# Patient Record
Sex: FEMALE | Race: ASIAN | Employment: OTHER | URBAN - METROPOLITAN AREA
[De-identification: names, ages, dates, MRNs, and addresses within clinical notes are randomized per-mention and may not be internally consistent; named-entity substitution may affect disease eponyms.]

---

## 2018-06-17 ENCOUNTER — APPOINTMENT (OUTPATIENT)
Dept: GENERAL RADIOLOGY | Age: 83
End: 2018-06-17
Attending: EMERGENCY MEDICINE
Payer: MEDICARE

## 2018-06-17 ENCOUNTER — HOSPITAL ENCOUNTER (EMERGENCY)
Age: 83
Discharge: HOME | End: 2018-06-17
Attending: EMERGENCY MEDICINE
Payer: MEDICARE

## 2018-06-17 VITALS
SYSTOLIC BLOOD PRESSURE: 182 MMHG | RESPIRATION RATE: 20 BRPM | HEART RATE: 74 BPM | TEMPERATURE: 97.4 F | DIASTOLIC BLOOD PRESSURE: 65 MMHG | BODY MASS INDEX: 23.99 KG/M2 | OXYGEN SATURATION: 98 % | HEIGHT: 59 IN | WEIGHT: 119 LBS

## 2018-06-17 DIAGNOSIS — S40.021A CONTUSION OF RIGHT ARM, INITIAL ENCOUNTER: Primary | ICD-10-CM

## 2018-06-17 PROCEDURE — A4565 SLINGS: HCPCS

## 2018-06-17 PROCEDURE — 71045 X-RAY EXAM CHEST 1 VIEW: CPT

## 2018-06-17 PROCEDURE — 73060 X-RAY EXAM OF HUMERUS: CPT

## 2018-06-17 PROCEDURE — 74011250637 HC RX REV CODE- 250/637: Performed by: EMERGENCY MEDICINE

## 2018-06-17 PROCEDURE — 99284 EMERGENCY DEPT VISIT MOD MDM: CPT

## 2018-06-17 RX ORDER — METHOCARBAMOL 500 MG/1
500 TABLET, FILM COATED ORAL 3 TIMES DAILY
Qty: 21 TAB | Refills: 0 | Status: SHIPPED | OUTPATIENT
Start: 2018-06-17 | End: 2018-11-20

## 2018-06-17 RX ORDER — OXYCODONE AND ACETAMINOPHEN 5; 325 MG/1; MG/1
1 TABLET ORAL
Status: COMPLETED | OUTPATIENT
Start: 2018-06-17 | End: 2018-06-17

## 2018-06-17 RX ORDER — DICLOFENAC SODIUM 50 MG/1
50 TABLET, DELAYED RELEASE ORAL
Qty: 30 TAB | Refills: 0 | Status: SHIPPED | OUTPATIENT
Start: 2018-06-17 | End: 2018-11-20

## 2018-06-17 RX ORDER — LANOLIN ALCOHOL/MO/W.PET/CERES
CREAM (GRAM) TOPICAL
COMMUNITY

## 2018-06-17 RX ORDER — ACETAMINOPHEN 325 MG/1
TABLET ORAL
COMMUNITY
End: 2021-09-09

## 2018-06-17 RX ORDER — ONDANSETRON 4 MG/1
4 TABLET, ORALLY DISINTEGRATING ORAL
Status: COMPLETED | OUTPATIENT
Start: 2018-06-17 | End: 2018-06-17

## 2018-06-17 RX ADMIN — ONDANSETRON 4 MG: 4 TABLET, ORALLY DISINTEGRATING ORAL at 02:07

## 2018-06-17 RX ADMIN — OXYCODONE HYDROCHLORIDE AND ACETAMINOPHEN 1 TABLET: 5; 325 TABLET ORAL at 00:49

## 2018-06-17 NOTE — DISCHARGE INSTRUCTIONS
Contusion: Care Instructions  Your Care Instructions    Contusion is the medical term for a bruise. It is the result of a direct blow or an impact, such as a fall. Contusions are common sports injuries. Most people think of a bruise as a black-and-blue spot. This happens when small blood vessels get torn and leak blood under the skin. But bones, muscles, and organs can also get bruised. This may damage deep tissues but not cause a bruise you can see. The doctor will do a physical exam to find the location of your contusion. You may also have tests to make sure you do not have a more serious injury, such as a broken bone or nerve damage. These may include X-rays or other imaging tests like a CT scan or MRI. Deep-tissue contusions may cause pain and swelling. But if there is no serious damage, they will often get better in a few weeks with home treatment. The doctor has checked you carefully, but problems can develop later. If you notice any problems or new symptoms, get medical treatment right away. Follow-up care is a key part of your treatment and safety. Be sure to make and go to all appointments, and call your doctor if you are having problems. It's also a good idea to know your test results and keep a list of the medicines you take. How can you care for yourself at home? · Put ice or a cold pack on the sore area for 10 to 20 minutes at a time to stop swelling. Put a thin cloth between the ice pack and your skin. · Be safe with medicines. Read and follow all instructions on the label. ¨ If the doctor gave you a prescription medicine for pain, take it as prescribed. ¨ If you are not taking a prescription pain medicine, ask your doctor if you can take an over-the-counter medicine. · If you can, prop up the sore area on pillows as much as possible for the next few days. Try to keep the sore area above the level of your heart. When should you call for help?   Call your doctor now or seek immediate medical care if:  ? · Your pain gets worse. ? · You have new or worse swelling. ? · You have tingling, weakness, or numbness in the area near the contusion. ? · The area near the contusion is cold or pale. ? Watch closely for changes in your health, and be sure to contact your doctor if:  ? · You do not get better as expected. Where can you learn more? Go to http://kyle-jarett.info/. Enter L837 in the search box to learn more about \"Contusion: Care Instructions. \"  Current as of: March 20, 2017  Content Version: 11.4  © 9759-3801 [employer]. Care instructions adapted under license by [employer] (which disclaims liability or warranty for this information). If you have questions about a medical condition or this instruction, always ask your healthcare professional. [address] any warranty or liability for your use of this information.

## 2018-06-17 NOTE — ED PROVIDER NOTES
EMERGENCY DEPARTMENT HISTORY AND PHYSICAL EXAM    Date: 6/17/2018  Patient Name: Antionette Heck    History of Presenting Illness     Chief Complaint   Patient presents with   Kaleigh Bob Fall    Arm Pain         History Provided By: Patient and Patient's     Chief Complaint: Injuries s/p mechanical fall  Duration:  PTA  Timing:  Acute  Modifying Factors: No relieving or worsening factors  Associated Symptoms: Right upper arm pain    Additional History (Context):   12:26 AM  Luis Lawrence is a 80 y.o. female  who presents to the emergency department C/O injuries s/p mechanical fall PTA. Associated sxs include right upper arm pain. Pt is allergic to aspirin and penicillin. Pt denies elbow pain, tobacco use, etoh use, and any other sxs or complaints. PCP: Chaim Reynolds MD    Current Outpatient Prescriptions   Medication Sig Dispense Refill    ferrous sulfate (IRON) 325 mg (65 mg iron) tablet Take  by mouth Daily (before breakfast).  acetaminophen (TYLENOL) 325 mg tablet Take  by mouth every four (4) hours as needed for Pain.  albuterol (PROVENTIL HFA, VENTOLIN HFA, PROAIR HFA) 90 mcg/actuation inhaler Take 2 Puffs by inhalation every four (4) hours as needed for Wheezing. 1 Inhaler 1    lidocaine (XYLOCAINE) 5 % ointment Apply  to affected area as needed for Pain.  diclofenac (FLECTOR) 1.3 % pt12 transdermal patch 1 Patch by TransDERmal route two (2) times a day.  camphor-menthol (SARNA ANTI-ITCH) 0.5-0.5 % lotion Apply  to affected area as needed for Itching.  hydrocortisone (ANUSOL-HC) 2.5 % rectal cream Insert  into rectum four (4) times daily.  Hydrochlorothiazide (HYDRODIURIL) 12.5 mg tablet Take 12.5 mg by mouth daily.  sitaGLIPtin (JANUVIA) 25 mg tablet Take 25 mg by mouth daily.  levothyroxine (SYNTHROID) 25 mcg tablet Take  by mouth Daily (before breakfast).  telmisartan (MICARDIS) 40 mg tablet Take 1 Tab by mouth daily.  90 Tab 3    Calcium Carbonate-Vit D3-Min (CALCIUM-VITAMIN D) 600-400 mg-unit Tab Take 1 Tab by mouth two (2) times a day.  aspirin delayed-release 81 mg tablet Take 81 mg by mouth daily.  pyridoxine (VITAMIN B-6) 50 mg tablet Take 50 mg by mouth daily.  pravastatin (PRAVACHOL) 40 mg tablet Take 40 mg by mouth nightly.  esomeprazole (NEXIUM) 40 mg capsule Take 40 mg by mouth two (2) times a day. Past History     Past Medical History:  Past Medical History:   Diagnosis Date    Anemia     Arthritis     osteoarthritis    CAD (coronary artery disease)     Depression     Diabetes ([address]     adult onset    GERD (gastroesophageal reflux disease)     [address]     Hypercholesterolemia     Hyperlipidemia     Hypertension     Menopause     Mitral regurgitation     Myocardial infarct (HCC)     Sick sinus syndrome (HCC)        Past Surgical History:  Past Surgical History:   Procedure Laterality Date    HX HEART CATHETERIZATION      HX OTHER SURGICAL      shoulder surgery    HX OTHER SURGICAL      eye surgery    HX OTHER SURGICAL      sinus surgery    HX OTHER SURGICAL      knee surgery       Family History:  Family History   Problem Relation Age of Onset    Coronary Artery Disease Mother     Anemia Neg Hx     Arrhythmia Neg Hx     Asthma Neg Hx     Clotting Disorder Neg Hx     Diabetes Neg Hx     Fainting Neg Hx     Heart Attack Neg Hx     Heart Surgery Neg Hx     High Cholesterol Neg Hx     Hypertension Neg Hx     Pacemaker Neg Hx     Sudden Death Neg Hx        Social History:  Social History   Substance Use Topics    Smoking status: Never Smoker    Smokeless tobacco: None    Alcohol use No       Allergies:   Allergies   Allergen Reactions    Latex, Natural Rubber Not Reported This Time    Aspirin Nausea Only    Iodinated Contrast- Oral And Iv Dye Other (comments)     Reactions: splotches on skin    Other Medication Itching     All \"mycins\"    Pcn [Penicillins] Other (comments)     Reactions: splotches on skin    Sting, Bee Swelling         Review of Systems   Review of Systems   Constitutional: Negative for fever. Musculoskeletal: Positive for myalgias (right upper arm pain). Negative for arthralgias (elbow pain). All other systems reviewed and are negative. Physical Exam     Vitals:    06/17/18 0018   BP: 182/65   Pulse: 74   Resp: 20   Temp: 97.4 °F (36.3 °C)   SpO2: 98%   Weight: 54 kg (119 lb)   Height: 4' 11\" (1.499 m)     Physical Exam   Constitutional: She is oriented to person, place, and time. She appears well-developed and well-nourished. No distress. HENT:   Head: Normocephalic and atraumatic. Right Ear: External ear normal.   Left Ear: External ear normal.   Mouth/Throat: Oropharynx is clear and moist. No oropharyngeal exudate. Eyes: Conjunctivae and EOM are normal. Pupils are equal, round, and reactive to light. No scleral icterus. No pallor   Neck: Normal range of motion. Neck supple. No JVD present. No tracheal deviation present. No thyromegaly present. Cardiovascular: Normal rate, regular rhythm and normal heart sounds. Pulmonary/Chest: Effort normal and breath sounds normal. No stridor. No respiratory distress. Abdominal: Soft. Bowel sounds are normal. She exhibits no distension. There is no tenderness. There is no rebound and no guarding. Musculoskeletal: Normal range of motion. She exhibits no edema or tenderness. No soft tissue injuries. Proximal humerus tenderness.  strength is normal.    No crepitus. Radial, median, and ulnar nerves are all normal.   Lymphadenopathy:     She has no cervical adenopathy. Neurological: She is alert and oriented to person, place, and time. She has normal reflexes. No cranial nerve deficit. Coordination normal.   Skin: Skin is warm and dry. No rash noted. She is not diaphoretic. No erythema. Psychiatric: She has a normal mood and affect.  Her behavior is normal. Judgment and thought content normal.   Nursing note and vitals reviewed. Diagnostic Study Results     Labs -   No results found for this or any previous visit (from the past 12 hour(s)). Radiologic Studies -     RADIOLOGY FINDINGS  Right humerus X-ray shows no acute process  Pending review by Radiologist  Recorded by REMBERTO Yang, as dictated by Jesus. Jorge Mark MD      RADIOLOGY FINDINGS  Chest X-ray shows no fracture or pulmonary findings. No PTX or pleural effusion. Pending review by Radiologist  Recorded by REMBERTO Yang, as dictated by Jesus. Jorge Mark MD    XR HUMERUS RT    (Results Pending)   XR CHEST SNGL V    (Results Pending)     CT Results  (Last 48 hours)    None        CXR Results  (Last 48 hours)    None          Medications given in the ED-  Medications   oxyCODONE-acetaminophen (PERCOCET) 5-325 mg per tablet 1 Tab (1 Tab Oral Given 6/17/18 0049)   ondansetron (ZOFRAN ODT) tablet 4 mg (4 mg Oral Given 6/17/18 0207)         Medical Decision Making   I am the first provider for this patient. I reviewed the vital signs, available nursing notes, past medical history, past surgical history, family history and social history. Vital Signs-Reviewed the patient's vital signs. Pulse Oximetry Analysis - 98% on room air       Records Reviewed: Nursing Notes and Old Medical Records    Provider Notes (Medical Decision Making):   Fracture of right humerus is probable. Dislocation also possible but unlikely she shows no signs of radial nerve palsy other neurologic or vascular injury. Procedures:  Procedures    ED Course:   12:26 AM Initial assessment performed. The patients presenting problems have been discussed, and they are in agreement with the care plan formulated and outlined with them. I have encouraged them to ask questions as they arise throughout their visit. Diagnosis and Disposition       DISCHARGE NOTE:  2:18 AM  Luis LAKHWINDER Irma's  results have been reviewed with her.   She has been counseled regarding her diagnosis, treatment, and plan. She verbally conveys understanding and agreement of the signs, symptoms, diagnosis, treatment and prognosis and additionally agrees to follow up as discussed. She also agrees with the care-plan and conveys that all of her questions have been answered. I have also provided discharge instructions for her that include: educational information regarding their diagnosis and treatment, and list of reasons why they would want to return to the ED prior to their follow-up appointment, should her condition change. She has been provided with education for proper emergency department utilization. CLINICAL IMPRESSION:    1. Contusion of right arm, initial encounter        PLAN:  1. D/C Home  2. Current Discharge Medication List        3. Follow-up Information     Follow up With Details Comments [id number] [facility] MD Mattie Schedule an appointment as soon as possible for a visit in 2 days For orthopedic follow up [address] [place], [place] [address] [facility]      Favio Cage MD Schedule an appointment as soon as possible for a visit in 2 days For primary care follow up [place] [phone] [facility]      THE [facility] EMERGENCY DEPT Go to As needed, if symptoms worsen [address] Code [id number]  [phone]        _______________________________    Attestations: This note is prepared by Wendy Hurtado, acting as Scribe for Jesus. Annette Merino MD.    Jesus. Annette Merino MD:  The scribe's documentation has been prepared under my direction and personally reviewed by me in its entirety.   I confirm that the note above accurately reflects all work, treatment, procedures, and medical decision making performed by me.  _______________________________

## 2018-06-17 NOTE — ED NOTES
Encouraged pt to lay in bed.  at bedside. Call bell within reach, side rails x 1, stretcher in lowest position.

## 2018-06-17 NOTE — ED NOTES
Discharge instructions reviewed with opportunity for questions provided. Pt vocalized understanding. Armband removed and shredded. Pt stable condition at time of discharge. Ride at bedside.

## 2018-06-17 NOTE — ED NOTES
Pt hourly rounding competed. Safety   Pt () resting on stretcher with side rails up and call bell in reach. (x) in chair    () in parents arms. Toileting   Pt offered ()Bedpan     ()Assistance to Restroom     ()Urinal  Ongoing Updates  Updated on plan of care and status of test results. Pain Management  Inquired as to comfort and offered comfort measures:    (x) warm blankets   () dimmed lights    Pt reporting nausea. Will update provider.

## 2018-08-27 ENCOUNTER — APPOINTMENT (OUTPATIENT)
Dept: PHYSICAL THERAPY | Age: 83
End: 2018-08-27

## 2018-09-13 ENCOUNTER — HOSPITAL ENCOUNTER (OUTPATIENT)
Dept: MAMMOGRAPHY | Age: 83
Discharge: HOME | End: 2018-09-13
Attending: INTERNAL MEDICINE
Payer: MEDICARE

## 2018-09-13 DIAGNOSIS — M85.80 OSTEOPENIA: ICD-10-CM

## 2018-09-13 DIAGNOSIS — Z78.0 POST-MENOPAUSAL: ICD-10-CM

## 2018-09-13 PROCEDURE — 77080 DXA BONE DENSITY AXIAL: CPT

## 2018-11-20 ENCOUNTER — HOSPITAL ENCOUNTER (OUTPATIENT)
Dept: PREADMISSION TESTING | Age: 83
Discharge: HOME | End: 2018-11-20
Payer: MEDICARE

## 2018-11-20 LAB
ALBUMIN SERPL-MCNC: 3.4 G/DL (ref 3.4–5)
ALBUMIN/GLOB SERPL: 1 {RATIO} (ref 0.8–1.7)
ALP SERPL-CCNC: 85 U/L (ref 45–117)
ALT SERPL-CCNC: 22 U/L (ref 13–56)
ANION GAP SERPL CALC-SCNC: 15 MMOL/L (ref 3–18)
APTT PPP: 33.1 SEC (ref 23–36.4)
AST SERPL-CCNC: 18 U/L (ref 15–37)
BACTERIA SPEC CULT: NORMAL
BILIRUB SERPL-MCNC: 0.5 MG/DL (ref 0.2–1)
BUN SERPL-MCNC: 44 MG/DL (ref 7–18)
BUN/CREAT SERPL: 32
CALCIUM SERPL-MCNC: 9.3 MG/DL (ref 8.5–10.1)
CHLORIDE SERPL-SCNC: 104 MMOL/L (ref 100–108)
CO2 SERPL-SCNC: 21 MMOL/L (ref 21–32)
CREAT SERPL-MCNC: 1.36 MG/DL (ref 0.6–1.3)
ERYTHROCYTE [DISTWIDTH] IN BLOOD BY AUTOMATED COUNT: 13.4 % (ref 11.6–14.5)
GLOBULIN SER CALC-MCNC: 3.5 G/DL (ref 2–4)
GLUCOSE SERPL-MCNC: 109 MG/DL (ref 74–99)
HCT VFR BLD AUTO: 38.5 % (ref 35–45)
HGB BLD-MCNC: 12.9 G/DL (ref 12–16)
INR PPP: 1.1 (ref 0.8–1.2)
MCH RBC QN AUTO: 29.8 PG (ref 24–34)
MCHC RBC AUTO-ENTMCNC: 33.5 G/DL (ref 31–37)
MCV RBC AUTO: 88.9 FL (ref 74–97)
PLATELET # BLD AUTO: 267 K/UL (ref 135–420)
PMV BLD AUTO: 9.5 FL (ref 9.2–11.8)
POTASSIUM SERPL-SCNC: 4.6 MMOL/L (ref 3.5–5.5)
PROT SERPL-MCNC: 6.9 G/DL (ref 6.4–8.2)
PROTHROMBIN TIME: 13.4 SEC (ref 11.5–15.2)
RBC # BLD AUTO: 4.33 M/UL (ref 4.2–5.3)
SERVICE CMNT-IMP: NORMAL
SODIUM SERPL-SCNC: 140 MMOL/L (ref 136–145)
WBC # BLD AUTO: 8.4 K/UL (ref 4.6–13.2)

## 2018-11-20 PROCEDURE — 93005 ELECTROCARDIOGRAM TRACING: CPT

## 2018-11-20 PROCEDURE — 85730 THROMBOPLASTIN TIME PARTIAL: CPT

## 2018-11-20 PROCEDURE — 87641 MR-STAPH DNA AMP PROBE: CPT

## 2018-11-20 PROCEDURE — 85027 COMPLETE CBC AUTOMATED: CPT

## 2018-11-20 PROCEDURE — 85610 PROTHROMBIN TIME: CPT

## 2018-11-20 PROCEDURE — 80053 COMPREHEN METABOLIC PANEL: CPT

## 2018-11-20 RX ORDER — LANOLIN ALCOHOL/MO/W.PET/CERES
50 CREAM (GRAM) TOPICAL DAILY
Status: ON HOLD | COMMUNITY
End: 2019-01-11

## 2018-11-20 RX ORDER — TELMISARTAN AND HYDROCHLORTHIAZIDE 40; 12.5 MG/1; MG/1
1 TABLET ORAL DAILY
COMMUNITY

## 2018-11-20 RX ORDER — NITROFURANTOIN MACROCRYSTALS 50 MG/1
50 CAPSULE ORAL EVERY 6 HOURS
COMMUNITY
End: 2021-09-09

## 2018-11-20 RX ORDER — SODIUM CHLORIDE, SODIUM LACTATE, POTASSIUM CHLORIDE, CALCIUM CHLORIDE 600; 310; 30; 20 MG/100ML; MG/100ML; MG/100ML; MG/100ML
125 INJECTION, SOLUTION INTRAVENOUS CONTINUOUS
Status: CANCELLED | OUTPATIENT
Start: 2018-11-20

## 2018-11-20 NOTE — PERIOP NOTES
Patient has red, itchy raised bumps on lower abdomen, have a small yellowish head on them. She states they came after her steroid injection. Checked back and all over trunk, bumps only in lower abdomen but patient states she feels itchy in the sides of her abdomen too. Dr. Zeina Sagastume office notified.

## 2018-11-20 NOTE — PERIOP NOTES
No sleep apnea or previous sleep studies. No history of MH. PCP is aware of surgery. Care fusion instructions reviewed and wipes given. Does  meet criteria for special population at this time. Not participating in clinical trials or research study. Patient and  state Dr Oumou Blanco office has contacted Cardio and PCP to for pre-op clearance, to be faxed to PAT.

## 2018-11-22 LAB
ATRIAL RATE: 82 BPM
CALCULATED R AXIS, ECG10: -145 DEGREES
CALCULATED T AXIS, ECG11: 118 DEGREES
DIAGNOSIS, 93000: NORMAL
P-R INTERVAL, ECG05: 158 MS
Q-T INTERVAL, ECG07: 396 MS
QRS DURATION, ECG06: 80 MS
QTC CALCULATION (BEZET), ECG08: 462 MS
VENTRICULAR RATE, ECG03: 82 BPM

## 2018-12-20 ENCOUNTER — HOSPITAL ENCOUNTER (OUTPATIENT)
Dept: NUCLEAR MEDICINE | Age: 83
Discharge: HOME | End: 2018-12-20
Attending: INTERNAL MEDICINE
Payer: MEDICARE

## 2018-12-20 ENCOUNTER — HOSPITAL ENCOUNTER (OUTPATIENT)
Dept: NON INVASIVE DIAGNOSTICS | Age: 83
Discharge: HOME | End: 2018-12-20
Attending: INTERNAL MEDICINE
Payer: MEDICARE

## 2018-12-20 VITALS
DIASTOLIC BLOOD PRESSURE: 85 MMHG | HEIGHT: 59 IN | SYSTOLIC BLOOD PRESSURE: 160 MMHG | WEIGHT: 119 LBS | BODY MASS INDEX: 23.99 KG/M2

## 2018-12-20 DIAGNOSIS — R06.02 SHORTNESS OF BREATH: ICD-10-CM

## 2018-12-20 PROCEDURE — 93017 CV STRESS TEST TRACING ONLY: CPT

## 2018-12-20 PROCEDURE — A9500 TC99M SESTAMIBI: HCPCS

## 2018-12-20 PROCEDURE — 74011250636 HC RX REV CODE- 250/636

## 2018-12-20 RX ADMIN — REGADENOSON 0.4 MG: 0.08 INJECTION, SOLUTION INTRAVENOUS at 14:57

## 2018-12-21 LAB
STRESS BASELINE DIAS BP: 85 MMHG
STRESS BASELINE HR: 75 BPM
STRESS BASELINE SYS BP: 160 MMHG
STRESS ESTIMATED WORKLOAD: 1 METS
STRESS EXERCISE DUR MIN: NORMAL
STRESS PEAK DIAS BP: 94 MMHG
STRESS PEAK SYS BP: 173 MMHG
STRESS PERCENT HR ACHIEVED: 80 %
STRESS POST PEAK HR: 91 BPM
STRESS RATE PRESSURE PRODUCT: NORMAL BPM*MMHG
STRESS ST DEPRESSION: 0 MM
STRESS ST ELEVATION: 0 MM
STRESS STAGE 1 DURATION: NORMAL MIN:SEC
STRESS STAGE 1 HR: 89 BPM
STRESS STAGE RECOVERY 1 BP: NORMAL MMHG
STRESS STAGE RECOVERY 1 DURATION: NORMAL MIN:SEC
STRESS STAGE RECOVERY 1 HR: 84 BPM
STRESS TARGET HR: 114 BPM

## 2019-01-02 ENCOUNTER — HOSPITAL ENCOUNTER (OUTPATIENT)
Dept: NON INVASIVE DIAGNOSTICS | Age: 84
Discharge: HOME | End: 2019-01-02
Payer: MEDICARE

## 2019-01-02 ENCOUNTER — HOSPITAL ENCOUNTER (OUTPATIENT)
Dept: PREADMISSION TESTING | Age: 84
Discharge: HOME | End: 2019-01-02
Payer: MEDICARE

## 2019-01-02 DIAGNOSIS — M86.061: ICD-10-CM

## 2019-01-02 DIAGNOSIS — M48.07 LUMBOSACRAL STENOSIS: ICD-10-CM

## 2019-01-02 DIAGNOSIS — M47.26 OTHER SPONDYLOSIS WITH RADICULOPATHY, LUMBAR REGION: ICD-10-CM

## 2019-01-02 LAB
ALBUMIN SERPL-MCNC: 3.8 G/DL (ref 3.4–5)
ALBUMIN/GLOB SERPL: 1.1 {RATIO} (ref 0.8–1.7)
ALP SERPL-CCNC: 74 U/L (ref 45–117)
ALT SERPL-CCNC: 20 U/L (ref 13–56)
ANION GAP SERPL CALC-SCNC: 6 MMOL/L (ref 3–18)
APTT PPP: 33.2 SEC (ref 23–36.4)
AST SERPL-CCNC: 20 U/L (ref 15–37)
ATRIAL RATE: 78 BPM
BACTERIA SPEC CULT: NORMAL
BILIRUB SERPL-MCNC: 0.4 MG/DL (ref 0.2–1)
BUN SERPL-MCNC: 40 MG/DL (ref 7–18)
BUN/CREAT SERPL: 28
CALCIUM SERPL-MCNC: 9.2 MG/DL (ref 8.5–10.1)
CALCULATED P AXIS, ECG09: 61 DEGREES
CALCULATED R AXIS, ECG10: -47 DEGREES
CALCULATED T AXIS, ECG11: 61 DEGREES
CHLORIDE SERPL-SCNC: 105 MMOL/L (ref 100–108)
CO2 SERPL-SCNC: 28 MMOL/L (ref 21–32)
CREAT SERPL-MCNC: 1.44 MG/DL (ref 0.6–1.3)
DIAGNOSIS, 93000: NORMAL
ERYTHROCYTE [DISTWIDTH] IN BLOOD BY AUTOMATED COUNT: 14.1 % (ref 11.6–14.5)
GLOBULIN SER CALC-MCNC: 3.5 G/DL (ref 2–4)
GLUCOSE SERPL-MCNC: 89 MG/DL (ref 74–99)
HCT VFR BLD AUTO: 42.2 % (ref 35–45)
HGB BLD-MCNC: 14 G/DL (ref 12–16)
INR PPP: 0.9 (ref 0.8–1.2)
MCH RBC QN AUTO: 29.5 PG (ref 24–34)
MCHC RBC AUTO-ENTMCNC: 33.2 G/DL (ref 31–37)
MCV RBC AUTO: 88.8 FL (ref 74–97)
P-R INTERVAL, ECG05: 154 MS
PLATELET # BLD AUTO: 240 K/UL (ref 135–420)
PMV BLD AUTO: 10.2 FL (ref 9.2–11.8)
POTASSIUM SERPL-SCNC: 4.5 MMOL/L (ref 3.5–5.5)
PROT SERPL-MCNC: 7.3 G/DL (ref 6.4–8.2)
PROTHROMBIN TIME: 12.3 SEC (ref 11.5–15.2)
Q-T INTERVAL, ECG07: 406 MS
QRS DURATION, ECG06: 84 MS
QTC CALCULATION (BEZET), ECG08: 462 MS
RBC # BLD AUTO: 4.75 M/UL (ref 4.2–5.3)
SERVICE CMNT-IMP: NORMAL
SODIUM SERPL-SCNC: 139 MMOL/L (ref 136–145)
VENTRICULAR RATE, ECG03: 78 BPM
WBC # BLD AUTO: 8 K/UL (ref 4.6–13.2)

## 2019-01-02 PROCEDURE — 85730 THROMBOPLASTIN TIME PARTIAL: CPT

## 2019-01-02 PROCEDURE — 85610 PROTHROMBIN TIME: CPT

## 2019-01-02 PROCEDURE — 93005 ELECTROCARDIOGRAM TRACING: CPT

## 2019-01-02 PROCEDURE — 36415 COLL VENOUS BLD VENIPUNCTURE: CPT

## 2019-01-02 PROCEDURE — 87641 MR-STAPH DNA AMP PROBE: CPT

## 2019-01-02 PROCEDURE — 85027 COMPLETE CBC AUTOMATED: CPT

## 2019-01-02 PROCEDURE — 80053 COMPREHEN METABOLIC PANEL: CPT

## 2019-01-10 RX ORDER — ACETAMINOPHEN AND CODEINE PHOSPHATE 300; 30 MG/1; MG/1
1 TABLET ORAL
Status: ON HOLD | COMMUNITY
End: 2019-01-22

## 2019-01-10 RX ORDER — LANOLIN ALCOHOL/MO/W.PET/CERES
1000 CREAM (GRAM) TOPICAL DAILY
COMMUNITY

## 2019-01-10 RX ORDER — MONTELUKAST SODIUM 10 MG/1
10 TABLET ORAL EVERY EVENING
COMMUNITY

## 2019-01-10 RX ORDER — ALBUTEROL SULFATE 90 UG/1
2 AEROSOL, METERED RESPIRATORY (INHALATION)
COMMUNITY

## 2019-01-10 RX ORDER — ONDANSETRON 4 MG/1
4 TABLET, FILM COATED ORAL
COMMUNITY
End: 2021-09-09

## 2019-01-10 RX ORDER — BENZONATATE 100 MG/1
100 CAPSULE ORAL
COMMUNITY

## 2019-01-10 RX ORDER — HYDROCORTISONE 25 MG/G
CREAM TOPICAL 2 TIMES DAILY
COMMUNITY

## 2019-01-10 RX ORDER — CODEINE PHOSPHATE AND GUAIFENESIN 10; 100 MG/5ML; MG/5ML
5 SOLUTION ORAL EVERY 4 HOURS
COMMUNITY
End: 2019-01-24 | Stop reason: ALTCHOICE

## 2019-01-11 ENCOUNTER — HOSPITAL ENCOUNTER (OUTPATIENT)
Age: 84
Setting detail: OUTPATIENT SURGERY
Discharge: HOME | End: 2019-01-11
Attending: INTERNAL MEDICINE | Admitting: INTERNAL MEDICINE
Payer: MEDICARE

## 2019-01-11 VITALS
BODY MASS INDEX: 25.64 KG/M2 | DIASTOLIC BLOOD PRESSURE: 54 MMHG | OXYGEN SATURATION: 96 % | TEMPERATURE: 98 F | SYSTOLIC BLOOD PRESSURE: 152 MMHG | WEIGHT: 114 LBS | HEART RATE: 56 BPM | HEIGHT: 56 IN | RESPIRATION RATE: 20 BRPM

## 2019-01-11 DIAGNOSIS — R06.02 SOB (SHORTNESS OF BREATH): ICD-10-CM

## 2019-01-11 LAB
GLUCOSE BLD STRIP.AUTO-MCNC: 142 MG/DL (ref 70–110)
GLUCOSE BLD STRIP.AUTO-MCNC: <30 MG/DL (ref 70–110)

## 2019-01-11 PROCEDURE — C1769 GUIDE WIRE: HCPCS | Performed by: INTERNAL MEDICINE

## 2019-01-11 PROCEDURE — 77030008543 HC TBNG MON PRSS MRTM -A: Performed by: INTERNAL MEDICINE

## 2019-01-11 PROCEDURE — 74011636320 HC RX REV CODE- 636/320: Performed by: INTERNAL MEDICINE

## 2019-01-11 PROCEDURE — 99152 MOD SED SAME PHYS/QHP 5/>YRS: CPT | Performed by: INTERNAL MEDICINE

## 2019-01-11 PROCEDURE — 74011250636 HC RX REV CODE- 250/636: Performed by: INTERNAL MEDICINE

## 2019-01-11 PROCEDURE — 77030004521 HC CATH ANGI DX COOK -B: Performed by: INTERNAL MEDICINE

## 2019-01-11 PROCEDURE — 93458 L HRT ARTERY/VENTRICLE ANGIO: CPT | Performed by: INTERNAL MEDICINE

## 2019-01-11 PROCEDURE — 77030004522 HC CATH ANGI DX EXPO BSC -A: Performed by: INTERNAL MEDICINE

## 2019-01-11 PROCEDURE — 74011250636 HC RX REV CODE- 250/636

## 2019-01-11 PROCEDURE — 77030013797 HC KT TRNSDUC PRSSR EDWD -A: Performed by: INTERNAL MEDICINE

## 2019-01-11 PROCEDURE — C1894 INTRO/SHEATH, NON-LASER: HCPCS | Performed by: INTERNAL MEDICINE

## 2019-01-11 PROCEDURE — 77030029997 HC DEV COM RDL R BND TELE -B: Performed by: INTERNAL MEDICINE

## 2019-01-11 PROCEDURE — 82962 GLUCOSE BLOOD TEST: CPT

## 2019-01-11 PROCEDURE — 77030016699 HC CATH ANGI DX INFN1 CARD -A: Performed by: INTERNAL MEDICINE

## 2019-01-11 PROCEDURE — 99153 MOD SED SAME PHYS/QHP EA: CPT | Performed by: INTERNAL MEDICINE

## 2019-01-11 PROCEDURE — 74011000250 HC RX REV CODE- 250: Performed by: INTERNAL MEDICINE

## 2019-01-11 RX ORDER — HYDROCORTISONE SODIUM SUCCINATE 100 MG/2ML
INJECTION, POWDER, FOR SOLUTION INTRAMUSCULAR; INTRAVENOUS AS NEEDED
Status: DISCONTINUED | OUTPATIENT
Start: 2019-01-11 | End: 2019-01-11 | Stop reason: HOSPADM

## 2019-01-11 RX ORDER — FENTANYL CITRATE 50 UG/ML
INJECTION, SOLUTION INTRAMUSCULAR; INTRAVENOUS AS NEEDED
Status: DISCONTINUED | OUTPATIENT
Start: 2019-01-11 | End: 2019-01-11 | Stop reason: HOSPADM

## 2019-01-11 RX ORDER — HEPARIN SODIUM 200 [USP'U]/100ML
INJECTION, SOLUTION INTRAVENOUS
Status: COMPLETED | OUTPATIENT
Start: 2019-01-11 | End: 2019-01-11

## 2019-01-11 RX ORDER — HEPARIN SODIUM 1000 [USP'U]/ML
INJECTION, SOLUTION INTRAVENOUS; SUBCUTANEOUS AS NEEDED
Status: DISCONTINUED | OUTPATIENT
Start: 2019-01-11 | End: 2019-01-11 | Stop reason: HOSPADM

## 2019-01-11 RX ORDER — SODIUM CHLORIDE 9 MG/ML
50 INJECTION, SOLUTION INTRAVENOUS CONTINUOUS
Status: DISCONTINUED | OUTPATIENT
Start: 2019-01-11 | End: 2019-01-11 | Stop reason: HOSPADM

## 2019-01-11 RX ORDER — VERAPAMIL HYDROCHLORIDE 2.5 MG/ML
INJECTION, SOLUTION INTRAVENOUS AS NEEDED
Status: DISCONTINUED | OUTPATIENT
Start: 2019-01-11 | End: 2019-01-11 | Stop reason: HOSPADM

## 2019-01-11 RX ORDER — SODIUM CHLORIDE 0.9 % (FLUSH) 0.9 %
5-40 SYRINGE (ML) INJECTION EVERY 8 HOURS
Status: DISCONTINUED | OUTPATIENT
Start: 2019-01-11 | End: 2019-01-11 | Stop reason: HOSPADM

## 2019-01-11 RX ORDER — SODIUM CHLORIDE 0.9 % (FLUSH) 0.9 %
5-40 SYRINGE (ML) INJECTION AS NEEDED
Status: DISCONTINUED | OUTPATIENT
Start: 2019-01-11 | End: 2019-01-11 | Stop reason: HOSPADM

## 2019-01-11 RX ORDER — ONDANSETRON 2 MG/ML
4 INJECTION INTRAMUSCULAR; INTRAVENOUS
Status: DISCONTINUED | OUTPATIENT
Start: 2019-01-11 | End: 2019-01-11 | Stop reason: HOSPADM

## 2019-01-11 RX ORDER — MIDAZOLAM HYDROCHLORIDE 1 MG/ML
INJECTION, SOLUTION INTRAMUSCULAR; INTRAVENOUS AS NEEDED
Status: DISCONTINUED | OUTPATIENT
Start: 2019-01-11 | End: 2019-01-11 | Stop reason: HOSPADM

## 2019-01-11 RX ORDER — ONDANSETRON 2 MG/ML
INJECTION INTRAMUSCULAR; INTRAVENOUS
Status: DISCONTINUED
Start: 2019-01-11 | End: 2019-01-11 | Stop reason: HOSPADM

## 2019-01-11 RX ADMIN — ONDANSETRON 4 MG: 2 INJECTION INTRAMUSCULAR; INTRAVENOUS at 11:14

## 2019-01-11 RX ADMIN — SODIUM CHLORIDE 50 ML/HR: 900 INJECTION, SOLUTION INTRAVENOUS at 09:28

## 2019-01-11 NOTE — Clinical Note
TRANSFER - OUT REPORT:  
 
Verbal report given to: chloe. Report consisted of patient's Situation, Background, Assessment and  
Recommendations(SBAR). Opportunity for questions and clarification was provided. Patient transported with a Registered Nurse and [address] [place] [address] / [facility] [employer].

## 2019-01-11 NOTE — Clinical Note
TRANSFER - IN REPORT:  
 
Verbal report received from: rosendo. Report consisted of patient's Situation, Background, Assessment and  
Recommendations(SBAR). Opportunity for questions and clarification was provided. Assessment completed upon patient's arrival to unit and care assumed. Patient transported with a Registered Nurse and [address] [place] [address] / [facility] [employer].

## 2019-01-11 NOTE — PROGRESS NOTES
Pt is all prepped an ready for procedure. Pt and spouse stated of being allergic to red dye not contrast dye. Asked many times and confirmed of no allergy to contrast dye only red dye.

## 2019-01-11 NOTE — PROCEDURES
LHC and Coronary angiogram done via left radial approach. Coronary anatomy described below with noted coronary artery disease. LV gram was not done. LVEDP 6-10 mm hg. No significant gradient on pull back. Left main has luminal irregularities. Left main bifurcates in LAD and LCx. LAD has luminal irregularities. D1 is large caliber long vessel and has luminal irregularities. LAD/D has corkscrew appearance. LCx is non dominant vessel. LCx has luminal irregularities. OM1 is small caliber vessel with luminal irregularities. LCx/OM has corkscrew appearance. RCA is dominant vessel. RCA has luminal irregularities. RPDA and RPLA are medium to large caliber vessel with luminal irregularities. Patient tolerated procedure well. Scant blood loss. No complications. No specimen removed. Recommendation:    Medication considered:   Aspirin, beta blocker, ACEI, Nitrates and statin     CAD risk factor education  Diet education  Control cholesterol  Blood pressure control  Exercise education: Age and functional status appropriate. Patient is at moderate risk for ernie procedural cardiovascular event for back surgery.

## 2019-01-11 NOTE — PROGRESS NOTES
1115 Pt back to care unit S/P cardiac cath. Pt awake and alert. TR band to lt wrist with immobilizer in place. Site WNL. Lr wrist precautions reinforced. Pt c/o nausea and Zofran adm as per order 
 
1200 Pt reported much relief from nausea. 1230 TR and removed and site WNL. Tegaderm dressing and sterile 2x2 applied. 1245 Pt request to go to bathroom, assisted. A few minutes later pt started bleeding from lt wrist site. Pressure applied and taken back to bed. Bleeding persists, so another TR band reapplied by [employer]. 1312 Started to take air out slowly from  TR band. .. 1330 Second Tr band removed and tolerated well. No bleeding nor hematoma noted. 1400 Discharge instructions reviewed with pt and spouse and they verbalized all understandings. Pt escorted to car and left with spouse in stable condition Lt wrist dry and intact with immobilizer in place.

## 2019-01-11 NOTE — ROUTINE PROCESS
Cardiac Cath Lab:  Pre Procedure Chart Check Patients chart was accessed and reviewed for possible and/or scheduled procedure. Creatinine Clearance: 
Serum creatinine: 1.44 mg/dL (H) 01/02/19 1454 Estimated creatinine clearance: 20.4 mL/min (A) Total Contrast  Load: 
3 x estimated clearance amount=  61.2ml 
 
75% of Contrast Load: 0.75 x Total Contrast Load=    45.9ml No results for input(s): WBC, RBC, HCT, HGB, PLT, INR, APTT, PTP, NA, K, BUN, CREA, GFRAA, GFRNA, CA, MAG, CPK, CKMB, CKNDX, CKND1, TROPT, TROIQ, BNPP, BNP, HCTEXT, HGBEXT, PLTEXT in the last 72 hours. No lab exists for component: DDIMER, GLUCOSE, INREXT 
 
BMI: There is no height or weight on file to calculate BMI. ALLERGIES:  
Allergies Allergen Reactions  Latex, Natural Rubber Not Reported This Time  Aspirin Nausea Only  Iodinated Contrast- Oral And Iv Dye Other (comments) Reactions: splotches on skin  Other Medication Itching All \"mycins\"  Pcn [Penicillins] Other (comments) Reactions: splotches on skin  Sting, Bee Swelling Lines: 
  
  
  
  
 
History: 
 
Past Medical History:  
Diagnosis Date  Adverse effect of anesthesia   
 very, very sleepy with Anesthesia  Adverse effect of anesthesia   
 decreased heartrate with colonoscopy  Anemia  Arthritis   
 osteoarthritis  CAD (coronary artery disease)  Depression  Diabetes ([facility] [address] adult onset  Difficult intubation   
 went bradycardc with symptoms, had diificuly waking up for 24 hours  Foot fracture 2008  
 right  GERD (gastroesophageal reflux disease)  Heart attack ([facility] [address] 2005  Hypercholesterolemia  Hyperlipidemia  Hypertension  Ill-defined condition 10/2018  
 reddened itchy bumps on lower abdomen after steroid injections in October, itchy patient states, very red, slightly opened with yellow looking head on them.  Ill-defined condition history of frequent uti's, especially with stress and pressure on the stomach  Ill-defined condition   
 history of post nasal drip  Ill-defined condition   
 history of frequent small BM's at night  Ill-defined condition   
 recurren bladder infection  Menopause  Mitral regurgitation  Myocardial infarct ([address]  Nausea & vomiting   
 severe nausea  Sick sinus syndrome ([address]  Thyroid disease Past Surgical History:  
Procedure Laterality Date  HX CATARACT REMOVAL Right  HX GI    
 colonosopy  HX GYN    
 bartholin cyst  
 HX HEART CATHETERIZATION  2005  HX HEENT Bilateral   
 macular puckering  HX HEENT    
 sinus surgery  HX KNEE ARTHROSCOPY Right  HX ORTHOPAEDIC    
 cortisone shots to both knees  HX OTHER SURGICAL    
 shoulder surgery  HX OTHER SURGICAL    
 eye surgery  HX OTHER SURGICAL  many years ago  
 sinus surgery  HX OTHER SURGICAL    
 knee surgery  HX OTHER SURGICAL  10-22 & 10-29 Epidural steroid injection  HX SHOULDER ARTHROSCOPY Right Patient Active Problem List  
Diagnosis Code  Hypertension I10  
 Diabetes ([address] E11.9  Fatigue R53.83  
 Hyperlipidemia E78.5  Dizziness R42  
 H/O myocardial infarction, greater than 8 weeks I25.2  Chest pain, unspecified R07.9  Back pain M54.9  Aortic valve disorders I35.9

## 2019-01-11 NOTE — DISCHARGE SUMMARY
Discharge Summary    Patient: Tiana Soares MRN: [id number]  CSN: [id number]    YOB: 1932  Age: 80 y.o. Sex: female    DOA: 1/11/2019 LOS:  LOS: 0 days   Discharge Date:      Primary Care Provider:  Stefano Montaño MD    Admission Diagnoses: SOB (shortness of breath) [R06.02], Pre op, Abnormal stress test    Discharge Diagnoses:  Pre op, Shortness of breath   Problem List as of 1/11/2019 Date Reviewed: 4/15/2014          Codes Class Noted - Resolved    Aortic valve disorders ICD-10-CM: I35.9  ICD-9-CM: 424.1  12/17/2013 - Present        Chest pain, unspecified ICD-10-CM: R07.9  ICD-9-CM: 786.50  10/10/2013 - Present        Back pain ICD-10-CM: M54.9  ICD-9-CM: 724.5  10/10/2013 - Present        Dizziness ICD-10-CM: R42  ICD-9-CM: 780.4  6/13/2013 - Present        H/O myocardial infarction, greater than 8 weeks ICD-10-CM: I25.2  ICD-9-CM: 719  6/13/2013 - Present        Hypertension ICD-10-CM: I10  ICD-9-CM: 401.9  Unknown - Present        Diabetes ([facility] [address] ICD-10-CM: E11.9  ICD-9-CM: 250.00  Unknown - Present    Overview Signed 1/3/2011  7:25 PM by Watson Salcedo     adult onset             Fatigue ICD-10-CM: R53.83  ICD-9-CM: 780.79  Unknown - Present        Hyperlipidemia ICD-10-CM: E78.5  ICD-9-CM: 272.4  Unknown - Present        RESOLVED: Palpitations ICD-10-CM: R00.2  ICD-9-CM: 785.1  Unknown - 6/13/2013              Discharge Medications:     Current Discharge Medication List      CONTINUE these medications which have NOT CHANGED    Details   cyanocobalamin (VITAMIN B-12) 1,000 mcg tablet Take 1,000 mcg by mouth daily. montelukast (SINGULAIR) 10 mg tablet Take 10 mg by mouth every evening. gentamicin-prednisoLONE (PRED-G) 0.3-1 % ophthalmic drops Administer 1 Drop to both eyes four (4) times daily. metoprolol succinate (TOPROL-XL) 25 mg XL tablet Take 25 mg by mouth nightly.       multivitamin, tx-iron-ca-min (THERA-M W/ IRON) 9 mg iron-400 mcg tab tablet Take 1 Tab by mouth daily.      Omega-3 Fatty Acids (FISH OIL) 500 mg cap Take  by mouth daily. telmisartan-hydroCHLOROthiazide (MICARDIS HCT) 40-12.5 mg per tablet Take 1 Tab by mouth daily. nitrofurantoin (MACRODANTIN) 50 mg capsule Take 50 mg by mouth every six (6) hours. ferrous sulfate (IRON) 325 mg (65 mg iron) tablet Take  by mouth Daily (before breakfast). acetaminophen (TYLENOL) 325 mg tablet Take  by mouth every four (4) hours as needed for Pain.      sitaGLIPtin (JANUVIA) 25 mg tablet Take 25 mg by mouth daily. levothyroxine (SYNTHROID) 25 mcg tablet Take  by mouth nightly. Calcium Carbonate-Vit D3-Min (CALCIUM-VITAMIN D) 600-400 mg-unit Tab Take 1 Tab by mouth two (2) times a day. aspirin delayed-release 81 mg tablet Take 81 mg by mouth daily. pravastatin (PRAVACHOL) 40 mg tablet Take 40 mg by mouth nightly. esomeprazole (NEXIUM) 40 mg capsule Take 40 mg by mouth daily. acetaminophen-codeine (TYLENOL-CODEINE #3) 300-30 mg per tablet Take 1 Tab by mouth every four (4) hours as needed for Pain. benzonatate (TESSALON) 100 mg capsule Take 100 mg by mouth three (3) times daily as needed for Cough. guaiFENesin-codeine (GUAIFENESIN AC) 100-10 mg/5 mL solution Take 5 mL by mouth every four (4) hours. hydrocortisone (ANUSOL-HC) 2.5 % rectal cream Insert  into rectum two (2) times a day. ondansetron hcl (ZOFRAN) 4 mg tablet Take 4 mg by mouth every eight (8) hours as needed for Nausea. albuterol (PROVENTIL HFA) 90 mcg/actuation inhaler Take 2 Puffs by inhalation every four (4) hours as needed for Wheezing. psyllium (METAMUCIL) packet Take 1 Packet by mouth nightly.              Discharge Condition: Stable    Procedures : LHC and coronary angiogram     Consults: None       PHYSICAL EXAM   Visit Vitals  BP (!) 160/91 (BP 1 Location: Right arm, BP Patient Position: At rest)   Pulse (!) 53   Temp 98.6 °F (37 °C)   Resp 16   Ht 4' 8\" (1.422 m)   Wt 51.7 kg (114 lb)   SpO2 97%   Breastfeeding? No   BMI 25.56 kg/m²     General: Awake, cooperative, no acute distress    HEENT: NC, Atraumatic. PERRLA, EOMI. Anicteric sclerae. Lungs:  CTA Bilaterally. No Wheezing/Rhonchi/Rales. Heart:  Regular  rhythm,  No murmur, No Rubs, No Gallops  Abdomen: Soft, Non distended, Non tender. +Bowel sounds,   Extremities: No c/c/e  Psych:   Not anxious or agitated. Neurologic:  No acute neurological deficits. Admission HPI : See H/P    Hospital Course : Patient came for out patient LHC and coronary angiogram. LHC and Coronary angiogram done via left radial approach. Coronary anatomy described below with noted coronary artery disease. LV gram was not done. LVEDP 6-10 mm hg. No significant gradient on pull back. Left main has luminal irregularities. Left main bifurcates in LAD and LCx. LAD has luminal irregularities. D1 is large caliber long vessel and has luminal irregularities. LAD/D has corkscrew appearance. LCx is non dominant vessel. LCx has luminal irregularities. OM1 is small caliber vessel with luminal irregularities. LCx/OM has corkscrew appearance. RCA is dominant vessel. RCA has luminal irregularities. RPDA and RPLA are medium to large caliber vessel with luminal irregularities. Patient tolerated procedure well. Scant blood loss. No complications. No specimen removed. Recommendation:    Medication considered:   Aspirin, beta blocker, ACEI, Nitrates and statin     CAD risk factor education  Diet education  Control cholesterol  Blood pressure control  Exercise education: Age and functional status appropriate. Patient is at moderate risk for ernie procedural cardiovascular event for back surgery. Activity: No driving for 24 hours, no weight lifting not more than 10 lbs from left hand for 1 week    Diet: Cardiac     Follow-up:  In cardiology clinic after 2 weeks    Disposition: Home    Minutes spent on discharge: 30 minutes       Labs: Results:       Chemistry No results for input(s): GLU, NA, K, CL, CO2, BUN, CREA, CA, AGAP, BUCR, TBIL, GPT, AP, TP, ALB, GLOB, AGRAT in the last 72 hours. CBC w/Diff No results for input(s): WBC, RBC, HGB, HCT, PLT, GRANS, LYMPH, EOS, HGBEXT, HCTEXT, PLTEXT in the last 72 hours. Cardiac Enzymes No results for input(s): CPK, CKND1, BRAYAN in the last 72 hours. No lab exists for component: CKRMB, TROIP   Coagulation No results for input(s): PTP, INR, APTT in the last 72 hours. No lab exists for component: INREXT    Lipid Panel No results found for: CHOL, CHOLPOCT, CHOLX, CHLST, CHOLV, 024144, HDL, LDL, LDLC, DLDLP, 063148, VLDLC, VLDL, TGLX, TRIGL, TRIGP, TGLPOCT, CHHD, CHHDX   BNP No results for input(s): BNPP in the last 72 hours. Liver Enzymes No results for input(s): TP, ALB, TBIL, AP, SGOT, GPT in the last 72 hours. No lab exists for component: DBIL   Thyroid Studies Lab Results   Component Value Date/Time    TSH 1.75 12/20/2015 04:00 PM            Significant Diagnostic Studies: No results found. No results found for this or any previous visit.         CC: Irene Dominguez MD

## 2019-01-11 NOTE — H&P
Date of Surgery Update: 
Luis Solis was seen and examined. History and physical has been reviewed. The patient has been examined. There have been no significant clinical changes since the completion of the originally dated History and Physical. 
 
Patient assessed and is candidate for moderate sedation.    
 
Patient has red dye allergy, she is not allergic to Iodine or IV contrast. 
 
Signed By: Mary Garay MD   
 January 11, 2019 10:19 AM

## 2019-01-11 NOTE — Clinical Note
Contrast Dose Calculator:  
Patient's age: 80.  
Patient's sex: Female. Patient weight (kg) = 51.7. Creatinine level (mg/dL) = 1.44. Creatinine clearance (mL/min): 22.89. Max Contrast dose per Creatinine Cl calculator = 51.5 mL.

## 2019-01-11 NOTE — Clinical Note
Multiple views of the left main, left coronary artery, LAD and circumflex artery obtained using hand injection.

## 2019-01-15 PROBLEM — M48.061 LUMBAR SPINAL STENOSIS: Status: ACTIVE | Noted: 2019-01-15

## 2019-01-15 PROBLEM — M54.10 RADICULOPATHY OF LEG: Status: ACTIVE | Noted: 2019-01-15

## 2019-01-15 PROBLEM — M47.816 LUMBAR SPONDYLOSIS: Status: ACTIVE | Noted: 2019-01-15

## 2019-01-15 NOTE — H&P
Patient Name:   Jose L Sow  YOB: 1932      Chief Complaint:  Low back and hip pain. History of Chief Complaint:  She presents today for followup on her ongoing back pain. She states she is still having significant discomfort across the lower back. She states she was trying to take tramadol for her discomfort; however, it seemed to cause fairly severe constipation, so she has had to go back on the Tylenol which does not seem to control the pain. She states that at this point she cannot even walk due to the discomfort. She rates it is a 9/10. It is a sharp, aching pain. She would like to see about moving forward with surgical intervention due to her significant amount of pain and limited mobility at this time. She has failed conservative care including physical therapy, epidural steroid injections, anti-inflammatories, and analgesics. Past Medical/Surgical History:    Disease/Disorder Type Date Side Surgery Date Side Comment       Eye surgery  bilateral        Lower leg surgery  right        Nasal surgery          Vein surgery - leg  right        Cataract extraction  right    Asthma          Bartholin's gland cyst          Bladder infection          Blind eye   left       Chronic UTI          Diabetes mellitus          GERD          Hyperlipidemia          Hypertension          Kidney infections          Macular degeneration          Macular puckering   left       Myocardial infarction          Osteoarthritis          Osteoporosis          Panniculitis          Peripheral vascular disease          Thyroid disease              Arthroscopy knee 07/14/2004 left        Arthroscopy shoulder 08/05/2002 right    Bronchitis  02/2016          Allergies:    Ingredient Reaction Medication Name Comment   ERYTHROMYCIN BASE Rash     AZITHROMYCIN Rash     RED DYE Dizziness;  Difficulty speaking     PENICILLINS Rash     VENOM-HONEY BEE      LATEX Rash     ASPIRIN      ANESTHETICS - LISA TYPE- PARABENS Stops heart  Almost stops heart when under anesthesia   ANESTHETICS - AMIDE TYPE Stops heart  Almost stops heart when under anesthesia     Current Medications:    Medication Directions   Fish Oil 360 mg-1,200 mg capsule take 1 capsule by oral route  every evening   Centrum Silver Women 8 mg iron-400 mcg-300 mcg tablet    pantoprazole 40 mg tablet,delayed release    fluconazole 150 mg tablet    Vitamin B-12 1,000 mcg tablet    ipratropium bromide 0.03 % nasal spray    calcium carbonate 600 mg (1,500 mg)-vitamin D3 400 unit tablet    telmisartan 40 mg-hydrochlorothiazide 12.5 mg tablet take 1.5 tablet daily. aspirin 81 mg tablet,delayed release    Vitamin B-6 50 mg tablet    Januvia 25 mg tablet    Anucort-HC 25 mg suppository    nitrofurantoin macrocrystal 50 mg capsule    Synthroid 25 mcg tablet    pravastatin 40 mg tablet    FeroSul 325 mg (65 mg iron) tablet    LiquiTears 1.4 % eye drops    lidocaine 5 % topical patch    Ultram 50 mg tablet take 1-2 tabs every 6 hrs as needed for pain   Neurontin 100 mg capsule take 1 capsule by oral route 3 times every day with food     Social History:    SMOKING  Status Tobacco Type Units Per Day Yrs Used   Former smoker        ALCOHOL  There is no history of alcohol use. Family History:    Disease Detail Family Member Age Cause of Death Comments   Heart disease Mother  N      Review of Systems:   Pertinent positives include tendonitis. Pertinent negatives include weight change and fainting. Vitals:  Date BP Pulse Temp (F) Resp. (per min.) Height (Total in.) Weight (lbs.) BMI   10/29/2018 114/82 96 98.3  56.00  25.56   10/11/2018 135/82 97   56.00  25.56   07/17/2018     56.00  25.33     Physical Examination:    The thoracolumbar spine has normal kyphosis and lordosis, a normal appearance, and no scoliosis. No crepitation, deformity, or instability was noted. There is full range of motion of the cervical, thoracic, and lumbar spine and of the hips, knees, and ankles. Ambulation was without antalgia. Straight leg, cross leg, and femoral stretch testing were negative. There was no weakness in the paraspinal muscles or in all myotomes (L2-S1) of the lower extremities. Deep tendon reflexes are present in the Achilles and patellae and were normal,  2/4, bilaterally. Clonus was negative, and Babinski was downgoing. Pulses were 2+ in the lower extremities bilaterally, and no edema was noted. Leg lengths were equal.  The patient could stand and heel walk and toe walk without evidence of neurological deficits. The patient is oriented with no evident mood abnormality. She is in a wheelchair today. She is unable to ambulate due to the discomfort. She is tender across the lumbar paraspinals. There is pain with all ranges of motion. Assessment:   1. Lumbar degenerative disc disease, multilevel. 2.  Multilevel lumbar facet joint arthropathy. 3.  Multilevel lumbar spinal stenosis. 4.  Right lower extremity radiculopathy. 5.  Failure of conservative care including  physical therapy, anti-inflammatories, analgesics, and epidurals. Recommendation:  Unfortunately, at this point, the patient has severe pain. She is unable to ambulate due to her discomfort. We discussed the only option for her to try to get her pain level under control and try to get her ambulatory again would be to move forward with surgical intervention. We discussed, due to her age and comorbidities, we want to be very minimalistic with the decompression. We discussed we would move forward with a right L4 to S1 hemilaminectomy. She would lie to proceed. The risks versus the benefits as well as the alternatives were fully explained to the patient.   Risks include, but are not limited to, paralysis, death, heart attack, stroke, pulmonary embolism, deep vein thrombosis, infection, failure to relieve pain, increase in back or leg pain, reherniation of disc material, need for revision surgery, scarring, spinal fluid leak, bowel or bladder dysfunction, disease transmission, chronic graft site pain, instrumentation failure, pseudarthrosis, and the need for chronic ambulatory assist devices. The patient states full understanding of the risks and benefits and wishes to proceed. At this point, we will have her follow up with us postoperatively and call with any and all concerns.

## 2019-01-21 ENCOUNTER — ANESTHESIA EVENT (OUTPATIENT)
Dept: SURGERY | Age: 84
End: 2019-01-21
Payer: MEDICARE

## 2019-01-22 ENCOUNTER — ANESTHESIA (OUTPATIENT)
Dept: SURGERY | Age: 84
End: 2019-01-22
Payer: MEDICARE

## 2019-01-22 ENCOUNTER — HOSPITAL ENCOUNTER (OUTPATIENT)
Age: 84
Setting detail: OBSERVATION
Discharge: HOME | End: 2019-01-23
Attending: ORTHOPAEDIC SURGERY | Admitting: ORTHOPAEDIC SURGERY
Payer: MEDICARE

## 2019-01-22 ENCOUNTER — APPOINTMENT (OUTPATIENT)
Dept: GENERAL RADIOLOGY | Age: 84
End: 2019-01-22
Attending: ORTHOPAEDIC SURGERY
Payer: MEDICARE

## 2019-01-22 DIAGNOSIS — M47.816 LUMBAR SPONDYLOSIS: Primary | ICD-10-CM

## 2019-01-22 LAB
APPEARANCE UR: CLEAR
BILIRUB UR QL: NEGATIVE
COLOR UR: YELLOW
GLUCOSE BLD STRIP.AUTO-MCNC: 106 MG/DL (ref 70–110)
GLUCOSE BLD STRIP.AUTO-MCNC: 126 MG/DL (ref 70–110)
GLUCOSE BLD STRIP.AUTO-MCNC: 135 MG/DL (ref 70–110)
GLUCOSE BLD STRIP.AUTO-MCNC: 150 MG/DL (ref 70–110)
GLUCOSE UR STRIP.AUTO-MCNC: NEGATIVE MG/DL
HBA1C MFR BLD: 6.2 % (ref 4.2–5.6)
HGB UR QL STRIP: NEGATIVE
KETONES UR QL STRIP.AUTO: NEGATIVE MG/DL
LEUKOCYTE ESTERASE UR QL STRIP.AUTO: NEGATIVE
NITRITE UR QL STRIP.AUTO: NEGATIVE
PH UR STRIP: 7 [PH] (ref 5–8)
PROT UR STRIP-MCNC: NEGATIVE MG/DL
SP GR UR REFRACTOMETRY: 1.01 (ref 1–1.03)
UROBILINOGEN UR QL STRIP.AUTO: 0.2 EU/DL (ref 0.2–1)

## 2019-01-22 PROCEDURE — 74011000250 HC RX REV CODE- 250

## 2019-01-22 PROCEDURE — 76010000149 HC OR TIME 1 TO 1.5 HR: Performed by: ORTHOPAEDIC SURGERY

## 2019-01-22 PROCEDURE — 74011250636 HC RX REV CODE- 250/636

## 2019-01-22 PROCEDURE — 77030018836 HC SOL IRR NACL ICUM -A: Performed by: ORTHOPAEDIC SURGERY

## 2019-01-22 PROCEDURE — 77030006643: Performed by: ANESTHESIOLOGY

## 2019-01-22 PROCEDURE — 36415 COLL VENOUS BLD VENIPUNCTURE: CPT

## 2019-01-22 PROCEDURE — 74011250636 HC RX REV CODE- 250/636: Performed by: ORTHOPAEDIC SURGERY

## 2019-01-22 PROCEDURE — 77030037875 HC DRSG MEPILEX <16IN BORD MOLN -A: Performed by: ORTHOPAEDIC SURGERY

## 2019-01-22 PROCEDURE — 77030003029 HC SUT VCRL J&J -B: Performed by: ORTHOPAEDIC SURGERY

## 2019-01-22 PROCEDURE — 77030020010 HC FCPS BPLR DISP INLC -E: Performed by: ORTHOPAEDIC SURGERY

## 2019-01-22 PROCEDURE — 74011250636 HC RX REV CODE- 250/636: Performed by: ANESTHESIOLOGY

## 2019-01-22 PROCEDURE — 76060000033 HC ANESTHESIA 1 TO 1.5 HR: Performed by: ORTHOPAEDIC SURGERY

## 2019-01-22 PROCEDURE — 77030032490 HC SLV COMPR SCD KNE COVD -B: Performed by: ORTHOPAEDIC SURGERY

## 2019-01-22 PROCEDURE — 81003 URINALYSIS AUTO W/O SCOPE: CPT

## 2019-01-22 PROCEDURE — 77030004435 HC BUR RND STRY -C: Performed by: ORTHOPAEDIC SURGERY

## 2019-01-22 PROCEDURE — 74011250637 HC RX REV CODE- 250/637: Performed by: PHYSICIAN ASSISTANT

## 2019-01-22 PROCEDURE — 77030039266 HC ADH SKN EXOFIN S2SG -A: Performed by: ORTHOPAEDIC SURGERY

## 2019-01-22 PROCEDURE — 77030008683 HC TU ET CUF COVD -A: Performed by: ANESTHESIOLOGY

## 2019-01-22 PROCEDURE — 77030013708 HC HNDPC SUC IRR PULS STRY –B: Performed by: ORTHOPAEDIC SURGERY

## 2019-01-22 PROCEDURE — 77010033678 HC OXYGEN DAILY

## 2019-01-22 PROCEDURE — 74011000258 HC RX REV CODE- 258: Performed by: ANESTHESIOLOGY

## 2019-01-22 PROCEDURE — 74011250636 HC RX REV CODE- 250/636: Performed by: PHYSICIAN ASSISTANT

## 2019-01-22 PROCEDURE — 77030033138 HC SUT PGA STRATFX J&J -B: Performed by: ORTHOPAEDIC SURGERY

## 2019-01-22 PROCEDURE — 76210000000 HC OR PH I REC 2 TO 2.5 HR: Performed by: ORTHOPAEDIC SURGERY

## 2019-01-22 PROCEDURE — 77030020782 HC GWN BAIR PAWS FLX 3M -B: Performed by: ORTHOPAEDIC SURGERY

## 2019-01-22 PROCEDURE — 77030018390 HC SPNG HEMSTAT2 J&J -B: Performed by: ORTHOPAEDIC SURGERY

## 2019-01-22 PROCEDURE — 97163 PT EVAL HIGH COMPLEX 45 MIN: CPT

## 2019-01-22 PROCEDURE — 77030013079 HC BLNKT BAIR HGGR 3M -A: Performed by: ANESTHESIOLOGY

## 2019-01-22 PROCEDURE — 74011000250 HC RX REV CODE- 250: Performed by: ORTHOPAEDIC SURGERY

## 2019-01-22 PROCEDURE — 83036 HEMOGLOBIN GLYCOSYLATED A1C: CPT

## 2019-01-22 PROCEDURE — 99218 HC RM OBSERVATION: CPT

## 2019-01-22 PROCEDURE — 77030012406 HC DRN WND PENRS BARD -A: Performed by: ORTHOPAEDIC SURGERY

## 2019-01-22 PROCEDURE — 74011250637 HC RX REV CODE- 250/637: Performed by: ANESTHESIOLOGY

## 2019-01-22 PROCEDURE — 77030008477 HC STYL SATN SLP COVD -A: Performed by: ANESTHESIOLOGY

## 2019-01-22 PROCEDURE — 77030003666 HC NDL SPINAL BD -A: Performed by: ORTHOPAEDIC SURGERY

## 2019-01-22 PROCEDURE — 77030020255 HC SOL INJ LR 1000ML BG: Performed by: ORTHOPAEDIC SURGERY

## 2019-01-22 PROCEDURE — 82962 GLUCOSE BLOOD TEST: CPT

## 2019-01-22 RX ORDER — CELECOXIB 100 MG/1
200 CAPSULE ORAL ONCE
Status: COMPLETED | OUTPATIENT
Start: 2019-01-22 | End: 2019-01-22

## 2019-01-22 RX ORDER — LIDOCAINE HYDROCHLORIDE 20 MG/ML
INJECTION, SOLUTION EPIDURAL; INFILTRATION; INTRACAUDAL; PERINEURAL AS NEEDED
Status: DISCONTINUED | OUTPATIENT
Start: 2019-01-22 | End: 2019-01-22 | Stop reason: HOSPADM

## 2019-01-22 RX ORDER — NALOXONE HYDROCHLORIDE 0.4 MG/ML
0.4 INJECTION, SOLUTION INTRAMUSCULAR; INTRAVENOUS; SUBCUTANEOUS AS NEEDED
Status: DISCONTINUED | OUTPATIENT
Start: 2019-01-22 | End: 2019-01-23 | Stop reason: HOSPADM

## 2019-01-22 RX ORDER — FLUMAZENIL 0.1 MG/ML
0.2 INJECTION INTRAVENOUS
Status: DISCONTINUED | OUTPATIENT
Start: 2019-01-22 | End: 2019-01-22 | Stop reason: HOSPADM

## 2019-01-22 RX ORDER — MONTELUKAST SODIUM 10 MG/1
10 TABLET ORAL EVERY EVENING
Status: DISCONTINUED | OUTPATIENT
Start: 2019-01-22 | End: 2019-01-23 | Stop reason: HOSPADM

## 2019-01-22 RX ORDER — SODIUM CHLORIDE 0.9 % (FLUSH) 0.9 %
5-40 SYRINGE (ML) INJECTION EVERY 8 HOURS
Status: DISCONTINUED | OUTPATIENT
Start: 2019-01-22 | End: 2019-01-22 | Stop reason: HOSPADM

## 2019-01-22 RX ORDER — TELMISARTAN 40 MG/1
40 TABLET ORAL DAILY
Status: DISCONTINUED | OUTPATIENT
Start: 2019-01-23 | End: 2019-01-23 | Stop reason: HOSPADM

## 2019-01-22 RX ORDER — DEXTROSE 50 % IN WATER (D50W) INTRAVENOUS SYRINGE
25-50 AS NEEDED
Status: DISCONTINUED | OUTPATIENT
Start: 2019-01-22 | End: 2019-01-22 | Stop reason: HOSPADM

## 2019-01-22 RX ORDER — MAGNESIUM SULFATE 100 %
4 CRYSTALS MISCELLANEOUS AS NEEDED
Status: DISCONTINUED | OUTPATIENT
Start: 2019-01-22 | End: 2019-01-23 | Stop reason: HOSPADM

## 2019-01-22 RX ORDER — HYDRALAZINE HYDROCHLORIDE 20 MG/ML
5 INJECTION INTRAMUSCULAR; INTRAVENOUS ONCE
Status: COMPLETED | OUTPATIENT
Start: 2019-01-22 | End: 2019-01-22

## 2019-01-22 RX ORDER — NITROFURANTOIN MACROCRYSTALS 50 MG/1
50 CAPSULE ORAL EVERY 6 HOURS
Status: DISCONTINUED | OUTPATIENT
Start: 2019-01-22 | End: 2019-01-23 | Stop reason: HOSPADM

## 2019-01-22 RX ORDER — PRAVASTATIN SODIUM 20 MG/1
40 TABLET ORAL
Status: DISCONTINUED | OUTPATIENT
Start: 2019-01-22 | End: 2019-01-23 | Stop reason: HOSPADM

## 2019-01-22 RX ORDER — FENTANYL CITRATE 50 UG/ML
50 INJECTION, SOLUTION INTRAMUSCULAR; INTRAVENOUS AS NEEDED
Status: DISCONTINUED | OUTPATIENT
Start: 2019-01-22 | End: 2019-01-22 | Stop reason: HOSPADM

## 2019-01-22 RX ORDER — ALBUTEROL SULFATE 90 UG/1
2 AEROSOL, METERED RESPIRATORY (INHALATION)
Status: DISCONTINUED | OUTPATIENT
Start: 2019-01-22 | End: 2019-01-23 | Stop reason: HOSPADM

## 2019-01-22 RX ORDER — SODIUM CHLORIDE 0.9 % (FLUSH) 0.9 %
5-40 SYRINGE (ML) INJECTION AS NEEDED
Status: DISCONTINUED | OUTPATIENT
Start: 2019-01-22 | End: 2019-01-23 | Stop reason: HOSPADM

## 2019-01-22 RX ORDER — PROPOFOL 10 MG/ML
INJECTION, EMULSION INTRAVENOUS AS NEEDED
Status: DISCONTINUED | OUTPATIENT
Start: 2019-01-22 | End: 2019-01-22 | Stop reason: HOSPADM

## 2019-01-22 RX ORDER — ONDANSETRON 2 MG/ML
4 INJECTION INTRAMUSCULAR; INTRAVENOUS
Status: DISCONTINUED | OUTPATIENT
Start: 2019-01-22 | End: 2019-01-23 | Stop reason: HOSPADM

## 2019-01-22 RX ORDER — SUCCINYLCHOLINE CHLORIDE 20 MG/ML
INJECTION INTRAMUSCULAR; INTRAVENOUS AS NEEDED
Status: DISCONTINUED | OUTPATIENT
Start: 2019-01-22 | End: 2019-01-22 | Stop reason: HOSPADM

## 2019-01-22 RX ORDER — LANOLIN ALCOHOL/MO/W.PET/CERES
1 CREAM (GRAM) TOPICAL
Status: DISCONTINUED | OUTPATIENT
Start: 2019-01-23 | End: 2019-01-23 | Stop reason: HOSPADM

## 2019-01-22 RX ORDER — CYCLOBENZAPRINE HCL 10 MG
10 TABLET ORAL
Status: DISCONTINUED | OUTPATIENT
Start: 2019-01-22 | End: 2019-01-23 | Stop reason: HOSPADM

## 2019-01-22 RX ORDER — SODIUM CHLORIDE, SODIUM LACTATE, POTASSIUM CHLORIDE, CALCIUM CHLORIDE 600; 310; 30; 20 MG/100ML; MG/100ML; MG/100ML; MG/100ML
70 INJECTION, SOLUTION INTRAVENOUS CONTINUOUS
Status: DISCONTINUED | OUTPATIENT
Start: 2019-01-22 | End: 2019-01-23

## 2019-01-22 RX ORDER — MAGNESIUM SULFATE 100 %
4 CRYSTALS MISCELLANEOUS AS NEEDED
Status: DISCONTINUED | OUTPATIENT
Start: 2019-01-22 | End: 2019-01-22 | Stop reason: HOSPADM

## 2019-01-22 RX ORDER — MIDAZOLAM HYDROCHLORIDE 1 MG/ML
INJECTION, SOLUTION INTRAMUSCULAR; INTRAVENOUS AS NEEDED
Status: DISCONTINUED | OUTPATIENT
Start: 2019-01-22 | End: 2019-01-22 | Stop reason: HOSPADM

## 2019-01-22 RX ORDER — DEXTROSE 50 % IN WATER (D50W) INTRAVENOUS SYRINGE
25-50 AS NEEDED
Status: DISCONTINUED | OUTPATIENT
Start: 2019-01-22 | End: 2019-01-23 | Stop reason: HOSPADM

## 2019-01-22 RX ORDER — METOPROLOL SUCCINATE 25 MG/1
25 TABLET, EXTENDED RELEASE ORAL
Status: DISCONTINUED | OUTPATIENT
Start: 2019-01-22 | End: 2019-01-23 | Stop reason: HOSPADM

## 2019-01-22 RX ORDER — BISACODYL 5 MG
5 TABLET, DELAYED RELEASE (ENTERIC COATED) ORAL DAILY PRN
Status: DISCONTINUED | OUTPATIENT
Start: 2019-01-22 | End: 2019-01-23 | Stop reason: HOSPADM

## 2019-01-22 RX ORDER — EPHEDRINE SULFATE/0.9% NACL/PF 25 MG/5 ML
SYRINGE (ML) INTRAVENOUS AS NEEDED
Status: DISCONTINUED | OUTPATIENT
Start: 2019-01-22 | End: 2019-01-22 | Stop reason: HOSPADM

## 2019-01-22 RX ORDER — FAMOTIDINE 20 MG/1
20 TABLET, FILM COATED ORAL EVERY 12 HOURS
Status: DISCONTINUED | OUTPATIENT
Start: 2019-01-22 | End: 2019-01-23 | Stop reason: HOSPADM

## 2019-01-22 RX ORDER — BENZONATATE 100 MG/1
100 CAPSULE ORAL
Status: DISCONTINUED | OUTPATIENT
Start: 2019-01-22 | End: 2019-01-23 | Stop reason: HOSPADM

## 2019-01-22 RX ORDER — HYDROCHLOROTHIAZIDE 12.5 MG/1
12.5 CAPSULE ORAL DAILY
Status: DISCONTINUED | OUTPATIENT
Start: 2019-01-23 | End: 2019-01-23 | Stop reason: HOSPADM

## 2019-01-22 RX ORDER — SODIUM CHLORIDE 0.9 % (FLUSH) 0.9 %
5-40 SYRINGE (ML) INJECTION AS NEEDED
Status: DISCONTINUED | OUTPATIENT
Start: 2019-01-22 | End: 2019-01-22 | Stop reason: HOSPADM

## 2019-01-22 RX ORDER — GLYCOPYRROLATE 0.2 MG/ML
INJECTION INTRAMUSCULAR; INTRAVENOUS AS NEEDED
Status: DISCONTINUED | OUTPATIENT
Start: 2019-01-22 | End: 2019-01-22 | Stop reason: HOSPADM

## 2019-01-22 RX ORDER — OXYCODONE AND ACETAMINOPHEN 5; 325 MG/1; MG/1
1-2 TABLET ORAL
Status: DISCONTINUED | OUTPATIENT
Start: 2019-01-22 | End: 2019-01-23 | Stop reason: HOSPADM

## 2019-01-22 RX ORDER — DIPHENHYDRAMINE HYDROCHLORIDE 50 MG/ML
12.5 INJECTION, SOLUTION INTRAMUSCULAR; INTRAVENOUS
Status: DISCONTINUED | OUTPATIENT
Start: 2019-01-22 | End: 2019-01-23 | Stop reason: HOSPADM

## 2019-01-22 RX ORDER — ESMOLOL HYDROCHLORIDE 10 MG/ML
INJECTION INTRAVENOUS AS NEEDED
Status: DISCONTINUED | OUTPATIENT
Start: 2019-01-22 | End: 2019-01-22 | Stop reason: HOSPADM

## 2019-01-22 RX ORDER — PANTOPRAZOLE SODIUM 40 MG/1
40 TABLET, DELAYED RELEASE ORAL DAILY
Status: DISCONTINUED | OUTPATIENT
Start: 2019-01-23 | End: 2019-01-23 | Stop reason: HOSPADM

## 2019-01-22 RX ORDER — SODIUM CHLORIDE 0.9 % (FLUSH) 0.9 %
5-40 SYRINGE (ML) INJECTION EVERY 8 HOURS
Status: DISCONTINUED | OUTPATIENT
Start: 2019-01-22 | End: 2019-01-23 | Stop reason: HOSPADM

## 2019-01-22 RX ORDER — ROCURONIUM BROMIDE 10 MG/ML
INJECTION, SOLUTION INTRAVENOUS AS NEEDED
Status: DISCONTINUED | OUTPATIENT
Start: 2019-01-22 | End: 2019-01-22 | Stop reason: HOSPADM

## 2019-01-22 RX ORDER — FENTANYL CITRATE 50 UG/ML
INJECTION, SOLUTION INTRAMUSCULAR; INTRAVENOUS AS NEEDED
Status: DISCONTINUED | OUTPATIENT
Start: 2019-01-22 | End: 2019-01-22 | Stop reason: HOSPADM

## 2019-01-22 RX ORDER — ONDANSETRON 2 MG/ML
INJECTION INTRAMUSCULAR; INTRAVENOUS AS NEEDED
Status: DISCONTINUED | OUTPATIENT
Start: 2019-01-22 | End: 2019-01-22 | Stop reason: HOSPADM

## 2019-01-22 RX ORDER — LEVOTHYROXINE SODIUM 25 UG/1
25 TABLET ORAL
Status: DISCONTINUED | OUTPATIENT
Start: 2019-01-22 | End: 2019-01-23 | Stop reason: HOSPADM

## 2019-01-22 RX ORDER — INSULIN LISPRO 100 [IU]/ML
INJECTION, SOLUTION INTRAVENOUS; SUBCUTANEOUS
Status: DISCONTINUED | OUTPATIENT
Start: 2019-01-22 | End: 2019-01-23 | Stop reason: HOSPADM

## 2019-01-22 RX ORDER — NALOXONE HYDROCHLORIDE 0.4 MG/ML
0.1 INJECTION, SOLUTION INTRAMUSCULAR; INTRAVENOUS; SUBCUTANEOUS AS NEEDED
Status: DISCONTINUED | OUTPATIENT
Start: 2019-01-22 | End: 2019-01-22 | Stop reason: HOSPADM

## 2019-01-22 RX ORDER — HYDROMORPHONE HYDROCHLORIDE 2 MG/ML
0.5 INJECTION, SOLUTION INTRAMUSCULAR; INTRAVENOUS; SUBCUTANEOUS
Status: DISCONTINUED | OUTPATIENT
Start: 2019-01-22 | End: 2019-01-22 | Stop reason: HOSPADM

## 2019-01-22 RX ORDER — ACETAMINOPHEN 500 MG
1000 TABLET ORAL ONCE
Status: COMPLETED | OUTPATIENT
Start: 2019-01-22 | End: 2019-01-22

## 2019-01-22 RX ORDER — NEOSTIGMINE METHYLSULFATE 5 MG/5 ML
SYRINGE (ML) INTRAVENOUS AS NEEDED
Status: DISCONTINUED | OUTPATIENT
Start: 2019-01-22 | End: 2019-01-22 | Stop reason: HOSPADM

## 2019-01-22 RX ORDER — SODIUM CHLORIDE, SODIUM LACTATE, POTASSIUM CHLORIDE, CALCIUM CHLORIDE 600; 310; 30; 20 MG/100ML; MG/100ML; MG/100ML; MG/100ML
125 INJECTION, SOLUTION INTRAVENOUS CONTINUOUS
Status: DISCONTINUED | OUTPATIENT
Start: 2019-01-22 | End: 2019-01-23

## 2019-01-22 RX ADMIN — FAMOTIDINE 20 MG: 20 TABLET ORAL at 21:50

## 2019-01-22 RX ADMIN — CELECOXIB 200 MG: 100 CAPSULE ORAL at 08:07

## 2019-01-22 RX ADMIN — GLYCOPYRROLATE 0.4 MG: 0.2 INJECTION INTRAMUSCULAR; INTRAVENOUS at 10:34

## 2019-01-22 RX ADMIN — METOPROLOL SUCCINATE 25 MG: 25 TABLET, EXTENDED RELEASE ORAL at 21:52

## 2019-01-22 RX ADMIN — ONDANSETRON 4 MG: 2 INJECTION INTRAMUSCULAR; INTRAVENOUS at 18:11

## 2019-01-22 RX ADMIN — SUCCINYLCHOLINE CHLORIDE 120 MG: 20 INJECTION INTRAMUSCULAR; INTRAVENOUS at 09:29

## 2019-01-22 RX ADMIN — FENTANYL CITRATE 25 MCG: 50 INJECTION, SOLUTION INTRAMUSCULAR; INTRAVENOUS at 09:28

## 2019-01-22 RX ADMIN — VANCOMYCIN HYDROCHLORIDE 1000 MG: 10 INJECTION, POWDER, LYOPHILIZED, FOR SOLUTION INTRAVENOUS at 21:48

## 2019-01-22 RX ADMIN — LEVOTHYROXINE SODIUM 25 MCG: 25 TABLET ORAL at 21:50

## 2019-01-22 RX ADMIN — ACETAMINOPHEN 1000 MG: 500 TABLET, FILM COATED ORAL at 08:07

## 2019-01-22 RX ADMIN — ROCURONIUM BROMIDE 10 MG: 10 INJECTION, SOLUTION INTRAVENOUS at 09:28

## 2019-01-22 RX ADMIN — HYDRALAZINE HYDROCHLORIDE 5 MG: 20 INJECTION INTRAMUSCULAR; INTRAVENOUS at 12:24

## 2019-01-22 RX ADMIN — ONDANSETRON 4 MG: 2 INJECTION INTRAMUSCULAR; INTRAVENOUS at 10:30

## 2019-01-22 RX ADMIN — FENTANYL CITRATE 75 MCG: 50 INJECTION, SOLUTION INTRAMUSCULAR; INTRAVENOUS at 10:32

## 2019-01-22 RX ADMIN — PROPOFOL 90 MG: 10 INJECTION, EMULSION INTRAVENOUS at 09:28

## 2019-01-22 RX ADMIN — SODIUM CHLORIDE, SODIUM LACTATE, POTASSIUM CHLORIDE, AND CALCIUM CHLORIDE 125 ML/HR: 600; 310; 30; 20 INJECTION, SOLUTION INTRAVENOUS at 07:26

## 2019-01-22 RX ADMIN — LIDOCAINE HYDROCHLORIDE 60 MG: 20 INJECTION, SOLUTION EPIDURAL; INFILTRATION; INTRACAUDAL; PERINEURAL at 09:28

## 2019-01-22 RX ADMIN — SODIUM CHLORIDE, SODIUM LACTATE, POTASSIUM CHLORIDE, AND CALCIUM CHLORIDE 70 ML/HR: 600; 310; 30; 20 INJECTION, SOLUTION INTRAVENOUS at 18:11

## 2019-01-22 RX ADMIN — SODIUM CHLORIDE, SODIUM LACTATE, POTASSIUM CHLORIDE, AND CALCIUM CHLORIDE: 600; 310; 30; 20 INJECTION, SOLUTION INTRAVENOUS at 10:15

## 2019-01-22 RX ADMIN — SODIUM CHLORIDE, SODIUM LACTATE, POTASSIUM CHLORIDE, AND CALCIUM CHLORIDE 70 ML/HR: 600; 310; 30; 20 INJECTION, SOLUTION INTRAVENOUS at 20:49

## 2019-01-22 RX ADMIN — ROCURONIUM BROMIDE 10 MG: 10 INJECTION, SOLUTION INTRAVENOUS at 09:31

## 2019-01-22 RX ADMIN — PROMETHAZINE HYDROCHLORIDE 6.25 MG: 25 INJECTION, SOLUTION INTRAMUSCULAR; INTRAVENOUS at 12:30

## 2019-01-22 RX ADMIN — VANCOMYCIN HYDROCHLORIDE 750 MG: 750 INJECTION, POWDER, LYOPHILIZED, FOR SOLUTION INTRAVENOUS at 08:40

## 2019-01-22 RX ADMIN — Medication 2 MG: at 10:34

## 2019-01-22 RX ADMIN — Medication 10 MG: at 09:59

## 2019-01-22 RX ADMIN — HYDROMORPHONE HYDROCHLORIDE 0.5 MG: 2 INJECTION INTRAMUSCULAR; INTRAVENOUS; SUBCUTANEOUS at 11:28

## 2019-01-22 RX ADMIN — HYDROMORPHONE HYDROCHLORIDE 0.5 MG: 2 INJECTION INTRAMUSCULAR; INTRAVENOUS; SUBCUTANEOUS at 12:08

## 2019-01-22 RX ADMIN — NITROFURANTOIN MACROCRYSTALS 50 MG: 50 CAPSULE ORAL at 21:49

## 2019-01-22 RX ADMIN — VANCOMYCIN HYDROCHLORIDE 0.78 G: 750 INJECTION, POWDER, LYOPHILIZED, FOR SOLUTION INTRAVENOUS at 09:20

## 2019-01-22 RX ADMIN — ESMOLOL HYDROCHLORIDE 20 MG: 10 INJECTION INTRAVENOUS at 10:45

## 2019-01-22 RX ADMIN — PRAVASTATIN SODIUM 40 MG: 20 TABLET ORAL at 21:52

## 2019-01-22 RX ADMIN — MIDAZOLAM HYDROCHLORIDE 2 MG: 1 INJECTION, SOLUTION INTRAMUSCULAR; INTRAVENOUS at 09:19

## 2019-01-22 NOTE — PERIOP NOTES
TRANSFER - IN REPORT:    Verbal report received from K. Macel Gitelman RN and DANIEL Murphy CRNA(name) on Luis Solis  being received from OR(unit) for routine post - op      Report consisted of patients Situation, Background, Assessment and   Recommendations(SBAR). Information from the following report(s) SBAR, OR Summary, Procedure Summary and Intake/Output was reviewed with the receiving nurse. Opportunity for questions and clarification was provided. Assessment completed upon patients arrival to unit and care assumed.

## 2019-01-22 NOTE — PERIOP NOTES
Spoke with Samantha Seals and notified him patient c/o nausea, green emesis bag provided. Ordered phenergan IVPB 6.25 mg x 1 dose now and repeat if needed. Also notified him patient is intermittently hypertensive, both pre-induction and pre-op BP were elevated as well. He ordered hydralazine IV 5 mg x 1 dose now. Read back and verified. Will continue to monitor.

## 2019-01-22 NOTE — ANESTHESIA POSTPROCEDURE EVALUATION
Post-Anesthesia Evaluation and Assessment Cardiovascular Function/Vital Signs Visit Vitals /44 Pulse (!) 59 Temp 36.6 °C (97.8 °F) Resp 11 Ht 4' 8\" (1.422 m) Wt 50 kg (110 lb 4 oz) SpO2 100% BMI 24.72 kg/m² Patient is status post Procedure(s): RIGHT LUMBAR 4- SACRAL 1 HEMILAMINECTOMY AND DISCTECTOMY WITH C-ARM, \"SPEC POP\". Nausea/Vomiting: Controlled. Postoperative hydration reviewed and adequate. Pain: 
Pain Scale 1: Numeric (0 - 10) (01/22/19 1120) Pain Intensity 1: 8 (01/22/19 1120) Managed. Neurological Status:  
Neuro (WDL): Exceptions to WDL (01/22/19 1205) At baseline. Mental Status and Level of Consciousness: Arousable. Pulmonary Status:  
O2 Device: Nasal cannula (01/22/19 1113) Adequate oxygenation and airway patent. Complications related to anesthesia: None Post-anesthesia assessment completed. No concerns. Patient has met all discharge requirements. Signed By: Roro Campbell MD  
 January 22, 2019 Procedure(s): RIGHT LUMBAR 4- SACRAL 1 HEMILAMINECTOMY AND DISCTECTOMY WITH C-ARM, \"SPEC POP\". <BSHSIANPOST> Visit Vitals /44 Pulse (!) 59 Temp 36.6 °C (97.8 °F) Resp 11 Ht 4' 8\" (1.422 m) Wt 50 kg (110 lb 4 oz) SpO2 100% BMI 24.72 kg/m²

## 2019-01-22 NOTE — PERIOP NOTES
TRANSFER - OUT REPORT:    Verbal report given to Souyma(name) on Gaurangi LAKHWINDER Solis  being transferred to (unit) for routine post - op       Report consisted of patients Situation, Background, Assessment and   Recommendations(SBAR). Information from the following report(s) SBAR, Kardex, OR Summary, Procedure Summary, Intake/Output and MAR was reviewed with the receiving nurse. Lines:   Peripheral IV 01/22/19 Right Wrist (Active)   Site Assessment Clean, dry, & intact 1/22/2019 12:05 PM   Phlebitis Assessment 0 1/22/2019 12:05 PM   Infiltration Assessment 0 1/22/2019 12:05 PM   Dressing Status Clean, dry, & intact 1/22/2019 12:05 PM   Dressing Type Transparent;Tape 1/22/2019 12:05 PM   Hub Color/Line Status Pink; Infusing;Patent 1/22/2019 12:05 PM   Alcohol Cap Used No 1/22/2019  7:38 AM        Opportunity for questions and clarification was provided.       Patient transported with:   Registered Nurse  Tech

## 2019-01-22 NOTE — OP NOTES
OPERATIVE NOTE    Patient: Alanna Cash MRN: [id number]  SSN: [id number]    YOB: 1932  Age: 80 y.o. Sex: female      Indications: This is a 80y.o. year-old female who presents with back and severe right leg pain. She was positive for stenosis/scoliosis/osteoporosis. The patient was admitted for surgery as conservative measures have failed. Date of Procedure: 1/22/2019     Preoperative Diagnosis: LUMBAR SPONDYLOSIS WITH RADICULOPATHY AND LUMBAR STENOSIS AND LUMBOSACRAL STENOSIS    Postoperative Diagnosis: LUMBAR SPONDYLOSIS WITH RADICULOPATHY AND LUMBAR STENOSIS AND LUMBOSACRAL STENOSIS      Procedure: L4,L5,S1 right hemilaminectomy with unilateral noninstrumented grafting and fusion L5-S1 with fluoro. Surgeon: Moe Alva DO,Surgical Assistant: Nidia Lacey PA-C    Assistant: Samaria Sick: Henrique Franco RN  Physician Assistant: PRICILLA Lassiter  Scrub Tech-1: Vee Ladd  Surg Asst-1: Kalee Hand    Anesthesia: Ana Champion    Estimated Blood Loss: 50cc    Specimens: * No specimens in log *     Drains: Hemovac    Implants: * No implants in log *    Complications: None; patient tolerated the procedure well. Procedure: The patent was greeted by anesthesia and taken to the operative suite where the patient underwent general endotracheal anesthesia. The patient was positioned in the prone position on a standard radiolucent Ronal spine table. A weiss catheter was placed. The lumbar spine was sterilely prepped and draped in the standard fashion. Flouroscopy confirmed the appropriate segments and an incision was made over the posterior spinous processes. The paraspinal musculature was elevated with electrocautery. A high speed bur was utilized to outline the laminectomy site and a complete decompressive lumbar laminectomy was subsequently performed secondary to spinal stenosis. Complete or partial laminectomy was performed at  L-4-S1 on the right. .  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 Aggressive medial facetectomies were performed at all levels with partial or complete facetectomies as needed to decompress the exiting spinal nerve roots. A Foraminotomy was performed over each exiting nerve root to assure neurologic decompression. The pars were left intact at L4  to prevent post-laminectomy instability. The transverse processes at L5 and sacral ala on the right were decorticated and grafted with local bone 5cc for non instrumented fusion for this partially unstable L5-S1 segment. The patient has significant osteoporosis and pedicle screws are unlikely to secure into her vertebra without risk for significant complication. At the completion of the decompression there was no evidence of residual neurological encroachment at any level. The tissues were irrigated with 1000 ml of sterile saline with bacitracin utilizing pulsatile lavage. All bleeding was cauterized with bipolar electrocautery or gel-foam hemostatic agents. A deep hemovac drain was placed. The fascia was re-approximated with 1-0 Vicryl, the skin edges were re-approximated with 2-0 Vicryl and the skin was closed with a running 3-0 Monocryl. A layer of Dermabond Prineo skin sealant was placed as well as a Mepalex dressing. The patient recovered from anesthesia and was transferred to the post-anesthesia care unit in stable condition.   Demond Thompson MD  1/22/2019  2:40 PM

## 2019-01-22 NOTE — PERIOP NOTES
Found to be incontinent of urine prior to departure PACU, all bed linens changed and skin care provided. Dual skin check completed with Bao Heck.    Visit Vitals  /50   Pulse 60   Temp 97.7 °F (36.5 °C)   Resp 16   Ht 4' 8\" (1.422 m)   Wt 50 kg (110 lb 4 oz)   SpO2 100%   BMI 24.72 kg/m²    at bedside in PACU and directed to [address] [place] [address] waiting room until patient arrives in 216 and is assessed by receiving nurse

## 2019-01-22 NOTE — PROGRESS NOTES
Problem: Mobility Impaired (Adult and Pediatric)  Goal: *Acute Goals and Plan of Care (Insert Text)  In 1-7 days pt will be able to perform:  STG  1. Bed mobility:  Demonstrate proper log-roll technique for safe initiation of rolling for OOB activities. 2.  Supine to sit to supine S with HR for meals. 3.  Sit to stand to sit S with RW/LSO in prep for ambulation. LT.  Gait:  Ambulate 50ft S with RW/LSO, for home/community mobility. 2.  Stair Negotiation:  Ascend/descend >3 steps CGA with HR for home entry. 3.  Activity tolerance: Tolerate up in chair 30 minutes-1 hour for ADLs. 4.  Patient/Family Education:  Patient/family to be independent with HEP for follow-up care and safe discharge. physical Therapy EVALUATION    Patient: Luis Toscano (80 y.o. female)  Date: 2019  Primary Diagnosis: LUMBAR SPONDYLOSIS WITH RADICULOPATHY AND LUMBAR STENOSIS AND LUMBOSACRAL STENOSIS  Lumbar spinal stenosis  Procedure(s) (LRB):  RIGHT LUMBAR 4- SACRAL 1 HEMILAMINECTOMY AND DISCTECTOMY WITH C-ARM, \"SPEC POP\" (Right) Day of Surgery   Precautions:   Fall, Back, Spinal    ASSESSMENT :  Based on the objective data described below, the patient presents with decreased functional mobility and independence in regard to bed mobility, transfers, gt quality and tolerance, AROM, generalized strength, pain, balance, activity tolerance, stair negotiation and safety due to recent back surgery. Pt rating pain on numerical pain scale 0/10. Pt very drowsy and required multiple vc/tc for adhering to tasks/activities. Pt kept drifting back to sleep requiring frequent instructions. Pt required min A/mod A for log rolling, supine<>sidelying<>sit. Pt required vc for safe techniques. Pt required constant A for maintaining sitting and too drowsy to participate in sit>stand. Pt returned to supine in bed w/ all needs within reach, SCDs applied.   Per , pt will remain drowsy and nauseated for 6 hours after surgery as he states this is \"her norm after surgery. \"   reports pt was able to ambulate short distances in home prior to surgery today using std walker intermittently. Nurse Tay Goff aware. Recommend [facility] d/c. Patient will benefit from skilled intervention to address the above impairments. Patients rehabilitation potential is considered to be Fair  Factors which may influence rehabilitation potential include:   []         None noted  []         Mental ability/status  [x]         Medical condition  []         Home/family situation and support systems  [x]         Safety awareness  [x]         Pain tolerance/management  []         Other:      PLAN :  Recommendations and Planned Interventions:  [x]           Bed Mobility Training             []    Neuromuscular Re-Education  [x]           Transfer Training                   []    Orthotic/Prosthetic Training  [x]           Gait Training                          []    Modalities  []           Therapeutic Exercises          []    Edema Management/Control  [x]           Therapeutic Activities            [x]    Patient and Family Training/Education  []           Other (comment):    Frequency/Duration: Patient will be followed by physical therapy twice daily to address goals.   Discharge Recommendations: Home Health  Further Equipment Recommendations for Discharge: N/A     SUBJECTIVE:   Patient stated Oh I am.    OBJECTIVE DATA SUMMARY:     Past Medical History:   Diagnosis Date    Adverse effect of anesthesia     very, very sleepy with Anesthesia    Adverse effect of anesthesia     decreased heartrate with colonoscopy    Anemia     Arthritis     osteoarthritis    CAD (coronary artery disease)     Depression     Diabetes ([facility] [address]     adult onset    Difficult intubation     went bradycardc with symptoms, had diificuly waking up for 24 hours    Foot fracture 2008    right    GERD (gastroesophageal reflux disease)     Heart attack ([address] 2005    Hypercholesterolemia     Hyperlipidemia     Hypertension     Ill-defined condition 10/2018    reddened itchy bumps on lower abdomen after steroid injections in October, itchy patient states, very red, slightly opened with yellow looking head on them.     Ill-defined condition     history of frequent uti's, especially with stress and pressure on the stomach    Ill-defined condition     history of post nasal drip    Ill-defined condition     history of frequent small BM's at night    Ill-defined condition     recurren bladder infection    Menopause     Mitral regurgitation     Myocardial infarct (HCC)     Nausea & vomiting     severe nausea    Sick sinus syndrome (HCC)     Thyroid disease      Past Surgical History:   Procedure Laterality Date    HX CATARACT REMOVAL Right     HX GI      colonosopy    HX GYN      bartholin cyst    HX HEART CATHETERIZATION  2005    HX HEENT Bilateral     macular puckering    HX HEENT      sinus surgery    HX KNEE ARTHROSCOPY Right     HX ORTHOPAEDIC      cortisone shots to both knees    HX OTHER SURGICAL      shoulder surgery    HX OTHER SURGICAL      eye surgery    HX OTHER SURGICAL  many years ago    sinus surgery    HX OTHER SURGICAL      knee surgery    HX OTHER SURGICAL  10-22 & 10-29    Epidural steroid injection    HX SHOULDER ARTHROSCOPY Right           Barriers to Learning/Limitations: None  Compensate with: visual, verbal, tactile, kinesthetic cues/model  Prior Level of Function/Home Situation:   Home Situation  Home Environment: Private residence  # Steps to Enter: 2  Rails to Enter: Yes  Hand Rails : Bilateral  One/Two Story Residence: One story  Living Alone: No  Support Systems: Spouse/Significant Other/Partner  Patient Expects to be Discharged to[de-identified] Private residence  Current DME Used/Available at Home: None  Critical Behavior:  Neurologic State: Drowsy  Cognition: No command following;Decreased attention/concentration  Psychosocial  Patient Behaviors: Calm; Not interactive  Skin Condition/Temp: Dry;Warm  Skin Integrity: Incision (comment)(back)  Skin Integumentary  Skin Color: Appropriate for ethnicity  Skin Condition/Temp: Dry;Warm  Skin Integrity: Incision (comment)(back)  Strength:    Strength: Generally decreased, functional  Tone & Sensation:   Tone: Normal  Sensation: (unable to assess due to pt being too drowsy)  Range Of Motion:  AROM: Generally decreased, functional  PROM: Generally decreased, functional  Functional Mobility:  Bed Mobility:  Rolling: Minimum assistance; Additional time(vc)  Supine to Sit: Additional time; Moderate assistance(vc)  Sit to Supine: Moderate assistance(vc)  Transfers:  Balance:   Sitting: Impaired; With support  Sitting - Static: Poor (constant support)  Sitting - Dynamic: None  Ambulation/Gait Training:   Therapeutic Exercises:   Pain:  Pain Scale 1: Numeric (0 - 10)  Pain Intensity 1: 0  Pain Location 1: Back  Pain Orientation 1: Mid  Pain Description 1: Aching  Pain Intervention(s) 1: Medication (see MAR); Distraction;Music;Repositioned  Activity Tolerance:   Fair   Please refer to the flowsheet for vital signs taken during this treatment. After treatment:   []         Patient left in no apparent distress sitting up in chair  [x]         Patient left in no apparent distress in bed  [x]         Call bell left within reach  [x]         Nursing notified  []         Caregiver present  []         Bed alarm activated    COMMUNICATION/EDUCATION:   [x]         Fall prevention education was provided and the patient/caregiver indicated understanding. [x]         Patient/family have participated as able in goal setting and plan of care. [x]         Patient/family agree to work toward stated goals and plan of care. []         Patient understands intent and goals of therapy, but is neutral about his/her participation. []         Patient is unable to participate in goal setting and plan of care.     Thank you for this referral.  Parth Engel, PT   Time Calculation: 19 mins      Eval Complexity: History: HIGH Complexity :3+ comorbidities / personal factors will impact the outcome/ POC Exam:MEDIUM Complexity : 3 Standardized tests and measures addressing body structure, function, activity limitation and / or participation in recreation  Presentation: LOW Complexity : Stable, uncomplicated  Clinical Decision Making:Low Complexity amb >30' Overall Complexity:LOW

## 2019-01-22 NOTE — PERIOP NOTES
Reviewed PTA medication list with patient/caregiver and patient/caregiver denies any additional medications. Patient admits to having a responsible adult care for them at home for at least 24 hours after surgery. Pt denies having an active cough and confirms being able to lie still in the supine position for at least 20 minutes. Patient denies karla chewing/swallowing difficulties. Dual skin assessment & fall risk band verification completed with Darrol Crigler, RN.

## 2019-01-22 NOTE — PERIOP NOTES
Attempted to give report, [facility] unable to accept patient at this time- she will call pacu back.

## 2019-01-22 NOTE — PERIOP NOTES
Allergy list states that the patient is allergic to all \"mycins\", but  states that the patient can have Vancomycin. Called into OR 7 and spoke with [facility] who relayed this information to [address].  states that it is ok for the patient to have Vancomycin. [facility] states that she will call holding when the doctor wants the Vancomycin started.

## 2019-01-22 NOTE — ANESTHESIA PREPROCEDURE EVALUATION
Anesthetic History Increased risk of difficult airway and PONV Review of Systems / Medical History Patient summary reviewed, nursing notes reviewed and pertinent labs reviewed Pulmonary Within defined limits Neuro/Psych Psychiatric history Cardiovascular Hypertension Dysrhythmias CAD Exercise tolerance: <4 METS 
  
GI/Hepatic/Renal 
  
GERD Endo/Other Diabetes Hypothyroidism Arthritis Other Findings Physical Exam 
 
Airway Mallampati: III 
TM Distance: 4 - 6 cm Neck ROM: normal range of motion Mouth opening: Normal 
 
 Cardiovascular Rhythm: regular Rate: normal 
 
 
 
 Dental 
 
Dentition: Lower partial plate Pulmonary Breath sounds clear to auscultation Abdominal 
GI exam deferred Other Findings Anesthetic Plan ASA: 3 Anesthesia type: general 
 
 
 
 
Induction: Intravenous Anesthetic plan and risks discussed with: Patient

## 2019-01-22 NOTE — PERIOP NOTES
Called  and informed him that the patient states she has been coughing up milky, thick mucous recently and that her lower lungs sounds had slight crackles in the bases. Dr. Cole Childers states that he will assess the patient at this time.

## 2019-01-22 NOTE — INTERVAL H&P NOTE
H&P Update:  Luis Solis was seen and examined. History and physical has been reviewed. The patient has been examined. There have been no significant clinical changes since the completion of the originally dated History and Physical.  Patient identified by surgeon; surgical site was confirmed by patient and surgeon.     Signed By: Antoinette Christensen MD     January 22, 2019 9:09 AM

## 2019-01-22 NOTE — CONSULTS
Medicine Consult    Patient:  Luis Solis 80 y.o. female  Asked to evaluate patient by Dr. Kyle Ivory  Primary Care Provider:  Bailey Denson MD  Date of Admission:  1/22/2019  Reason for Consult: Med Mgmt        Assessment/Plan     Patient Active Problem List   Diagnosis Code    Hypertension I10    Diabetes ([facility] [address] E11.9    Fatigue R53.83    Hyperlipidemia E78.5    Dizziness R44    H/O myocardial infarction, greater than 8 weeks I25.2    Chest pain, unspecified R07.9    Back pain M54.9    Aortic valve disorders I35.9    Lumbar spinal stenosis M48.061    Lumbar spondylosis M47.816    Radiculopathy of leg M54.10     79 y/o female with multiple chronic medical issues s/p lumbar spine surgery for lumbar radiculopathy. DM2.  GERD.  HTN. Hyperlipidemia. Hypothyroidism. -DM surveillance and mgmt.  -Continue bp meds. -Continue ppi for GERD. -Continue current dose of levothyroxine.  -Post op care  / DVT prevention per Orthopaedics. HPI:   CC:med mgmt  Luis Campbell is a 80 y.o. female with multiple medical issues who is s/p lumbar spine surgery. We have been asked to follow her chronic medical issues. Past Medical History:   Diagnosis Date    Adverse effect of anesthesia     very, very sleepy with Anesthesia    Adverse effect of anesthesia     decreased heartrate with colonoscopy    Anemia     Arthritis     osteoarthritis    CAD (coronary artery disease)     Depression     Diabetes ([facility] [address]     adult onset    Difficult intubation     went bradycardc with symptoms, had diificuly waking up for 24 hours    Foot fracture 2008    right    GERD (gastroesophageal reflux disease)     Heart attack ([facility] [address] 2005    Hypercholesterolemia     Hyperlipidemia     Hypertension     Ill-defined condition 10/2018    reddened itchy bumps on lower abdomen after steroid injections in October, itchy patient states, very red, slightly opened with yellow looking head on them.     Ill-defined condition history of frequent uti's, especially with stress and pressure on the stomach    Ill-defined condition     history of post nasal drip    Ill-defined condition     history of frequent small BM's at night    Ill-defined condition     recurren bladder infection    Menopause     Mitral regurgitation     Myocardial infarct (HCC)     Nausea & vomiting     severe nausea    Sick sinus syndrome (HCC)     Thyroid disease      Past Surgical History:   Procedure Laterality Date    HX CATARACT REMOVAL Right     HX GI      colonosopy    HX GYN      bartholin cyst    HX HEART CATHETERIZATION  2005    HX HEENT Bilateral     macular puckering    HX HEENT      sinus surgery    HX KNEE ARTHROSCOPY Right     HX ORTHOPAEDIC      cortisone shots to both knees    HX OTHER SURGICAL      shoulder surgery    HX OTHER SURGICAL      eye surgery    HX OTHER SURGICAL  many years ago    sinus surgery    HX OTHER SURGICAL      knee surgery    HX OTHER SURGICAL  10-22 & 10-29    Epidural steroid injection    HX SHOULDER ARTHROSCOPY Right            Social History     Tobacco Use    Smoking status: Never Smoker    Smokeless tobacco: Never Used   Substance Use Topics    Alcohol use: No     Frequency: Never    Drug use: No     Family History   Problem Relation Age of Onset    Coronary Artery Disease Mother     Anemia Neg Hx     Arrhythmia Neg Hx     Asthma Neg Hx     Clotting Disorder Neg Hx     Diabetes Neg Hx     Fainting Neg Hx     Heart Attack Neg Hx     Heart Surgery Neg Hx     High Cholesterol Neg Hx     Hypertension Neg Hx     Pacemaker Neg Hx     Sudden Death Neg Hx      No current facility-administered medications on file prior to encounter. Current Outpatient Medications on File Prior to Encounter   Medication Sig Dispense Refill    multivitamin, tx-iron-ca-min (THERA-M W/ IRON) 9 mg iron-400 mcg tab tablet Take 1 Tab by mouth daily.       Omega-3 Fatty Acids (FISH OIL) 500 mg cap Take  by mouth daily.      nitrofurantoin (MACRODANTIN) 50 mg capsule Take 50 mg by mouth every six (6) hours.  ferrous sulfate (IRON) 325 mg (65 mg iron) tablet Take  by mouth Daily (before breakfast).  acetaminophen (TYLENOL) 325 mg tablet Take  by mouth every four (4) hours as needed for Pain.  sitaGLIPtin (JANUVIA) 25 mg tablet Take 25 mg by mouth daily.  levothyroxine (SYNTHROID) 25 mcg tablet Take  by mouth nightly.  Calcium Carbonate-Vit D3-Min (CALCIUM-VITAMIN D) 600-400 mg-unit Tab Take 1 Tab by mouth two (2) times a day.  pravastatin (PRAVACHOL) 40 mg tablet Take 40 mg by mouth nightly.  esomeprazole (NEXIUM) 40 mg capsule Take 40 mg by mouth daily.  telmisartan-hydroCHLOROthiazide (MICARDIS HCT) 40-12.5 mg per tablet Take 1 Tab by mouth daily.  aspirin delayed-release 81 mg tablet Take 81 mg by mouth daily. Allergies   Allergen Reactions    Latex, Natural Rubber Not Reported This Time    Aspirin Nausea Only    Iodinated Contrast- Oral And Iv Dye Other (comments)     Reactions: splotches on skin    As per pt and spouse , pt is only allergic to red dye.  Pt and spouse were questioned many times and denied allergies  Say that pt did okay with contrast dye last cath done by Dr Ramin Ornelas Other Medication Itching     All \"mycins\"    Pcn [Penicillins] Other (comments)     Reactions: splotches on skin    Sting, Bee Swelling           Review of Systems  Constitutional: No fever, chills, diaphoresis, malaise, fatigue or weight gain/loss or falls  Skin: no itching or rashes  HEENT: no ear discomfort, hearing loss, tinnitus, epistaxis or sore throat  EYES: no blurry vision, double vision or photophobia  CARDIOVASCULAR: no claudication, cp, palpitations, orthopnea, pnd or LE edema  PULMONARY: no cough, wheeze, shortness of breath or sputum production  GI: no nausea, vomiting, diarrhea, abdominal pain, melena, hematemesis or brbpr  : no dysuria, hematuria  MUSCULOSKELETAL: no back pain, joint pain or myalgias  ENDOCRINE: no heat/cold intolerance, polyuria or polydipsia  HEME: no easy bruising or bleeding  NEURO: no unilateral weakness, numbness, tingling or seizures      Physical Exam:      Visit Vitals  /50   Pulse 60   Temp 97.7 °F (36.5 °C)   Resp 16   Ht 4' 8\" (1.422 m)   Wt 50 kg (110 lb 4 oz)   SpO2 100%   BMI 24.72 kg/m²     Body mass index is 24.72 kg/m². Physical Exam:  GEN: well nourished, laying in bed in no acute distress  HEENT: atraumatic, nose normal,oropharynx clear, MMM  NECK: supple, trachea midline, no supraclavicular or submandibular adenopathy noted  EYES: conjuctiva normal, lids with out lesions, PERRL  HEART: RRR with out m/r/g, pmi nondisplaced, pulses 2+ distally  LUNGS: equal chest wall expansion, cta bl with out wheezes/rales or rhonchi  AB: soft, +BS, nt/nd no organomegaly  EXT:  No edema.          Laboratory Studies:    Recent Results (from the past 24 hour(s))   GLUCOSE, POC    Collection Time: 01/22/19  7:24 AM   Result Value Ref Range    Glucose (POC) 126 (H) 70 - 110 mg/dL   HEMOGLOBIN A1C W/O EAG    Collection Time: 01/22/19  7:44 AM   Result Value Ref Range    Hemoglobin A1c 6.2 (H) 4.2 - 5.6 %   GLUCOSE, POC    Collection Time: 01/22/19 11:06 AM   Result Value Ref Range    Glucose (POC) 150 (H) 70 - 110 mg/dL

## 2019-01-22 NOTE — PROGRESS NOTES
1401- Patient arrives to unit at this time. Patient does not speak much [place]. Dual skin assessment completed with Denise Navas RN, no abnormalities noted besides surgical incision to back. Admission assessment completed. Patient is A/O x 4. Lungs clear, radial pulses present , pedal pulses present , abdomen soft and non-distended. Bowel sounds active, 20 G IV to right wrist  intact and patent infusing. No signs of phlebitis or infiltration noted. TEDs and SCDs applied bilaterally. Skin warm and dry  with  mepilex dressing to lower back CDI. Hemovac drain in place, patent, and draining serosanguinous fluid. Patient denies numbness/tingling. Pain 0/10, patient sleeping. Patient was oriented to the room to include use of call bell, meal ordering, and use of incentive spirometer. Patient was given explanation of \" up for dinner\" program and has verbalized understanding. Phone and call bell left within reach. Plan of care for the day addressed with patient. Educated on pain medication availability and possible side effects as well as need to call for assistance before getting out of bed, patient verbalized understanding. 1810- Patient complained of nausea, PRN Zofran 4 mg IV given at this time as ordered for nausea, will continue to monitor for effectiveness.

## 2019-01-23 VITALS
DIASTOLIC BLOOD PRESSURE: 62 MMHG | RESPIRATION RATE: 16 BRPM | HEART RATE: 62 BPM | WEIGHT: 110.25 LBS | SYSTOLIC BLOOD PRESSURE: 134 MMHG | OXYGEN SATURATION: 96 % | BODY MASS INDEX: 24.8 KG/M2 | HEIGHT: 56 IN | TEMPERATURE: 98.9 F

## 2019-01-23 LAB
ANION GAP SERPL CALC-SCNC: 9 MMOL/L (ref 3–18)
BUN SERPL-MCNC: 30 MG/DL (ref 7–18)
BUN/CREAT SERPL: 29 (ref 12–20)
CALCIUM SERPL-MCNC: 8.1 MG/DL (ref 8.5–10.1)
CHLORIDE SERPL-SCNC: 105 MMOL/L (ref 100–108)
CO2 SERPL-SCNC: 25 MMOL/L (ref 21–32)
CREAT SERPL-MCNC: 1.05 MG/DL (ref 0.6–1.3)
ERYTHROCYTE [DISTWIDTH] IN BLOOD BY AUTOMATED COUNT: 14.1 % (ref 11.6–14.5)
GLUCOSE BLD STRIP.AUTO-MCNC: 118 MG/DL (ref 70–110)
GLUCOSE BLD STRIP.AUTO-MCNC: 118 MG/DL (ref 70–110)
GLUCOSE BLD STRIP.AUTO-MCNC: 89 MG/DL (ref 70–110)
GLUCOSE SERPL-MCNC: 102 MG/DL (ref 74–99)
HCT VFR BLD AUTO: 33 % (ref 35–45)
HGB BLD-MCNC: 10.9 G/DL (ref 12–16)
MCH RBC QN AUTO: 29.3 PG (ref 24–34)
MCHC RBC AUTO-ENTMCNC: 33 G/DL (ref 31–37)
MCV RBC AUTO: 88.7 FL (ref 74–97)
PLATELET # BLD AUTO: 200 K/UL (ref 135–420)
PMV BLD AUTO: 9.8 FL (ref 9.2–11.8)
POTASSIUM SERPL-SCNC: 4.2 MMOL/L (ref 3.5–5.5)
RBC # BLD AUTO: 3.72 M/UL (ref 4.2–5.3)
SODIUM SERPL-SCNC: 139 MMOL/L (ref 136–145)
WBC # BLD AUTO: 9.4 K/UL (ref 4.6–13.2)

## 2019-01-23 PROCEDURE — 97167 OT EVAL HIGH COMPLEX 60 MIN: CPT

## 2019-01-23 PROCEDURE — 97535 SELF CARE MNGMENT TRAINING: CPT

## 2019-01-23 PROCEDURE — 74011250637 HC RX REV CODE- 250/637: Performed by: PHYSICIAN ASSISTANT

## 2019-01-23 PROCEDURE — 74011250636 HC RX REV CODE- 250/636: Performed by: PHYSICIAN ASSISTANT

## 2019-01-23 PROCEDURE — 97530 THERAPEUTIC ACTIVITIES: CPT

## 2019-01-23 PROCEDURE — 74011250637 HC RX REV CODE- 250/637: Performed by: ORTHOPAEDIC SURGERY

## 2019-01-23 PROCEDURE — 77010033678 HC OXYGEN DAILY

## 2019-01-23 PROCEDURE — 85027 COMPLETE CBC AUTOMATED: CPT

## 2019-01-23 PROCEDURE — 82962 GLUCOSE BLOOD TEST: CPT

## 2019-01-23 PROCEDURE — 36415 COLL VENOUS BLD VENIPUNCTURE: CPT

## 2019-01-23 PROCEDURE — 97116 GAIT TRAINING THERAPY: CPT

## 2019-01-23 PROCEDURE — 77030011256 HC DRSG MEPILEX <16IN NO BORD MOLN -A

## 2019-01-23 PROCEDURE — 99218 HC RM OBSERVATION: CPT

## 2019-01-23 PROCEDURE — 80048 BASIC METABOLIC PNL TOTAL CA: CPT

## 2019-01-23 RX ORDER — OXYCODONE AND ACETAMINOPHEN 5; 325 MG/1; MG/1
1-2 TABLET ORAL
Qty: 40 TAB | Refills: 0 | Status: SHIPPED | OUTPATIENT
Start: 2019-01-23 | End: 2021-09-09

## 2019-01-23 RX ORDER — CYCLOBENZAPRINE HCL 10 MG
5 TABLET ORAL
Qty: 40 TAB | Refills: 1 | Status: SHIPPED | OUTPATIENT
Start: 2019-01-23 | End: 2021-09-09

## 2019-01-23 RX ORDER — FLUCONAZOLE 150 MG/1
150 TABLET ORAL DAILY
Qty: 2 TAB | Refills: 0 | Status: SHIPPED | OUTPATIENT
Start: 2019-01-23 | End: 2019-01-24

## 2019-01-23 RX ADMIN — FERROUS SULFATE TAB 325 MG (65 MG ELEMENTAL FE) 325 MG: 325 (65 FE) TAB at 09:21

## 2019-01-23 RX ADMIN — FAMOTIDINE 20 MG: 20 TABLET ORAL at 09:20

## 2019-01-23 RX ADMIN — NITROFURANTOIN MACROCRYSTALS 50 MG: 50 CAPSULE ORAL at 09:19

## 2019-01-23 RX ADMIN — NITROFURANTOIN MACROCRYSTALS 50 MG: 50 CAPSULE ORAL at 03:58

## 2019-01-23 RX ADMIN — PANTOPRAZOLE SODIUM 40 MG: 40 TABLET, DELAYED RELEASE ORAL at 09:21

## 2019-01-23 RX ADMIN — HYDROCHLOROTHIAZIDE 12.5 MG: 12.5 CAPSULE ORAL at 09:21

## 2019-01-23 RX ADMIN — VANCOMYCIN HYDROCHLORIDE 1000 MG: 10 INJECTION, POWDER, LYOPHILIZED, FOR SOLUTION INTRAVENOUS at 09:19

## 2019-01-23 RX ADMIN — TELMISARTAN 40 MG: 40 TABLET ORAL at 09:20

## 2019-01-23 RX ADMIN — OXYCODONE AND ACETAMINOPHEN 1 TABLET: 5; 325 TABLET ORAL at 11:09

## 2019-01-23 NOTE — PROGRESS NOTES
Problem: Pressure Injury - Risk of  Goal: *Prevention of pressure injury  Document Marc Scale and appropriate interventions in the flowsheet.   Outcome: Progressing Towards Goal  Pressure Injury Interventions:       Moisture Interventions: Check for incontinence Q2 hours and as needed    Activity Interventions: Increase time out of bed, Pressure redistribution bed/mattress(bed type)    Mobility Interventions: Pressure redistribution bed/mattress (bed type)    Nutrition Interventions: Document food/fluid/supplement intake

## 2019-01-23 NOTE — PROGRESS NOTES
Problem: Self Care Deficits Care Plan (Adult)  Goal: *Acute Goals and Plan of Care (Insert Text)  Initial Occupational Therapy Goals (1/23/2019) Within 7 day(s):    1. Patient will perform toilet transfer with Supervision in preparation for bowel and bladder management. 2. Patient will perform bowel and bladder management with setup/Supervision for increased independence with ADLs. 3. Patient will perform UB dressing with setup (including back brace) for increased independence with ADLs. 4. Patient will perform LB dressing with setup/Delores & A/E PRN for increased independence with ADLs. 5. Patient will adhere to back/spinal precautions 100% of the time with 1-2 verbal cues for increased independence with ADLs. 6. Patient will utilize energy conservation techniques with 1 verbal cue(s) for increased independence with ADLs. Outcome: Resolved/Met Date Met: 01/23/19  Occupational Therapy EVALUATION/discharge    Patient: Robbie Donovan (11 y.o. female)  Date: 1/23/2019  Primary Diagnosis: LUMBAR SPONDYLOSIS WITH RADICULOPATHY AND LUMBAR STENOSIS AND LUMBOSACRAL STENOSIS  Lumbar spinal stenosis  Procedure(s) (LRB):  RIGHT LUMBAR 4- SACRAL 1 HEMILAMINECTOMY AND DISCTECTOMY WITH C-ARM, \"SPEC POP\" (Right) 1 Day Post-Op   Precautions:  Fall, Back, Spinal(LSO when OOB)    ASSESSMENT AND RECOMMENDATIONS:  Based on the objective data described below, the patient presents with ability to perform ADL tasks at baseline level. Patient eager to perform things on own, despite being unsafe and limited in cognition. Pt highly distractible as pt having shirt half on and then attempting to self-ambulate in room to pack sheets & blankets in her luggage to leave. Pt limited in LE ADLs and attempted to assist w/ pt wanting to perform on own. Pt instructed to dress LLE first as RLE w/ increased ROM, but poor follow through. Pt requiring constant cues for memory loss and safety. Pt w/o adequate cognition for A/E instruction.  Attempted DME suggestions w/ spouse reporting he would assist and pt not needing anything. Pt will need constant Supervision and assist. Verified that level of care not too much for elderly spouse w/ spouse reporting he is able to assist w/ her care. Pt with no further OT/ADL concerns at this time. Recommend HHOT to assist in decreasing caregiver burden and increase safety. Education: Reviewed Neck/Back Precautions, Collar/Brace management, body mechanics, home safety, adaptive strategies and adaptive dressing techniques including clothing modifications with patient  verbalizing understanding at this time. Pt/spouse decline need for ; spouse translating in [place]    Discharge Recommendations: Home Health  Further Equipment Recommendations for Discharge: To Be Determined (TBD) at next level of care (likely needs bath board)      SUBJECTIVE:   Patient stated I cough and I soaked.  (increased IV fluids worsening pt's incontinence)    OBJECTIVE DATA SUMMARY:     Past Medical History:   Diagnosis Date    Adverse effect of anesthesia     very, very sleepy with Anesthesia    Adverse effect of anesthesia     decreased heartrate with colonoscopy    Anemia     Arthritis     osteoarthritis    CAD (coronary artery disease)     Depression     Diabetes ([facility] [address]     adult onset    Difficult intubation     went bradycardc with symptoms, had diificuly waking up for 24 hours    Foot fracture 2008    right    GERD (gastroesophageal reflux disease)     Heart attack ([facility] [address] 2005    Hypercholesterolemia     Hyperlipidemia     Hypertension     Ill-defined condition 10/2018    reddened itchy bumps on lower abdomen after steroid injections in October, itchy patient states, very red, slightly opened with yellow looking head on them.     Ill-defined condition     history of frequent uti's, especially with stress and pressure on the stomach    Ill-defined condition     history of post nasal drip    Ill-defined condition history of frequent small BM's at night    Ill-defined condition     recurren bladder infection    Menopause     Mitral regurgitation     Myocardial infarct (HCC)     Nausea & vomiting     severe nausea    Sick sinus syndrome (HCC)     Thyroid disease      Past Surgical History:   Procedure Laterality Date    HX CATARACT REMOVAL Right     HX GI      colonosopy    HX GYN      bartholin cyst    HX HEART CATHETERIZATION  2005    HX HEENT Bilateral     macular puckering    HX HEENT      sinus surgery    HX KNEE ARTHROSCOPY Right     HX ORTHOPAEDIC      cortisone shots to both knees    HX OTHER SURGICAL      shoulder surgery    HX OTHER SURGICAL      eye surgery    HX OTHER SURGICAL  many years ago    sinus surgery    HX OTHER SURGICAL      knee surgery    HX OTHER SURGICAL  10-22 & 10-29    Epidural steroid injection    HX SHOULDER ARTHROSCOPY Right           Barriers to Learning/Limitations: yes;  language, cultural , cognitive and physical  Compensate with: visual, verbal, tactile, kinesthetic cues/model    Prior Level of Function/Home Situation: Pt w/ supportive spouse; pt is [place]  Home Situation  Home Environment: Private residence  # Steps to Enter: 2  Rails to Enter: Yes  Hand Rails : Bilateral  One/Two Story Residence: One story  Living Alone: No  Support Systems: Spouse/Significant Other/Partner  Patient Expects to be Discharged to[de-identified] Private residence  Current DME Used/Available at Home: None  Tub or Shower Type: Tub/Shower combination  [x]     Right hand dominant   []     Left hand dominant    Cognitive/Behavioral Status:  Neurologic State: Alert;Confused; Restless  Orientation Level: Oriented X4  Cognition: Decreased attention/concentration;Decreased command following; Impaired decision making;Memory loss;Poor safety awareness  Safety/Judgement: Awareness of environment;Decreased awareness of need for assistance;Decreased awareness of need for safety;Decreased insight into deficits    Skin: R lumbar incision w/ Mepilex   Edema: compression hose in place & applied ice     Vision/Perceptual:     pt asking spouse for underwear, confused w/ folded sheet (appears d/t cognition)     Coordination: BUE  Coordination: Within functional limits  Fine Motor Skills-Upper: Left Intact; Right Intact    Gross Motor Skills-Upper: Left Intact; Right Intact    Balance:  Sitting: Intact  Standing: Intact; With support    Strength: BUE  Strength: Generally decreased, functional  Tone & Sensation: BUE  Tone: Normal  Sensation: Intact  Range of Motion: BUE  AROM: Generally decreased, functional  PROM: Generally decreased, functional    Functional Mobility and Transfers for ADLs:  Bed Mobility:  Rolling: Contact guard assistance  Supine to Sit: Stand-by assistance  Sit to Supine: Contact guard assistance;Stand-by assistance  Scooting: Stand-by assistance  Transfers:  Sit to Stand: Contact guard assistance  Bed to Chair: Contact guard assistance   Toilet Transfer : Contact guard assistance   Bathroom Mobility: Contact guard assistance    ADL Assessment:  Feeding: Setup    Oral Facial Hygiene/Grooming: Contact guard assistance    Bathing: Contact guard assistance;Minimum assistance; Additional time; Adaptive equipment    Upper Body Dressing: Contact guard assistance    Lower Body Dressing: Contact guard assistance;Minimum assistance; Additional time    Toileting: Contact guard assistance    ADL Intervention:  Feeding  Feeding Assistance: Supervision/set-up    Grooming  Washing Hands: Contact guard assistance    Upper Body Dressing Assistance  Bra: Supervision/set-up  Orthotics(Brace): Minimum assistance;Proximal stability; Compensatory technique training  Pullover Shirt: Supervision/set-up    Lower Body Dressing Assistance  Underpants: Contact guard assistance  Protective Undergarmet: Moderate assistance  Pants With Elastic Waist: Minimum assistance  Socks: Minimum assistance; Moderate assistance; Compensatory technique training  Position Performed: Seated in chair    LE Adaptive Equipment:   [x] long handle sponge  [x] long handle shoe horn  was issued in order to maximize patient's independence for independent living/decrease caregiver burden/assist with co-morbidities affecting function and body mechanics. Spouse assists w/ compression hose at baseline. Pt w/o cognition for use of other A/E    Toileting  Bladder Hygiene: Contact guard assistance  Clothing Management: Minimum assistance  Cues: Visual cues provided;Verbal cues provided; Tactile cues provided;Physical assistance for pants down;Physical assistance for pants up    Cognitive Retraining  Orientation Retraining: Reorienting;Situation  Problem Solving: Inductive reason; Identifying the task; Identifying the problem;General alternative solution;Deductive reason; Awareness of environment  Executive Functions: Executing cognitive plans;Managing time;Regulating behavior  Organizing/Sequencing: Breaking task down;Prioritizing  Attention to Task: Distractibility; Single task  Maintains Attention For (Time): 30 seconds(less than)  Following Commands: Awareness of environment  Safety/Judgement: Awareness of environment;Decreased awareness of need for assistance;Decreased awareness of need for safety;Decreased insight into deficits  Cues: Tactile cues provided;Verbal cues provided;Visual cues provided    Pain:  Pre-treatment: 5/10  Post-treatment: 5/10    Activity Tolerance:   Patient able to stand <1 minute(s). Patient able to complete ADLs with frequent rest breaks. Patient limited by pain/cognition/strength/ balance/safety. Patient unsteady     Please refer to the flowsheet for vital signs taken during this treatment.   After treatment:   [x]  Patient left in no apparent distress sitting up in chair  []  Patient left in no apparent distress in bed  [x]  Call bell left within reach  [x]  Nursing notified/Adelia  [x]  Caregiver present/spouse  []  Bed alarm activated    COMMUNICATION/EDUCATION:   Communication/Collaboration:  [x]      Home safety education was provided and the patient/caregiver indicated understanding. [x]      Patient/family have participated as able and agree with findings and recommendations. []      Patient is unable to participate in plan of care at this time. Thank you for this referral.  Nicky Smyth, OTR/L  Time Calculation: 39 mins    G-Codes (GP)  Self Care   Current  CK= 40-59%   Goal  CJ= 20-39%   D/C  CJ= 20-39%  The severity rating is based on the professional judgement & direct observation of Level of Assistance required for Functional Mobility and ADLs. Eval Complexity: History: HIGH Complexity : Extensive review of history including physical, cognitive and psychosocial history ; Examination: HIGH Complexity : 5 or more performance deficits relating to physical, cognitive , or psychosocial skils that result in activity limitations and / or participation restrictions; Decision Making:HIGH Complexity : Patient presents with comorbidities that affect occupational performance.  Signifigant modification of tasks or assistance (eg, physical or verbal) with assessment (s) is necessary to enable patient to complete evaluation

## 2019-01-23 NOTE — ROUTINE PROCESS
Bedside and Verbal shift change report given to Adelaide Linder RN by Devan Davis RN.  Report included the following information SBAR, Kardex, OR Summary, Intake/Output and MAR

## 2019-01-23 NOTE — PROGRESS NOTES
1536 Assumed care of patient from Addie Johnson RN. Shift assessment initiated. Pain voiced at 0/10, denies the need for pain medication at this time. IS at bedside and encouraged. Complaints of stress incontience at this time, brief in place. Bladder scan shows no/minimal residual after voiding All questions and concerns answered. All needs met. Bed in the lowest position. Call bell/phone within reach. No signs of distress noted. Will monitor for changes. Spouse at bedside. 6150 Scheduled medications given. Brief changed due to stress incontinence. Drain removed per orders. Pain voiced at 5/10, refused Percocet at this time. Will call after PT session. 1400 AVS reviewed with Angelika Nava, RN. I have reviewed discharge instructions with the patient/spouse. The patient/spouse verbalized understanding.

## 2019-01-23 NOTE — PROGRESS NOTES
2056 -  Head to toe assessment performed at this time. Pt denies any chest pain or SOB. Pt denies any numbness or tingling to extremities. Pt encouraged to manage pain and to use the incentive spirometer. Pt educated on the side effects of medications taken. Pt left with call light within reach and bed in low position. Will continue to monitor. 2135 - Physician paged at this time in reference to having a urinalysis completed. Pt is complaining of having a burning sensation before and while urinating. Dr. Rudie Sandhoff on call. 1 - Dr. Rudie Sandhoff ordered urinalysis for pt.     2245 - Urine sample collected at this time for urinalysis. 0100 - Pt in bed sleeping with no signs of distress. Will continue to monitor. 2630 - CHG wipes completed at this time. Pt received incontinence care. Pt left in bed with no signs of distress. Shift summary - Pt pain managed with medication per MAR. Pt informed about all medications and side effects and encouraged to ask questions about medication. Pt encouraged throughout the night to use incentive spirometer and the purpose of the incentive spirometer. Pt left with no signs of distress and any concerns of pt addressed.

## 2019-01-23 NOTE — DISCHARGE INSTRUCTIONS
[facility]  Dr. Ward Stevan Post-Operative Instructions Lumbar Fusion    *YOU MUST AVOID SMOKING OR BEING AROUND ANYONE WHO SMOKES. AVOID ALL PRODUCTS THAT CONTAIN NICOTINE. DO NOT TAKE IBUPROFEN OR ANTI--INFLAMMATORIES, AS THESE MAY ALTER THE HEALING OF THE FUSION. ACTIVITIES :  *The first week after surgery   1. You may be up and walking about the house. 2.  Activities around the house, such as washing dishes, fixing light meals, and your own personal care are fine. 3.  Avoid strenuous activities, such as vacuuming, lifting laundry or grocery bags. 4.  Do not lift anything heavier than 1 gallon of milk (or about 5-8 pounds). 5.  Do not bend over to  items from the ground level until 3 months post-op. *Week 2 and beyond  1. You may gradually increase your activities, but still avoid heavy lifting, pushing/pulling. 2.  Walking is the best way to rebuild strength and stamina. Start SLOWLY and gradually increase the distance a little every week. 3.  Walk at a pace that avoids fatigue or severe pain. Do not try to walk several blocks the first day! As you increase the distance, you may feel tired. If so, stop and rest.   4.  You should be able to walk several blocks by your first clinic visit. 5.  Follow-up with Dr. Arlette Dominguez in 10-14 days. BATHING and INCISION CARE:  1. The incision may be tender to touch or feel numb: this is normal.   2.  Keep the incision clean and dry no showering until your follow-up appointment. The incision will be closed with sutures under the skin and the skin will be glued. 3.  Do not apply any lotions, ointments or oils on the incision. 4.  If you notice any excessive swelling, redness, or persistent drainage around the incision, notify the office immediately. DRIVIN. You should not drive until after your follow-up appointment.    2.  You can be in a vehicle for short distances, but if you travel any long distance, please stop about every 30 minutes and walk/stretch. 3.  You should NEVER drive while taking narcotic medication. RETURN TO WORK :  1. The decision to return to work will be determined on an individual basis. 2.  Many people who have a strenuous job (construction, heavy labor, etc) may need to be off work for up to 12 weeks. 3.  If you need a work note, please let us know as soon as possible, and not the same day you are planning to return to work. NUTRITION :  1.  Good nutrition is an essential part of healing. 2.  You should eat a balanced diet each day, including fruits, vegetables, dairy products and protein. 3.  Remember to drink plenty of water. 4.  If you have not had a bowel movement within 3 days of surgery, you will need to use a laxative or suppository that can be obtained over-the-counter at your local pharmacy. BONE STIMULATOR:  1. A spinal bone stimulator may be prescribed for you under certain situations. 2.  A nurse will call you if one has been requested and discuss its use for approximately 4-6 months post-op every day. 3.  This device assists in bone healing and fusion. MEDICATIONS -  1. You may resume the medications you were taking before surgery, with the general exception of (or DO NOT TAKE) non-steroidal anti-inflammatory medications, such as Motrin, Aleve, Advil Naprosyn, Ibuprofen or aspirin. 2.  You will receive a prescription for pain medication at discharge from the hospital. The pain medication works best if taken before the pain becomes severe. 3.  To reduce stomach upset, always take the medication with food. 4.  Begin to wean yourself off the pain medication during the second week after discharge. 5.  If you need a refill, please call the office during working hours at least 2 days before your prescription runs out. Do not wait until your bottle is empty to call for a refill. 6.  DO NOT drive if you are taking narcotic pain medications.     HOME HEALTH CARE:  1.   A home health care service has been set-up for you to help assist you once you leave the hospital.  2.  They will contact you either before you leave the hospital or within 24 hours once you have been discharged home. 3. A nurse will assist you with your dressing changes and a Physical Therapist with help you with your therapy needs. CALL THE OFFICE:   If you have severe pain unrelieved by the medications, new numbness or tingling in your legs;   If you have a fever of 101.0°F or greater;    If you notice excessive swelling, redness, or persistent drainage from the incision or IV site; [facility] office number is [phone] from 8:00am to 5:00pm Monday through Friday. After 5:00pm, on weekends, or holidays, please leave a message with our answering service and the doctor on-call will get back to you shortly.

## 2019-01-23 NOTE — PROGRESS NOTES
Problem: Falls - Risk of  Goal: *Absence of Falls  Document Juan Fall Risk and appropriate interventions in the flowsheet.   Outcome: Progressing Towards Goal  Fall Risk Interventions:  Mobility Interventions: Utilize walker, cane, or other assistive device         Medication Interventions: Teach patient to arise slowly, Patient to call before getting OOB    Elimination Interventions: Call light in reach, Urinal in reach, Patient to call for help with toileting needs

## 2019-01-23 NOTE — PROGRESS NOTES
Occupational Therapy Evaluation/Treatment Attempt    Chart reviewed. Attempted Occupational Therapy Evaluation/Treatment, however, patient unable to be seen due to:  []  Nausea/vomiting  []  Eating  []  Pain  []  Patient too lethargic  []  Off Unit for testing/procedure  []  Dialysis treatment in progress   []  Telemetry Results  [x]  Other: Patient still running ABX and spouse requesting diaper change from nursing. Will f/u later as patient's schedule allows.    Thank you for this referral.  Nicky Smyth, OTR/L

## 2019-01-23 NOTE — PROGRESS NOTES
Transition of Care (LEATHA) Plan:     Pt will return back home with spouse, prearranged with [facility] [phone],RF accepted [address] completed, home walker at bedside,pt cleared by PT, spouse at bedside and will provide transportation home. Care Management Interventions  PCP Verified by CM: Yes  Palliative Care Criteria Met (RRAT>21 & CHF Dx)?: No  Mode of Transport at Discharge: Self(spouse)  Transition of Care Consult (CM Consult): [address]: No  Reason Outside [place]: Patient already serviced by other home care/hosp[facility] agency([facility]  Discharge Durable Medical Equipment: No  Physical Therapy Consult: Yes  Occupational Therapy Consult: Yes  Speech Therapy Consult: No  Current Support Network: Lives with Spouse  Confirm Follow Up Transport: Family(spouse)  Plan discussed with Pt/Family/Caregiver: Yes  Freedom of Choice Offered: Yes  Discharge Location  Discharge Placement: Home with home health        [facility] Transportation:   How is patient being transported at discharge? Family/Friend      When? Once cleared by Therapy between 12-2pm     Is transport scheduled? N/A      Follow-up appointment and transportation:   PCP/Specialist?  See AVS for Appointment         Who is transporting to the follow-up appointment? Family/Friend      Is transport for follow up appointment scheduled? N/A    Communication plan (with patient/family): Who is being called? Patient or Next of Kin? Responsible party? Patient      What number(s) is to be used? See Facesheet      What service provider is calling for [facility] services? When are they calling? 24-48 hours following discharge    Readmission Risk?   (Green/Low; Yellow/Moderate; Red/High):  Jack Rigby

## 2019-01-23 NOTE — PROGRESS NOTES
Problem: Falls - Risk of  Goal: *Absence of Falls  Document Juan Fall Risk and appropriate interventions in the flowsheet.   Outcome: Progressing Towards Goal  Fall Risk Interventions:  Mobility Interventions: Utilize walker, cane, or other assistive device, Patient to call before getting OOB         Medication Interventions: Teach patient to arise slowly, Patient to call before getting OOB    Elimination Interventions: Call light in reach, Toileting schedule/hourly rounds, Patient to call for help with toileting needs

## 2019-01-23 NOTE — ROUTINE PROCESS
Bedside and Verbal shift change report given to CHACE Roberson RN (oncoming nurse) by HALLEY Barrientos RN (offgoing nurse). Report included the following information SBAR, Kardex, Intake/Output, MAR and Recent Results.

## 2019-01-23 NOTE — PROGRESS NOTES
Occupational Therapy Evaluation/Treatment Attempt    Chart reviewed. Attempted Occupational Therapy Evaluation/Treatment, however, patient unable to be seen due to:  []  Nausea/vomiting  []  Eating  [x]  Pain  []  Patient too lethargic  []  Off Unit for testing/procedure  []  Dialysis treatment in progress   []  Telemetry Results  [x]  Other: Pt just returned to bed s/p PT tx. Pt/spouse with multiple questions and relayed to RN/Adelia. Notified will return when ABX complete for dressing and instruction on adaptive strategies. Will f/u later as patient's schedule allows.    Thank you for this referral.  Nicky Smyth, OTR/L

## 2019-01-23 NOTE — PROGRESS NOTES
Progress Note      Patient: Luis Solis               Sex: female          DOA: 1/22/2019         YOB: 1932      Age:  80 y.o.        LOS:  LOS: 0 days     Procedure: Procedure(s):  RIGHT LUMBAR 4- SACRAL 1 HEMILAMINECTOMY AND DISCTECTOMY WITH C-ARM, \"SPEC POP\"   Bladder leakage unrelated to surgical procedure and secondary to stress incontinence and volume overload. Subjective:     Luis Wren is a 80 y.o. female who c/o stable, mild-to-moderate joint symptoms intermittently, reasonably well controlled by PRN meds      Objective:      Visit Vitals  /64   Pulse 73   Temp 98.5 °F (36.9 °C)   Resp 16   Ht 4' 8\" (1.422 m)   Wt 50 kg (110 lb 4 oz)   SpO2 98%   BMI 24.72 kg/m²       Physical Exam:  A&O x3  VSS  HF/GS/QUADS/EHL/TA 5/5 bilateral  Calves nontender      Dressing: C/D/I    Intake and Output:  Current Shift:  01/22 1901 - 01/23 0700  In: 496.2 [P.O.:100; I.V.:396.2]  Out: -   Last three shifts:  01/21 0701 - 01/22 1900  In: 1400 [P.O.:50; I.V.:1350]  Out: 70 [Drains:50]    Drain Output:    Lab/Data Reviewed:  Lab results reviewed. For significant abnormal values and values requiring intervention, see assessment and plan.     Medications Reviewed    Continued hospitalization is indicated due to therapy needs      Assessment/Plan     Principal Problem:    Lumbar spinal stenosis (1/15/2019)    Active Problems:    Hypertension ()      Diabetes (HCC) ()      Overview: adult onset      Lumbar spondylosis (1/15/2019)      Radiculopathy of leg (1/15/2019)        S/p Lumbar fusion doing well  Change dressing, D/c O2  OOB today with PT BID with walker and Brace  D/c drain when less than 50cc, D/c weiss when ambulatory  D/C PCA, D/C IVF  Possible D/C home today if patient clears PT    Home

## 2019-01-23 NOTE — DISCHARGE SUMMARY
Discharge Summary    Patient: Luis Solis               Sex: female          DOA: 1/22/2019         YOB: 1932      Age:  80 y.o.        LOS:  LOS: 0 days                Admit Date: 1/22/2019    Discharge Date: 1/23/2019    Admission Diagnoses: LUMBAR SPONDYLOSIS WITH RADICULOPATHY AND LUMBAR STENOSIS AND LUMBOSACRAL STENOSIS  Lumbar spinal stenosis    Discharge Diagnoses:    Problem List as of 1/23/2019 Date Reviewed: 1/23/2019          Codes Class Noted - Resolved    * (Principal) Lumbar spinal stenosis ICD-10-CM: M48.061  ICD-9-CM: 724.02  1/15/2019 - Present        Lumbar spondylosis ICD-10-CM: M47.816  ICD-9-CM: 721.3  1/15/2019 - Present        Radiculopathy of leg ICD-10-CM: M54.10  ICD-9-CM: 724.4  1/15/2019 - Present        Aortic valve disorders ICD-10-CM: I35.9  ICD-9-CM: 424.1  12/17/2013 - Present        Chest pain, unspecified ICD-10-CM: R07.9  ICD-9-CM: 786.50  10/10/2013 - Present        Back pain ICD-10-CM: M54.9  ICD-9-CM: 724.5  10/10/2013 - Present        Dizziness ICD-10-CM: R42  ICD-9-CM: 780.4  6/13/2013 - Present        H/O myocardial infarction, greater than 8 weeks ICD-10-CM: I25.2  ICD-9-CM: 712  6/13/2013 - Present        Hypertension ICD-10-CM: I10  ICD-9-CM: 401.9  Unknown - Present        Diabetes ([facility] [address] ICD-10-CM: E11.9  ICD-9-CM: 250.00  Unknown - Present    Overview Signed 1/3/2011  7:25 PM by Linda Duron     adult onset             Fatigue ICD-10-CM: R53.83  ICD-9-CM: 780.79  Unknown - Present        Hyperlipidemia ICD-10-CM: E78.5  ICD-9-CM: 272.4  Unknown - Present        RESOLVED: Palpitations ICD-10-CM: R00.2  ICD-9-CM: 785.1  Unknown - 6/13/2013              Discharge Condition:  stable    Hospital Course: The patient was transferred to the floor for post-operative and medical care. She   was alert and oriented times 3. She was neurologically intact in the lower extremities, and calves are non-tender.   She was c/o of stable, mild-to-moderate joint symptoms intermittently, reasonably well controlled by current meds . She will begin Physical therapy and will be up and ambulatory with their walker. Their Hemoglobin was stable. Their pain medications were continued for pain control. Later today, if She is thought to be medically and orthopaedically stable then they will be discharged. Consults: None    Significant Diagnostic Studies: none    Discharge Medications:     Current Discharge Medication List      START taking these medications    Details   cyclobenzaprine (FLEXERIL) 10 mg tablet Take 0.5 Tabs by mouth three (3) times daily as needed for Muscle Spasm(s). Qty: 40 Tab, Refills: 1      oxyCODONE-acetaminophen (PERCOCET) 5-325 mg per tablet Take 1-2 Tabs by mouth every four (4) hours as needed. Max Daily Amount: 12 Tabs. Qty: 40 Tab, Refills: 0    Associated Diagnoses: Lumbar spondylosis      fluconazole (DIFLUCAN) 150 mg tablet Take 1 Tab by mouth daily for 1 day. FDA advises cautious prescribing of oral fluconazole in pregnancy. Qty: 2 Tab, Refills: 0         CONTINUE these medications which have NOT CHANGED    Details   cyanocobalamin (VITAMIN B-12) 1,000 mcg tablet Take 1,000 mcg by mouth daily. gentamicin-prednisoLONE (PRED-G) 0.3-1 % ophthalmic drops Administer 1 Drop to both eyes four (4) times daily. metoprolol succinate (TOPROL-XL) 25 mg XL tablet Take 25 mg by mouth nightly. psyllium (METAMUCIL) packet Take 1 Packet by mouth nightly. multivitamin, tx-iron-ca-min (THERA-M W/ IRON) 9 mg iron-400 mcg tab tablet Take 1 Tab by mouth daily. Omega-3 Fatty Acids (FISH OIL) 500 mg cap Take  by mouth daily. nitrofurantoin (MACRODANTIN) 50 mg capsule Take 50 mg by mouth every six (6) hours. ferrous sulfate (IRON) 325 mg (65 mg iron) tablet Take  by mouth Daily (before breakfast).       acetaminophen (TYLENOL) 325 mg tablet Take  by mouth every four (4) hours as needed for Pain.      sitaGLIPtin (JANUVIA) 25 mg tablet Take 25 mg by mouth daily. levothyroxine (SYNTHROID) 25 mcg tablet Take  by mouth nightly. Calcium Carbonate-Vit D3-Min (CALCIUM-VITAMIN D) 600-400 mg-unit Tab Take 1 Tab by mouth two (2) times a day. pravastatin (PRAVACHOL) 40 mg tablet Take 40 mg by mouth nightly. esomeprazole (NEXIUM) 40 mg capsule Take 40 mg by mouth daily. benzonatate (TESSALON) 100 mg capsule Take 100 mg by mouth three (3) times daily as needed for Cough. guaiFENesin-codeine (GUAIFENESIN AC) 100-10 mg/5 mL solution Take 5 mL by mouth every four (4) hours. hydrocortisone (ANUSOL-HC) 2.5 % rectal cream Insert  into rectum two (2) times a day. montelukast (SINGULAIR) 10 mg tablet Take 10 mg by mouth every evening. ondansetron hcl (ZOFRAN) 4 mg tablet Take 4 mg by mouth every eight (8) hours as needed for Nausea. albuterol (PROVENTIL HFA) 90 mcg/actuation inhaler Take 2 Puffs by inhalation every four (4) hours as needed for Wheezing. telmisartan-hydroCHLOROthiazide (MICARDIS HCT) 40-12.5 mg per tablet Take 1 Tab by mouth daily. STOP taking these medications       aspirin delayed-release 81 mg tablet Comments:   Reason for Stopping:               Activity: No lifting, driving or strenuous activity for 2 weeks     Diet:  Regular Diet    Wound Care: Keep wound clean and dry. Change dressing as needed. Follow-up: Pt should follow-up in the office in 10 days - 2 weeks. They should not get their incision wet until they see us back. The patient will have home health care since they are home bound after their recent surgery. They will have dressing changes and physical therapy. With physical therapy, they should be up and ambulatory weight bear as tolerated. They will be sent home on percocet and flexaril and Diflucan for post op anti fungal prevention. She will stop her Aspirin for one week post op.  They will also take all of the medications on their discharge medical reconciliation form.  They should avoid any lifting, anti-inflammatories or tobacco use. They will call with any and all concerns. Their medications are on the chart.

## 2019-01-24 ENCOUNTER — HOSPITAL ENCOUNTER (EMERGENCY)
Age: 84
Discharge: HOME | End: 2019-01-24
Attending: EMERGENCY MEDICINE
Payer: MEDICARE

## 2019-01-24 VITALS
HEART RATE: 60 BPM | BODY MASS INDEX: 24.52 KG/M2 | SYSTOLIC BLOOD PRESSURE: 174 MMHG | WEIGHT: 109 LBS | TEMPERATURE: 98.9 F | HEIGHT: 56 IN | DIASTOLIC BLOOD PRESSURE: 59 MMHG | RESPIRATION RATE: 16 BRPM

## 2019-01-24 DIAGNOSIS — Z91.14 NON COMPLIANCE W MEDICATION REGIMEN: ICD-10-CM

## 2019-01-24 DIAGNOSIS — G89.18 POST-OP PAIN: Primary | ICD-10-CM

## 2019-01-24 PROCEDURE — 97162 PT EVAL MOD COMPLEX 30 MIN: CPT

## 2019-01-24 PROCEDURE — 99284 EMERGENCY DEPT VISIT MOD MDM: CPT

## 2019-01-24 PROCEDURE — 74011250637 HC RX REV CODE- 250/637: Performed by: PHYSICIAN ASSISTANT

## 2019-01-24 PROCEDURE — 97116 GAIT TRAINING THERAPY: CPT

## 2019-01-24 RX ORDER — ONDANSETRON 4 MG/1
4 TABLET, ORALLY DISINTEGRATING ORAL
Status: COMPLETED | OUTPATIENT
Start: 2019-01-24 | End: 2019-01-24

## 2019-01-24 RX ORDER — CYCLOBENZAPRINE HCL 10 MG
5 TABLET ORAL
Status: COMPLETED | OUTPATIENT
Start: 2019-01-24 | End: 2019-01-24

## 2019-01-24 RX ORDER — OXYCODONE AND ACETAMINOPHEN 5; 325 MG/1; MG/1
1 TABLET ORAL
Status: COMPLETED | OUTPATIENT
Start: 2019-01-24 | End: 2019-01-24

## 2019-01-24 RX ADMIN — ONDANSETRON 4 MG: 4 TABLET, ORALLY DISINTEGRATING ORAL at 15:18

## 2019-01-24 RX ADMIN — ONDANSETRON 4 MG: 4 TABLET, ORALLY DISINTEGRATING ORAL at 19:15

## 2019-01-24 RX ADMIN — CYCLOBENZAPRINE HYDROCHLORIDE 5 MG: 10 TABLET, FILM COATED ORAL at 16:04

## 2019-01-24 RX ADMIN — OXYCODONE AND ACETAMINOPHEN 1 TABLET: 5; 325 TABLET ORAL at 19:15

## 2019-01-24 RX ADMIN — OXYCODONE AND ACETAMINOPHEN 1 TABLET: 5; 325 TABLET ORAL at 15:18

## 2019-01-24 NOTE — ED NOTES
Pt arrived via ambulance for bleeding to [facility] site. Pt had lumbar laminectomy surgery on 1/22/2019 here at [facility]. Medics also report pt/family reported vaginal bleeding this am. Awaiting arrival of . Pt has history of UTI, and HTN, unsure if she took meidcations this am,  Has not been taking meds lately due to nausea. Dsg to lower back intact, no active bleeding, old bleeding noted. Bruising noted to lower back. Pt was able to use the bedpan , 50 ml of clear yellow urine.

## 2019-01-24 NOTE — PROGRESS NOTES
Problem: Mobility Impaired (Adult and Pediatric) Goal: *Acute Goals and Plan of Care (Insert Text) Physical Therapy Goals Initiated 1/24/2019  to be met within 2-3 days STG's: 
1. Bed mobility:  Supine to/from sit with SBA/S in prep for ADL activity. 2. Transfers:  Sit to/from stand with SBA/S with RW/LSO applied in prep for gait. 3. Tolerate sitting up in chair >30min for functional activity performance. LTG's 1. Ambulation:  Ambulate 50 ft.with SBA/S with RW/LSO applied for home mobility at discharge. 2. Patient Education:  S/Independent with HEP for home performance accurately at discharge. 3. Step Negotiation: Ascend/Descend 2 steps with CGA/BHR's for home re-entry. Outcome: Progressing Towards Goal 
physical Therapy EVALUATION Patient: Alanna Cash (80 y.o. female) Date: 1/24/2019 Primary Diagnosis: No admission diagnoses are documented for this encounter. Precautions:Fall, Back(LSO) ASSESSMENT : 
Based on the objective data described below, the patient is an 81 yo F seen in ED. Pt spouse present and spouse reports pt did not receive pain medication on time. Note pt first language is [place] and spouse translating at this time for pt. Pt found lying on L side, appears in pain reporting 10/10 and requesting to use bathroom. Note serosanginous drainage from wound on sheet. Pt  presents with decreased independence in overall functional mobility with regard to bed mobility, transfers, gait quality, balance and activity tolerance, primarily limited by pain at this time. Pt required mod assist of 1-2 for bed mobility, and min/mod A for transfers with vc/tc and additional time for completion of all tasks. Pt  LSO donned by PT and pt completed GT/RW/min/mod A of 1-2-10ft to bathroom. Patient reports pain 10/10 pre and during GT, but decreased to 6-7/10 post session. Pt left back in L side lying with spouse present.   Will continue skilled PT intervention to address goals as noted below. Would recommend medical transport home if discharged today. Recommend HHPT for follow up physical therapy upon discharge to reach maximal level of independence/safety with functional mobility. PRICILLA Mayen made aware of above. Pt Education: pt educated in safety/technique during functional mobility tasks Patient will benefit from skilled intervention to address the above impairments. Patients rehabilitation potential is considered to be Fair Factors which may influence rehabilitation potential include:  
[]         None noted 
[]         Mental ability/status []         Medical condition 
[x]         Home/family situation and support systems 
[]         Safety awareness [x]         Pain tolerance/management 
[]         Other: PLAN : 
Recommendations and Planned Interventions: 
[x]           Bed Mobility Training             []    Neuromuscular Re-Education 
[x]           Transfer Training                   []    Orthotic/Prosthetic Training 
[x]           Gait Training                          []    Modalities [x]           Therapeutic Exercises          []    Edema Management/Control 
[x]           Therapeutic Activities            [x]    Patient and Family Training/Education 
[]           Other (comment): Frequency/Duration: Patient will be followed by physical therapy 1-2 times per day to address goals. Discharge Recommendations: [facility] Further Equipment Recommendations for Discharge: N/A  
 
SUBJECTIVE:  
Patient stated I have to go bathroom.  OBJECTIVE DATA SUMMARY:  
 
Past Medical History:  
Diagnosis Date  Adverse effect of anesthesia   
 very, very sleepy with Anesthesia  Adverse effect of anesthesia   
 decreased heartrate with colonoscopy  Anemia  Arthritis   
 osteoarthritis  CAD (coronary artery disease)  Depression  [facility] adult onset  Difficult intubation   
 went bradycardc with symptoms, had diificuly waking up for 24 hours  Foot fracture 2008  
 right  GERD (gastroesophageal reflux disease)  Heart attack ([address] 2005  Hypercholesterolemia  Hyperlipidemia  Hypertension  Ill-defined condition 10/2018  
 reddened itchy bumps on lower abdomen after steroid injections in October, itchy patient states, very red, slightly opened with yellow looking head on them.  Ill-defined condition   
 history of frequent uti's, especially with stress and pressure on the stomach  Ill-defined condition   
 history of post nasal drip  Ill-defined condition   
 history of frequent small BM's at night  Ill-defined condition   
 recurren bladder infection  Menopause  Mitral regurgitation  Myocardial infarct ([address]  Nausea & vomiting   
 severe nausea  Sick sinus syndrome ([address]  Thyroid disease Past Surgical History:  
Procedure Laterality Date  HX CATARACT REMOVAL Right  HX GI    
 colonosopy  HX GYN    
 bartholin cyst  
 HX HEART CATHETERIZATION  2005  HX HEENT Bilateral   
 macular puckering  HX HEENT    
 sinus surgery  HX KNEE ARTHROSCOPY Right  HX ORTHOPAEDIC    
 cortisone shots to both knees  HX OTHER SURGICAL    
 shoulder surgery  HX OTHER SURGICAL    
 eye surgery  HX OTHER SURGICAL  many years ago  
 sinus surgery  HX OTHER SURGICAL    
 knee surgery  HX OTHER SURGICAL  10-22 & 10-29 Epidural steroid injection  HX SHOULDER ARTHROSCOPY Right Barriers to Learning/Limitations: yes;  language and physical 
Compensate with: Visual Cues, Verbal Cues and Tactile Cues Prior Level of Function/Home Situation: per chart review, yesterday when seen by PTA Pancho Pearce, pt able to complete GT RW/LSO with SBA and performed bed mobility/transfers with same. Home Situation Home Environment: Private residence # Steps to Enter: 2 Rails to Enter:  Yes 
 Hand Rails : Bilateral 
One/Two Story Residence: One story Living Alone: No 
Support Systems: Spouse/Significant Other/Partner Patient Expects to be Discharged to[de-identified] Unknown Current DME Used/Available at Home: Brace/Splint, Walker, rollingCritical Behavior: 
Neurologic State: Alert; Appropriate for age Safety/Judgement: Awareness of environment Psychosocial 
Patient Behaviors: Anxious; Cooperative Family  Behaviors: Calm;SupportiveFamily  Behaviors: Calm;Supportive Strength:   
Strength: Generally decreased, functional 
Tone & Sensation:  
Tone: Normal 
Sensation: Intact(by observation) Range Of Motion: 
AROM: Generally decreased, functional 
Functional Mobility: 
Bed Mobility: 
Supine to Sit: Moderate assistance Sit to Supine: Moderate assistance;Assist x2 Transfers: 
Sit to Stand: Moderate assistance;Minimum assistance; Additional time;Assist x1-2 Stand to Sit: Moderate assistance;Minimum assistance; Additional time;Assist x1-2 Bed to Chair: Moderate assistance;Minimum assistance; Additional time;Assist x1-2 Balance:  
Sitting: Intact; With support Standing: Impaired; With support(RW) 
Standing - Static: Fair;Constant support Standing - Dynamic : Fair(/poor)Ambulation/Gait Training: 
Distance (ft): 20 Feet (ft)(10ft to/from bathroom) Assistive Device: Brace/Splint;Gait belt;Walker, rolling Ambulation - Level of Assistance: Moderate assistance;Minimal assistance;Assist x1-2; Additional time Gait Description (WDL): Exceptions to [facility] Gait Abnormalities: Antalgic;Decreased step clearance; Step to gait; Other(forward bent posture) Base of Support: Narrowed Speed/Yue: Slow;Shuffled Step Length: Right shortened;Left shortened Interventions: Safety awareness training; Tactile cues; Verbal cues; Visual/Demos Pain: 
Pain Scale 1: Numeric (0 - 10) Pain Intensity 1: 6(6-7/10) post; 10/10 pre Pain Location 1: Back Pain Orientation 1: Lower Pain Intervention(s) 1: Ambulation/Increased Activity(nurse already aware) Activity Tolerance:  
Fair-: limited by back pain Please refer to the flowsheet for vital signs taken during this treatment. After treatment:  
[]         Patient left in no apparent distress sitting up in chair 
[x]         Patient left in no apparent distress in bed in L side lying 
[]         Call bell left within reach [x]         Nursing notified 
[x]         Caregiver present 
[]         Bed alarm activated COMMUNICATION/EDUCATION:  
[x]         Fall prevention education was provided and the patient/caregiver indicated understanding. [x]         Patient/family have participated as able in goal setting and plan of care. [x]         Patient/family agree to work toward stated goals and plan of care. []         Patient understands intent and goals of therapy, but is neutral about his/her participation. []         Patient is unable to participate in goal setting and plan of care. Eval Complexity: History: HIGH Complexity :3+ comorbidities / personal factors will impact the outcome/ POC Exam:MEDIUM Complexity : 3 Standardized tests and measures addressing body structure, function, activity limitation and / or participation in recreation  Presentation: MEDIUM Complexity : Evolving with changing characteristics  Clinical Decision Making:Medium Complexity amb with RW/LSO/min/mod A of 2 > 30 ft with gt deviations as noted Overall Complexity:MEDIUM Thank you for this referral. 
Concepcion Simpson, PT Time Calculation: 35 mins

## 2019-01-24 NOTE — DISCHARGE INSTRUCTIONS
Acute Pain After Surgery: Care Instructions  Your Care Instructions    It's common to have some pain after surgery. Pain doesn't mean that something is wrong or that the surgery didn't go well. But when the pain is severe, it's important to work with your doctor to manage it. It's also important to be aware of a few facts about pain and pain medicine. · You are the only person who knows what your pain feels like. So be sure to tell your doctor when you are in pain or when the pain changes. Then he or she will know how to adjust your medicines. · Pain is often easier to control right after it starts. So it may be better to take regular doses of pain medicine and not wait until the pain gets bad. · Medicine can help control pain. But this doesn't mean you'll have no pain. Medicine works to keep the pain at a level you can live with. With time, you will feel better. Follow-up care is a key part of your treatment and safety. Be sure to make and go to all appointments, and call your doctor if you are having problems. It's also a good idea to know your test results and keep a list of the medicines you take. How can you care for yourself at home? · Be safe with medicines. Read and follow all instructions on the label. ? If the doctor gave you a prescription medicine for pain, take it as prescribed. ? If you are not taking a prescription pain medicine, ask your doctor if you can take an over-the-counter medicine. · If you take an over-the-counter pain medicine, such as acetaminophen (Tylenol), ibuprofen (Advil, Motrin), or naproxen (Aleve), read and follow all instructions on the label. · Do not take two or more pain medicines at the same time unless the doctor told you to. · Do not drink alcohol while you are taking pain medicines. · Try to walk each day if your doctor recommends it. Start by walking a little more than you did the day before. Bit by bit, increase the amount you walk.  Walking increases blood flow. It also helps prevent pneumonia and constipation. · To prevent constipation from opioid pain medicines:  ? Talk to your doctor about a laxative. ? Include fruits, vegetables, beans, and whole grains in your diet each day. These foods are high in fiber. ? Drink plenty of fluids, enough so that your urine is light yellow or clear like water. Drink water, fruit juice, or other drinks that do not contain caffeine or alcohol. If you have kidney, heart, or liver disease and have to limit fluids, talk with your doctor before you increase the amount of fluids you drink. ? Take a fiber supplement, such as Citrucel or Metamucil, every day if needed. Read and follow all instructions on the label. If you take pain medicine for more than a few days, talk to your doctor before you take fiber. When should you call for help? Call your doctor now or seek immediate medical care if:    · Your pain gets worse.     · Your pain is not controlled by medicine.    Watch closely for changes in your health, and be sure to contact your doctor if you have any problems. Where can you learn more? Go to http://kyle-jarett.info/. Enter ([phone] 071-931 in the search box to learn more about \"Acute Pain After Surgery: Care Instructions. \"  Current as of: September 23, 2018  Content Version: 11.9  © 9414-8818 [employer]. Care instructions adapted under license by [employer] (which disclaims liability or warranty for this information). If you have questions about a medical condition or this instruction, always ask your healthcare professional. [place] [address] any warranty or liability for your use of this information.

## 2019-01-24 NOTE — PROGRESS NOTES
Call received from BARBIE PLEITEZ to discuss d/c planning concerns, pt was recently discharged from [facility] yesterday after laminectomy procedure she was informed by patients spouse that he did not get her pain medications filled as recommended and due to patients pain pt refusing to get out of bed, cm did go visit pt at bedside, pt speaks [place] and appears to still be in pain cm informed that pt recently received percocet, spouse went home to get clothes and find patients RX to fill, at this time PA informed cm she does not have an admitting diagnosis, [facility] is active with [facility], pt does not meet criteria for rehab via medicare benefit will need 3 night hospital stay.

## 2019-01-24 NOTE — ED PROVIDER NOTES
EMERGENCY DEPARTMENT HISTORY AND PHYSICAL EXAM 
 
Date: 1/24/2019 Patient Name: Janice Johnson History of Presenting Illness Chief Complaint Patient presents with  Wound Check  Vaginal Bleeding History Provided By: Patient Chief Complaint: back pain and wound bleeding Duration: ~10-12 Hours Timing:  Worsening Location: lower back Quality: Aching Modifying factor: pain is increased with movement Associated Symptoms: denies any associated sxs Additional History (Context):  
 
2:37 PM 
 
Luis Griffith Ma is a 80 y.o. female with pertinent PMHx of HLD, anemia, hypercholesterolemia, arthritis, and DM presenting via EMS to the ED c/o post op wound bleeding and increased lower back pain x this morning. Pain is increased with movement. Pt had lumbar laminectomy yesterday via Abeba Aviles DO and was discharged yesterday. Pt's  states that they were discharged with pain medications (Percocet), but has not been taking it as the  has been unable to pick it up. Pt is taking 1000 mg of Tylenol 3xs per days. Pt's  notes vaginal spotting since this morning, but pt believes it is coming from drainage from her back. Pt has not fever/chills or N/V/D. 
 
PCP: Tapan Mccall MD 
Pt does not smoke tobacco, drink ETOH or do illicit drugs. There are no other complaints, changes, or physical findings at this time. Current Outpatient Medications Medication Sig Dispense Refill  cyanocobalamin (VITAMIN B-12) 1,000 mcg tablet Take 1,000 mcg by mouth daily.  montelukast (SINGULAIR) 10 mg tablet Take 10 mg by mouth every evening.  gentamicin-prednisoLONE (PRED-G) 0.3-1 % ophthalmic drops Administer 1 Drop to both eyes four (4) times daily.  albuterol (PROVENTIL HFA) 90 mcg/actuation inhaler Take 2 Puffs by inhalation every four (4) hours as needed for Wheezing.  metoprolol succinate (TOPROL-XL) 25 mg XL tablet Take 25 mg by mouth nightly.  psyllium (METAMUCIL) packet Take 1 Packet by mouth nightly.  multivitamin, tx-iron-ca-min (THERA-M W/ IRON) 9 mg iron-400 mcg tab tablet Take 1 Tab by mouth daily.  Omega-3 Fatty Acids (FISH OIL) 500 mg cap Take  by mouth daily.  nitrofurantoin (MACRODANTIN) 50 mg capsule Take 50 mg by mouth every six (6) hours.  ferrous sulfate (IRON) 325 mg (65 mg iron) tablet Take  by mouth Daily (before breakfast).  sitaGLIPtin (JANUVIA) 25 mg tablet Take 25 mg by mouth daily.  levothyroxine (SYNTHROID) 25 mcg tablet Take  by mouth nightly.  Calcium Carbonate-Vit D3-Min (CALCIUM-VITAMIN D) 600-400 mg-unit Tab Take 1 Tab by mouth two (2) times a day.  pravastatin (PRAVACHOL) 40 mg tablet Take 40 mg by mouth nightly.  esomeprazole (NEXIUM) 40 mg capsule Take 40 mg by mouth daily.  cyclobenzaprine (FLEXERIL) 10 mg tablet Take 0.5 Tabs by mouth three (3) times daily as needed for Muscle Spasm(s). 40 Tab 1  
 oxyCODONE-acetaminophen (PERCOCET) 5-325 mg per tablet Take 1-2 Tabs by mouth every four (4) hours as needed. Max Daily Amount: 12 Tabs. 40 Tab 0  
 benzonatate (TESSALON) 100 mg capsule Take 100 mg by mouth three (3) times daily as needed for Cough.  hydrocortisone (ANUSOL-HC) 2.5 % rectal cream Insert  into rectum two (2) times a day.  ondansetron hcl (ZOFRAN) 4 mg tablet Take 4 mg by mouth every eight (8) hours as needed for Nausea.  telmisartan-hydroCHLOROthiazide (MICARDIS HCT) 40-12.5 mg per tablet Take 1 Tab by mouth daily.  acetaminophen (TYLENOL) 325 mg tablet Take  by mouth every four (4) hours as needed for Pain. Past History Past Medical History: 
Past Medical History:  
Diagnosis Date  Adverse effect of anesthesia   
 very, very sleepy with Anesthesia  Adverse effect of anesthesia   
 decreased heartrate with colonoscopy  Anemia  Arthritis   
 osteoarthritis  CAD (coronary artery disease)  Depression  Diabetes ([address] adult onset  Difficult intubation   
 went bradycardc with symptoms, had diificuly waking up for 24 hours  Foot fracture 2008  
 right  GERD (gastroesophageal reflux disease)  Heart attack ([address] 2005  Hypercholesterolemia  Hyperlipidemia  Hypertension  Ill-defined condition 10/2018  
 reddened itchy bumps on lower abdomen after steroid injections in October, itchy patient states, very red, slightly opened with yellow looking head on them.  Ill-defined condition   
 history of frequent uti's, especially with stress and pressure on the stomach  Ill-defined condition   
 history of post nasal drip  Ill-defined condition   
 history of frequent small BM's at night  Ill-defined condition   
 recurren bladder infection  Menopause  Mitral regurgitation  Myocardial infarct ([address]  Nausea & vomiting   
 severe nausea  Sick sinus syndrome ([address]  Thyroid disease Past Surgical History: 
Past Surgical History:  
Procedure Laterality Date  HX CATARACT REMOVAL Right  HX GI    
 colonosopy  HX GYN    
 bartholin cyst  
 HX HEART CATHETERIZATION  2005  HX HEENT Bilateral   
 macular puckering  HX HEENT    
 sinus surgery  HX KNEE ARTHROSCOPY Right  HX ORTHOPAEDIC    
 cortisone shots to both knees  HX OTHER SURGICAL    
 shoulder surgery  HX OTHER SURGICAL    
 eye surgery  HX OTHER SURGICAL  many years ago  
 sinus surgery  HX OTHER SURGICAL    
 knee surgery  HX OTHER SURGICAL  10-22 & 10-29 Epidural steroid injection  HX SHOULDER ARTHROSCOPY Right Family History: 
Family History Problem Relation Age of Onset  Coronary Artery Disease Mother  Anemia Neg Hx  Arrhythmia Neg Hx  Asthma Neg Hx  Clotting Disorder Neg Hx  Diabetes Neg Hx  Fainting Neg Hx   
 Heart Attack Neg Hx   
 Heart Surgery Neg Hx  High Cholesterol Neg Hx  Hypertension Neg Hx  Pacemaker Neg Hx  Sudden Death Neg Hx Social History: 
Social History Tobacco Use  Smoking status: Never Smoker  Smokeless tobacco: Never Used Substance Use Topics  Alcohol use: No  
  Frequency: Never  Drug use: No  
 
 
Allergies: Allergies Allergen Reactions  Latex, Natural Rubber Not Reported This Time  Aspirin Nausea Only  Iodinated Contrast- Oral And Iv Dye Other (comments) Reactions: splotches on skin As per pt and spouse , pt is only allergic to red dye. Pt and spouse were questioned many times and denied allergies Say that pt did okay with contrast dye last cath done by Dr Tay Mary  Other Medication Itching All \"mycins\"  Pcn [Penicillins] Other (comments) Reactions: splotches on skin  Sting, Bee Swelling Review of Systems Review of Systems Constitutional: Negative for chills and fever. Gastrointestinal: Negative for diarrhea, nausea and vomiting. Musculoskeletal: Positive for back pain (lower). Skin: Positive for wound (bleeding from wound). All other systems reviewed and are negative. Physical Exam  
 
Vitals:  
 01/24/19 1400 01/24/19 1421 01/24/19 1530 BP: 174/59 Pulse: 81  60 Resp: 16 Temp: 98.9 °F (37.2 °C) Weight:  49.4 kg (109 lb) Height: 4' 8\" (1.422 m) Physical Exam  
Constitutional: She is oriented to person, place, and time. She appears well-developed and well-nourished. Appears very uncomfortable, lying on stretcher on her left side HENT:  
Head: Normocephalic and atraumatic. Neck: Normal range of motion. Neck supple. Cardiovascular: Normal rate, regular rhythm, normal heart sounds and intact distal pulses. No murmur heard. Pulmonary/Chest: Effort normal and breath sounds normal. No respiratory distress. She has no wheezes. She has no rales. Abdominal: Soft. Bowel sounds are normal. There is no tenderness. Genitourinary: Genitourinary Comments: External and introitus of vagina examined no blood seen Musculoskeletal:  
     Back: 
 
Moving BLE Neurological: She is alert and oriented to person, place, and time. Skin:  
See [facility]: She has a normal mood and affect. Judgment normal.  
Nursing note and vitals reviewed. Diagnostic Study Results Labs - No results found for this or any previous visit (from the past 12 hour(s)). Radiologic Studies - No orders to display CT Results  (Last 48 hours) None CXR Results  (Last 48 hours) None Medical Decision Making I am the first provider for this patient. I reviewed the vital signs, available nursing notes, past medical history, past surgical history, family history and social history. Vital Signs-Reviewed the patient's vital signs. Pulse Oximetry Analysis - 97% on RA Records Reviewed: Nursing Notes and Old Medical Records Provider Notes (Medical Decision Making): PROCEDURES: 
Procedures MEDICATIONS GIVEN IN THE ED: 
Medications  
oxyCODONE-acetaminophen (PERCOCET) 5-325 mg per tablet 1 Tab (1 Tab Oral Given 1/24/19 1518) ondansetron (ZOFRAN ODT) tablet 4 mg (4 mg Oral Given 1/24/19 1518) cyclobenzaprine (FLEXERIL) tablet 5 mg (5 mg Oral Given 1/24/19 1604) ondansetron (ZOFRAN ODT) tablet 4 mg (4 mg Oral Given 1/24/19 1915)  
oxyCODONE-acetaminophen (PERCOCET) 5-325 mg per tablet 1 Tab (1 Tab Oral Given 1/24/19 1915) ED COURSE:  
2:37 PM  
Initial assessment performed. CONSULT NOTE: 
2:52 PM 
Mirtha Maharaj PA-C spoke with Stephenie Pierre), Specialty: Terrence Marbin Discussed pt's hx, disposition, and available diagnostic and imaging results. Reviewed care plans. Pedro Cabello PA-C states that the pt was refusing to take her Percocet from the home health nurse and would not allow the nurse to see the wound. Written by Abdiaziz Hilton [facility], ED Scribe, as dictated by Mirtha Maharaj PA-C. CONSULT NOTE: 
3:40 PM 
Jonathan Arellano PA-C spoke with Knadi Devries, Specialty: Care Management Discussed pt's hx, disposition, and available diagnostic and imaging results. Reviewed care plans. Consultant states that she will review the case and will call back. Written by Terese Treadwell ED Scribe, as dictated by Jonathan Arellano PA-C. 
  
3:56 PM Spoke to the care manager who states that she would not be eligible for rehab as that requires a 3 day stay. She does not meet admission criteria. She does have at-home care and has orders for PT. They will look into getting her a bedside commode. Written by Kain Siu ED Scribe, as dictated by Jonathan Arellano PA-C. 
 
4:15 PM The patient is doing better and is more comfortable. She is supposed to be taking Flexeril and Percocet. I have ordered the Flexeril. Written by Kain Siu ED Scribe, as dictated by Jonathan Arellano PA-C. 
 
5:21 PM Discussed patient's history, exam, and available diagnostics results with [employer], MD, ED attending, who will evaluate pt. FACE-TO-FACE PROGRESS NOTE: 
5:55 PM 
The patient presents with post op pain. Pt was a rather difficult disposition, but was seen by care management and was able to ambulate in the ER; so  is able to take the pt home with some pain medicine. I have personally seen and examined the patient, reviewed the TONE's note and agree with findings and plan. Written by Scout Jim ED Scribe, as dictated by Molly Garber MD. 
 
6:08 PM Pt and pts  comfortable with discharge.  states they have people who can help her into the house. DISCUSSION: Pt presents one day post op lumbar laminectomy. She present complaining of increased pain and drainage from incision site. She is not taken any rx pain medication as her  states he did not want to leave her to go fill it.  She has at home healthcare who document pt was not willing to let her look at the incision today and came to ER. On exam, pt appears very uncomfortable. Moderate serosanguinous drainage but no purulent drainage. No redness or swelling at the incision site. 5/5 strength of BLE. C/O left leg pain which is new since surgery. Spoke to surgeons PA about the case who states all very typical one day post op. No further workup needed. Pt much more comfortable after dose of Percocet and Flexeril given. She was seen by case management and PT while in the ED. Able to ambulate with assistance. Pt  comfortable taking home as hes able to get some friends to help her in the house. Diagnosis and Disposition DISCHARGE NOTE: 
6:08 PM 
The patient is ready for discharge. The patient's signs, symptoms, diagnosis, and discharge instructions have been discussed and the patient and/or family has conveyed their understanding. The patient and/or family is to follow up as recommended or return to the ER should their symptoms worsen. Plan has been discussed and the patient and/or family is in agreement. Written by Home Galvez [address] [place] [address], ED Scribe, as dictated by Libia Harmon PA-C.  
 
CLINICAL IMPRESSION: 
1. Post-op pain 2. Non compliance w medication regimen PLAN: 
1. D/C Home 2. Discharge Medication List as of 1/24/2019  6:07 PM  
  
 
3. Follow-up Information Follow up With Specialties Details Why Contact Info Pablito Mcdaniel MD Orthopedic Surgery Schedule an appointment as soon as possible for a visit For follow up with orthopedics [address] [place] [address] Orthopedic and [address] [place] [address] 
[phone] [facility] EMERGENCY DEPT Emergency Medicine Go to As needed, as symptoms worsen [address] [place] [id number] 
[phone]  
  
 
_______________________________ Attestations: This note is prepared by Sampson Gilford, Alicia Romano, and Adele Baumann acting as Scribe for Libia Harmon PA-C. Marizol Antonio PA-C:  The scribe's documentation has been prepared under my direction and personally reviewed by me in its entirety. I confirm that the note above accurately reflects all work, treatment, procedures, and medical decision making performed by me. 
_______________________________

## 2019-01-24 NOTE — ED NOTES
at bedside, patient placed on bedpan, having frquent urination, reports urination with coughing and sneezing. Has history of recurrent UTI, on macrodantin for this has been under control, these symptoms started yesterday. Upon truning patient for bedpan did not serosanguinous  drainage on sheet. Pt crying out with pain.

## 2019-01-25 NOTE — ED NOTES
Discharge instructions reviewed with opportunity for questions provided. Pt vocalized understanding. Armband removed and shredded. Pt stable condition at time of discharge.  at bedside.

## 2019-04-08 ENCOUNTER — HOSPITAL ENCOUNTER (OUTPATIENT)
Dept: ULTRASOUND IMAGING | Age: 84
Discharge: HOME | End: 2019-04-08
Attending: FAMILY MEDICINE
Payer: MEDICARE

## 2019-04-08 DIAGNOSIS — R10.10 UPPER ABDOMINAL PAIN: ICD-10-CM

## 2019-04-08 PROCEDURE — 76705 ECHO EXAM OF ABDOMEN: CPT

## 2019-06-05 ENCOUNTER — HOSPITAL ENCOUNTER (OUTPATIENT)
Dept: MRI IMAGING | Age: 84
Discharge: HOME | End: 2019-06-05
Attending: ORTHOPAEDIC SURGERY
Payer: MEDICARE

## 2019-06-05 DIAGNOSIS — M54.50 LOW BACK PAIN: ICD-10-CM

## 2019-06-05 LAB — CREAT UR-MCNC: 1.2 MG/DL (ref 0.6–1.3)

## 2019-06-05 PROCEDURE — 74011636320 HC RX REV CODE- 636/320: Performed by: ORTHOPAEDIC SURGERY

## 2019-06-05 PROCEDURE — 82565 ASSAY OF CREATININE: CPT

## 2019-06-05 PROCEDURE — A9575 INJ GADOTERATE MEGLUMI 0.1ML: HCPCS | Performed by: ORTHOPAEDIC SURGERY

## 2019-06-05 PROCEDURE — 72158 MRI LUMBAR SPINE W/O & W/DYE: CPT

## 2019-06-05 RX ADMIN — GADOTERATE MEGLUMINE 10 ML: 376.9 INJECTION INTRAVENOUS at 14:29

## 2021-04-23 ENCOUNTER — APPOINTMENT (OUTPATIENT)
Dept: CT IMAGING | Age: 86
End: 2021-04-23
Attending: EMERGENCY MEDICINE
Payer: MEDICARE

## 2021-04-23 ENCOUNTER — HOSPITAL ENCOUNTER (EMERGENCY)
Age: 86
Discharge: HOME | End: 2021-04-23
Attending: EMERGENCY MEDICINE
Payer: MEDICARE

## 2021-04-23 VITALS
RESPIRATION RATE: 21 BRPM | SYSTOLIC BLOOD PRESSURE: 123 MMHG | HEART RATE: 82 BPM | WEIGHT: 106 LBS | TEMPERATURE: 97.5 F | DIASTOLIC BLOOD PRESSURE: 43 MMHG | BODY MASS INDEX: 23.84 KG/M2 | HEIGHT: 56 IN | OXYGEN SATURATION: 99 %

## 2021-04-23 DIAGNOSIS — R11.2 NON-INTRACTABLE VOMITING WITH NAUSEA, UNSPECIFIED VOMITING TYPE: Primary | ICD-10-CM

## 2021-04-23 DIAGNOSIS — I10 ESSENTIAL HYPERTENSION: ICD-10-CM

## 2021-04-23 LAB
ALBUMIN SERPL-MCNC: 3.7 G/DL (ref 3.4–5)
ALBUMIN/GLOB SERPL: 1 {RATIO} (ref 0.8–1.7)
ALP SERPL-CCNC: 75 U/L (ref 45–117)
ALT SERPL-CCNC: 23 U/L (ref 13–56)
AMORPH CRY URNS QL MICRO: ABNORMAL
ANION GAP SERPL CALC-SCNC: 7 MMOL/L (ref 3–18)
APPEARANCE UR: CLEAR
APTT PPP: 34 SEC (ref 23–36.4)
AST SERPL-CCNC: 22 U/L (ref 10–38)
BACTERIA URNS QL MICRO: ABNORMAL /HPF
BASOPHILS # BLD: 0 K/UL (ref 0–0.1)
BASOPHILS NFR BLD: 0 % (ref 0–2)
BILIRUB SERPL-MCNC: 0.4 MG/DL (ref 0.2–1)
BILIRUB UR QL: NEGATIVE
BUN SERPL-MCNC: 40 MG/DL (ref 7–18)
BUN/CREAT SERPL: 29 (ref 12–20)
CALCIUM SERPL-MCNC: 9.5 MG/DL (ref 8.5–10.1)
CHLORIDE SERPL-SCNC: 105 MMOL/L (ref 100–111)
CK MB CFR SERPL CALC: 2.9 % (ref 0–4)
CK MB SERPL-MCNC: 2.2 NG/ML (ref 5–25)
CK SERPL-CCNC: 76 U/L (ref 26–192)
CO2 SERPL-SCNC: 27 MMOL/L (ref 21–32)
COLOR UR: YELLOW
CREAT SERPL-MCNC: 1.39 MG/DL (ref 0.6–1.3)
DIFFERENTIAL METHOD BLD: ABNORMAL
EOSINOPHIL # BLD: 0.1 K/UL (ref 0–0.4)
EOSINOPHIL NFR BLD: 1 % (ref 0–5)
EPITH CASTS URNS QL MICRO: ABNORMAL /LPF (ref 0–5)
ERYTHROCYTE [DISTWIDTH] IN BLOOD BY AUTOMATED COUNT: 14.7 % (ref 11.6–14.5)
GLOBULIN SER CALC-MCNC: 3.8 G/DL (ref 2–4)
GLUCOSE SERPL-MCNC: 110 MG/DL (ref 74–99)
GLUCOSE UR STRIP.AUTO-MCNC: NEGATIVE MG/DL
HCT VFR BLD AUTO: 38.7 % (ref 35–45)
HGB BLD-MCNC: 12.4 G/DL (ref 12–16)
HGB UR QL STRIP: NEGATIVE
INR PPP: 1.1 (ref 0.8–1.2)
KETONES UR QL STRIP.AUTO: NEGATIVE MG/DL
LEUKOCYTE ESTERASE UR QL STRIP.AUTO: NEGATIVE
LIPASE SERPL-CCNC: 128 U/L (ref 73–393)
LYMPHOCYTES # BLD: 0.6 K/UL (ref 0.9–3.6)
LYMPHOCYTES NFR BLD: 8 % (ref 21–52)
MAGNESIUM SERPL-MCNC: 2.2 MG/DL (ref 1.6–2.6)
MCH RBC QN AUTO: 26.7 PG (ref 24–34)
MCHC RBC AUTO-ENTMCNC: 32 G/DL (ref 31–37)
MCV RBC AUTO: 83.2 FL (ref 74–97)
MONOCYTES # BLD: 0.6 K/UL (ref 0.05–1.2)
MONOCYTES NFR BLD: 8 % (ref 3–10)
MUCOUS THREADS URNS QL MICRO: ABNORMAL /LPF
NEUTS SEG # BLD: 6.1 K/UL (ref 1.8–8)
NEUTS SEG NFR BLD: 82 % (ref 40–73)
NITRITE UR QL STRIP.AUTO: NEGATIVE
PH UR STRIP: 6.5 [PH] (ref 5–8)
PLATELET # BLD AUTO: 210 K/UL (ref 135–420)
PMV BLD AUTO: 10.5 FL (ref 9.2–11.8)
POTASSIUM SERPL-SCNC: 4.2 MMOL/L (ref 3.5–5.5)
PROT SERPL-MCNC: 7.5 G/DL (ref 6.4–8.2)
PROT UR STRIP-MCNC: 30 MG/DL
PROTHROMBIN TIME: 13.7 SEC (ref 11.5–15.2)
RBC # BLD AUTO: 4.65 M/UL (ref 4.2–5.3)
RBC #/AREA URNS HPF: ABNORMAL /HPF (ref 0–5)
SODIUM SERPL-SCNC: 139 MMOL/L (ref 136–145)
SP GR UR REFRACTOMETRY: 1.01 (ref 1–1.03)
TROPONIN I SERPL-MCNC: 0.03 NG/ML (ref 0–0.04)
UROBILINOGEN UR QL STRIP.AUTO: 0.2 EU/DL (ref 0.2–1)
WBC # BLD AUTO: 7.4 K/UL (ref 4.6–13.2)
WBC URNS QL MICRO: ABNORMAL /HPF (ref 0–5)

## 2021-04-23 PROCEDURE — 83690 ASSAY OF LIPASE: CPT

## 2021-04-23 PROCEDURE — 80053 COMPREHEN METABOLIC PANEL: CPT

## 2021-04-23 PROCEDURE — 83735 ASSAY OF MAGNESIUM: CPT

## 2021-04-23 PROCEDURE — 85730 THROMBOPLASTIN TIME PARTIAL: CPT

## 2021-04-23 PROCEDURE — 85025 COMPLETE CBC W/AUTO DIFF WBC: CPT

## 2021-04-23 PROCEDURE — 81001 URINALYSIS AUTO W/SCOPE: CPT

## 2021-04-23 PROCEDURE — 99284 EMERGENCY DEPT VISIT MOD MDM: CPT

## 2021-04-23 PROCEDURE — 93005 ELECTROCARDIOGRAM TRACING: CPT

## 2021-04-23 PROCEDURE — 70450 CT HEAD/BRAIN W/O DYE: CPT

## 2021-04-23 PROCEDURE — 74176 CT ABD & PELVIS W/O CONTRAST: CPT

## 2021-04-23 PROCEDURE — 74011250636 HC RX REV CODE- 250/636: Performed by: EMERGENCY MEDICINE

## 2021-04-23 PROCEDURE — 96374 THER/PROPH/DIAG INJ IV PUSH: CPT

## 2021-04-23 PROCEDURE — 82553 CREATINE MB FRACTION: CPT

## 2021-04-23 PROCEDURE — 96375 TX/PRO/DX INJ NEW DRUG ADDON: CPT

## 2021-04-23 PROCEDURE — 85610 PROTHROMBIN TIME: CPT

## 2021-04-23 RX ORDER — HYDRALAZINE HYDROCHLORIDE 20 MG/ML
20 INJECTION INTRAMUSCULAR; INTRAVENOUS
Status: COMPLETED | OUTPATIENT
Start: 2021-04-23 | End: 2021-04-23

## 2021-04-23 RX ORDER — PROCHLORPERAZINE EDISYLATE 5 MG/ML
10 INJECTION INTRAMUSCULAR; INTRAVENOUS
Status: COMPLETED | OUTPATIENT
Start: 2021-04-23 | End: 2021-04-23

## 2021-04-23 RX ORDER — ONDANSETRON 4 MG/1
4 TABLET, ORALLY DISINTEGRATING ORAL
Qty: 20 TAB | Refills: 0 | Status: SHIPPED | OUTPATIENT
Start: 2021-04-23 | End: 2021-09-09

## 2021-04-23 RX ORDER — ONDANSETRON 2 MG/ML
4 INJECTION INTRAMUSCULAR; INTRAVENOUS
Status: DISCONTINUED | OUTPATIENT
Start: 2021-04-23 | End: 2021-04-23

## 2021-04-23 RX ORDER — ONDANSETRON 2 MG/ML
4 INJECTION INTRAMUSCULAR; INTRAVENOUS
Status: COMPLETED | OUTPATIENT
Start: 2021-04-23 | End: 2021-04-23

## 2021-04-23 RX ADMIN — PROCHLORPERAZINE EDISYLATE 10 MG: 5 INJECTION INTRAMUSCULAR; INTRAVENOUS at 13:31

## 2021-04-23 RX ADMIN — HYDRALAZINE HYDROCHLORIDE 20 MG: 20 INJECTION, SOLUTION INTRAMUSCULAR; INTRAVENOUS at 13:03

## 2021-04-23 RX ADMIN — SODIUM CHLORIDE 1000 ML: 900 INJECTION, SOLUTION INTRAVENOUS at 14:12

## 2021-04-23 RX ADMIN — SODIUM CHLORIDE 500 ML: 900 INJECTION, SOLUTION INTRAVENOUS at 13:01

## 2021-04-23 RX ADMIN — ONDANSETRON 4 MG: 2 INJECTION INTRAMUSCULAR; INTRAVENOUS at 13:03

## 2021-04-23 NOTE — ED PROVIDER NOTES
EMERGENCY DEPARTMENT HISTORY AND PHYSICAL EXAM    Date: 4/23/2021  Patient Name: Angelo Rockwell    History of Presenting Illness     Chief Complaint   Patient presents with    Vomiting    Nausea    Dizziness         History Provided By: Patient and patient's     Additional History (Context):   Luis Santamaria is a 80 y.o. female with PMHX hypertension, diabetes presents to the emergency department via private vehicle C/O nausea and vomiting that started today. Patient reports that she feels dizzy which causes her to feel nauseated and vomit. Patient reports that she did not have a bowel movement today however had bowel movement yesterday. Denies any diarrhea. Patient denying any abdominal pain. Pt denies fever, chest pain or shortness of breath, and any other sxs or complaints. No relieving or exacerbating factors identified. Denies any recent fall or injury. Reports that she has been compliant on her blood pressure medicine. PCP: Varghese Quijano MD    Current Facility-Administered Medications   Medication Dose Route Frequency Provider Last Rate Last Admin    sodium chloride 0.9 % bolus infusion 1,000 mL  1,000 mL IntraVENous ONCE Viral Syed, DO 1,000 mL/hr at 04/23/21 1412 1,000 mL at 04/23/21 1412     Current Outpatient Medications   Medication Sig Dispense Refill    ondansetron (Zofran ODT) 4 mg disintegrating tablet 1 Tab by SubLINGual route every eight (8) hours as needed for Nausea or Vomiting. 20 Tab 0    cyclobenzaprine (FLEXERIL) 10 mg tablet Take 0.5 Tabs by mouth three (3) times daily as needed for Muscle Spasm(s). 40 Tab 1    oxyCODONE-acetaminophen (PERCOCET) 5-325 mg per tablet Take 1-2 Tabs by mouth every four (4) hours as needed. Max Daily Amount: 12 Tabs. 40 Tab 0    benzonatate (TESSALON) 100 mg capsule Take 100 mg by mouth three (3) times daily as needed for Cough.  cyanocobalamin (VITAMIN B-12) 1,000 mcg tablet Take 1,000 mcg by mouth daily.       hydrocortisone (ANUSOL-HC) 2.5 % rectal cream Insert  into rectum two (2) times a day.  montelukast (SINGULAIR) 10 mg tablet Take 10 mg by mouth every evening.  ondansetron hcl (ZOFRAN) 4 mg tablet Take 4 mg by mouth every eight (8) hours as needed for Nausea.  gentamicin-prednisoLONE (PRED-G) 0.3-1 % ophthalmic drops Administer 1 Drop to both eyes four (4) times daily.  albuterol (PROVENTIL HFA) 90 mcg/actuation inhaler Take 2 Puffs by inhalation every four (4) hours as needed for Wheezing.  metoprolol succinate (TOPROL-XL) 25 mg XL tablet Take 25 mg by mouth nightly.  psyllium (METAMUCIL) packet Take 1 Packet by mouth nightly.  multivitamin, tx-iron-ca-min (THERA-M W/ IRON) 9 mg iron-400 mcg tab tablet Take 1 Tab by mouth daily.  Omega-3 Fatty Acids (FISH OIL) 500 mg cap Take  by mouth daily.  telmisartan-hydroCHLOROthiazide (MICARDIS HCT) 40-12.5 mg per tablet Take 1 Tab by mouth daily.  nitrofurantoin (MACRODANTIN) 50 mg capsule Take 50 mg by mouth every six (6) hours.  ferrous sulfate (IRON) 325 mg (65 mg iron) tablet Take  by mouth Daily (before breakfast).  acetaminophen (TYLENOL) 325 mg tablet Take  by mouth every four (4) hours as needed for Pain.  sitaGLIPtin (JANUVIA) 25 mg tablet Take 25 mg by mouth daily.  levothyroxine (SYNTHROID) 25 mcg tablet Take  by mouth nightly.  Calcium Carbonate-Vit D3-Min (CALCIUM-VITAMIN D) 600-400 mg-unit Tab Take 1 Tab by mouth two (2) times a day.  pravastatin (PRAVACHOL) 40 mg tablet Take 40 mg by mouth nightly.  esomeprazole (NEXIUM) 40 mg capsule Take 40 mg by mouth daily.          Past History     Past Medical History:  Past Medical History:   Diagnosis Date    Adverse effect of anesthesia     very, very sleepy with Anesthesia    Adverse effect of anesthesia     decreased heartrate with colonoscopy    Anemia     Arthritis     osteoarthritis    CAD (coronary artery disease)     Depression     Diabetes ([facility] [address]     adult onset    Difficult intubation     went bradycardc with symptoms, had diificuly waking up for 24 hours    Foot fracture 2008    right    GERD (gastroesophageal reflux disease)     Heart attack ([facility] [address] 2005    Hypercholesterolemia     Hyperlipidemia     Hypertension     Ill-defined condition 10/2018    reddened itchy bumps on lower abdomen after steroid injections in October, itchy patient states, very red, slightly opened with yellow looking head on them.     Ill-defined condition     history of frequent uti's, especially with stress and pressure on the stomach    Ill-defined condition     history of post nasal drip    Ill-defined condition     history of frequent small BM's at night    Ill-defined condition     recurren bladder infection    Menopause     Mitral regurgitation     Myocardial infarct (HCC)     Nausea & vomiting     severe nausea    Sick sinus syndrome (HCC)     Thyroid disease        Past Surgical History:  Past Surgical History:   Procedure Laterality Date    HX CATARACT REMOVAL Right     HX GI      colonosopy    HX GYN      bartholin cyst    HX HEART CATHETERIZATION  2005    HX HEENT Bilateral     macular puckering    HX HEENT      sinus surgery    HX KNEE ARTHROSCOPY Right     HX ORTHOPAEDIC      cortisone shots to both knees    HX OTHER SURGICAL      shoulder surgery    HX OTHER SURGICAL      eye surgery    HX OTHER SURGICAL  many years ago    sinus surgery    HX OTHER SURGICAL      knee surgery    HX OTHER SURGICAL  10-22 & 10-29    Epidural steroid injection    HX SHOULDER ARTHROSCOPY Right             Family History:  Family History   Problem Relation Age of Onset    Coronary Artery Disease Mother     Anemia Neg Hx     Arrhythmia Neg Hx     Asthma Neg Hx     Clotting Disorder Neg Hx     Diabetes Neg Hx     Fainting Neg Hx     Heart Attack Neg Hx     Heart Surgery Neg Hx     High Cholesterol Neg Hx     Hypertension Neg Hx     Pacemaker Neg Hx     Sudden Death Neg Hx        Social History:  Social History     Tobacco Use    Smoking status: Never Smoker    Smokeless tobacco: Never Used   Substance Use Topics    Alcohol use: No     Frequency: Never    Drug use: No       Allergies: Allergies   Allergen Reactions    Latex, Natural Rubber Not Reported This Time    Aspirin Nausea Only    Iodinated Contrast Media Other (comments)     Reactions: splotches on skin    As per pt and spouse , pt is only allergic to red dye. Pt and spouse were questioned many times and denied allergies  Say that pt did okay with contrast dye last cath done by Dr Isa Okeefe Other Medication Itching     All \"mycins\"    Pcn [Penicillins] Other (comments)     Reactions: splotches on skin    Sting, Bee Swelling         Review of Systems   Review of Systems   Constitutional: Negative for chills and fever. HENT: Negative for congestion, ear pain, sinus pain and sore throat. Eyes: Negative for pain and visual disturbance. Respiratory: Negative for cough and shortness of breath. Cardiovascular: Negative for chest pain and leg swelling. Gastrointestinal: Positive for nausea and vomiting. Negative for abdominal pain, constipation and diarrhea. Genitourinary: Negative for dysuria, hematuria, vaginal bleeding and vaginal discharge. Musculoskeletal: Negative for back pain and neck pain. Skin: Negative for rash and wound. Neurological: Positive for dizziness. Negative for tremors, weakness, light-headedness and numbness. All other systems reviewed and are negative.       Physical Exam     Vitals:    04/23/21 1223 04/23/21 1303 04/23/21 1330 04/23/21 1440   BP: (!) 187/76 (!) 168/75 (!) 131/50 (!) 123/43   Pulse: 77 62  82   Resp: 16   21   Temp: 97.5 °F (36.4 °C)      SpO2: 100%  100% 99%   Weight: 48.1 kg (106 lb)      Height: 4' 8\" (1.422 m)        Physical Exam    Nursing note and vitals reviewed    Constitutional: [facility] female, no acute distress, appears stated age  Head: Normocephalic, Atraumatic  Eyes: Pupils are equal, round, and reactive to light, EOMI, noninjected conjunctiva  Neck: Supple, non-tender, trachea midline, no JVD  Cardiovascular: Regular rate and rhythm, no murmurs, rubs, or gallops, + 2 radial pulses bilaterally  Chest: Normal work of breathing and symmetrical chest excursion bilaterally  Lungs: Clear to ausculation bilaterally, no wheezes, no rhonchi  Abdomen: Soft, non tender, non distended, normoactive bowel sounds  Back: No evidence of trauma or deformity  Extremities: No evidence of trauma or deformity, no LE edema. No streaking erythema, vesicular lesions, ulcerations or bulla  Skin: Warm and dry, normal cap refill  Neuro: Alert and appropriate, CN intact, normal speech, moving all 4 extremities freely and symmetrically. No upper or lower extremity drift bilaterally.   Psychiatric: Normal mood and affect       Diagnostic Study Results     Labs -     Recent Results (from the past 12 hour(s))   EKG, 12 LEAD, INITIAL    Collection Time: 04/23/21 12:52 PM   Result Value Ref Range    Ventricular Rate 69 BPM    Atrial Rate 69 BPM    P-R Interval 162 ms    QRS Duration 76 ms    Q-T Interval 436 ms    QTC Calculation (Bezet) 467 ms    Calculated P Axis 32 degrees    Calculated R Axis -32 degrees    Calculated T Axis 83 degrees    Diagnosis       Normal sinus rhythm  Possible Left atrial enlargement  Left axis deviation  Left ventricular hypertrophy  Abnormal ECG  When compared with ECG of 02-JAN-2019 14:49,  Inverted T waves have replaced nonspecific T wave abnormality in Lateral   leads     CBC WITH AUTOMATED DIFF    Collection Time: 04/23/21 12:55 PM   Result Value Ref Range    WBC 7.4 4.6 - 13.2 K/uL    RBC 4.65 4.20 - 5.30 M/uL    HGB 12.4 12.0 - 16.0 g/dL    HCT 38.7 35.0 - 45.0 %    MCV 83.2 74.0 - 97.0 FL    MCH 26.7 24.0 - 34.0 PG    MCHC 32.0 31.0 - 37.0 g/dL    RDW 14.7 (H) 11.6 - 14.5 %    PLATELET 264 764 - 691 K/uL    MPV 10.5 9.2 - 11.8 FL    NEUTROPHILS 82 (H) 40 - 73 %    LYMPHOCYTES 8 (L) 21 - 52 %    MONOCYTES 8 3 - 10 %    EOSINOPHILS 1 0 - 5 %    BASOPHILS 0 0 - 2 %    ABS. NEUTROPHILS 6.1 1.8 - 8.0 K/UL    ABS. LYMPHOCYTES 0.6 (L) 0.9 - 3.6 K/UL    ABS. MONOCYTES 0.6 0.05 - 1.2 K/UL    ABS. EOSINOPHILS 0.1 0.0 - 0.4 K/UL    ABS. BASOPHILS 0.0 0.0 - 0.1 K/UL    DF AUTOMATED     METABOLIC PANEL, COMPREHENSIVE    Collection Time: 04/23/21 12:55 PM   Result Value Ref Range    Sodium 139 136 - 145 mmol/L    Potassium 4.2 3.5 - 5.5 mmol/L    Chloride 105 100 - 111 mmol/L    CO2 27 21 - 32 mmol/L    Anion gap 7 3.0 - 18 mmol/L    Glucose 110 (H) 74 - 99 mg/dL    BUN 40 (H) 7.0 - 18 MG/DL    Creatinine 1.39 (H) 0.6 - 1.3 MG/DL    BUN/Creatinine ratio 29 (H) 12 - 20      GFR est AA 43 (L) >60 ml/min/1.73m2    GFR est non-AA 36 (L) >60 ml/min/1.73m2    Calcium 9.5 8.5 - 10.1 MG/DL    Bilirubin, total 0.4 0.2 - 1.0 MG/DL    ALT (SGPT) 23 13 - 56 U/L    AST (SGOT) 22 10 - 38 U/L    Alk.  phosphatase 75 45 - 117 U/L    Protein, total 7.5 6.4 - 8.2 g/dL    Albumin 3.7 3.4 - 5.0 g/dL    Globulin 3.8 2.0 - 4.0 g/dL    A-G Ratio 1.0 0.8 - 1.7     PROTHROMBIN TIME + INR    Collection Time: 04/23/21 12:55 PM   Result Value Ref Range    Prothrombin time 13.7 11.5 - 15.2 sec    INR 1.1 0.8 - 1.2     PTT    Collection Time: 04/23/21 12:55 PM   Result Value Ref Range    aPTT 34.0 23.0 - 36.4 SEC   LIPASE    Collection Time: 04/23/21 12:55 PM   Result Value Ref Range    Lipase 128 73 - 393 U/L   MAGNESIUM    Collection Time: 04/23/21 12:55 PM   Result Value Ref Range    Magnesium 2.2 1.6 - 2.6 mg/dL   CARDIAC PANEL,(CK, CKMB & TROPONIN)    Collection Time: 04/23/21 12:55 PM   Result Value Ref Range    CK - MB 2.2 <3.6 ng/ml    CK-MB Index 2.9 0.0 - 4.0 %    CK 76 26 - 192 U/L    Troponin-I, QT 0.03 0.0 - 0.045 NG/ML   URINALYSIS W/ RFLX MICROSCOPIC    Collection Time: 04/23/21  2:10 PM   Result Value Ref Range    Color YELLOW Appearance CLEAR      Specific gravity 1.012 1.005 - 1.030      pH (UA) 6.5 5.0 - 8.0      Protein 30 (A) NEG mg/dL    Glucose Negative NEG mg/dL    Ketone Negative NEG mg/dL    Bilirubin Negative NEG      Blood Negative NEG      Urobilinogen 0.2 0.2 - 1.0 EU/dL    Nitrites Negative NEG      Leukocyte Esterase Negative NEG     URINE MICROSCOPIC ONLY    Collection Time: 04/23/21  2:10 PM   Result Value Ref Range    WBC 0 to 3 0 - 5 /hpf    RBC 0 to 3 0 - 5 /hpf    Epithelial cells 1+ 0 - 5 /lpf    Bacteria 1+ (A) NEG /hpf    Mucus 2+ (A) NEG /lpf    Amorphous Crystals 1+ (A) NEG       Radiologic Studies -   CT ABD PELV WO CONT   Final Result      1. Small hiatal hernia without focal bowel wall thickening or inflammatory   change. 2. Multiple colonic diverticula without evidence of diverticulitis. 3. Respiratory motion limited examination of the included pulmonary parenchyma   with scattered areas of atelectasis and/or airspace disease         CT HEAD WO CONT   Final Result      No acute intracranial abnormality. CT Results  (Last 48 hours)               04/23/21 1321  CT ABD PELV WO CONT Final result    Impression:      1. Small hiatal hernia without focal bowel wall thickening or inflammatory   change. 2. Multiple colonic diverticula without evidence of diverticulitis. 3. Respiratory motion limited examination of the included pulmonary parenchyma   with scattered areas of atelectasis and/or airspace disease           Narrative:  EXAM: CT of the abdomen and pelvis       INDICATION: 80year-old patient with nausea and vomiting       COMPARISON: CT performed 11/23/2016       TECHNIQUE: Axial CT imaging of the abdomen and pelvis was performed without   intravenous contrast. Multiplanar reformats were generated.        One or more dose reduction techniques were used on this CT: automated exposure   control, adjustment of the mAs and/or kVp according to patient size, and   iterative reconstruction techniques. The specific techniques used on this CT   exam have been documented in the patient's electronic medical record. Digital   Imaging and Communications in Medicine (DICOM) format image data are available   to nonaffiliated external healthcare facilities or entities on a secure, media   free, reciprocally searchable basis with patient authorization for at least a   12-month period after this study. _______________       FINDINGS:       LOWER CHEST: Respiratory motion limited examination of the lungs with scattered   areas of linear and groundglass opacity throughout the included portions right   middle lobe and bilateral lower lung zones. Mild cardiac enlargement without   evidence of pericardial effusion. LIVER, BILIARY: Unenhanced appearance of the liver demonstrates no focal   abnormality. No biliary dilation. Gallbladder is unremarkable. PANCREAS: Normal.       SPLEEN: Normal.       ADRENALS: Normal.       KIDNEYS/URETERS/BLADDER: No hydronephrosis involving either kidney. No   nephrolithiasis. Peripheral upper pole right renal cyst. Urinary bladder is   unremarkable in CT appearance       PELVIC ORGANS: Uterus is surgically absent. VASCULATURE: Extensive aortobiiliac atherosclerotic vascular calcification is   present without evidence of aneurysmal dilatation. LYMPH NODES: No enlarged lymph nodes. GASTROINTESTINAL TRACT: Small hiatal hernia. No focal bowel wall thickening or   dilatation. No morphology of bowel obstruction. No free intraperitoneal gas. Multiple colonic diverticula without evidence of diverticulitis. BONES: Mild anterolisthesis L4 and L5 with moderate anterolisthesis L5 on S1. Diffuse spondylosis and lower lumbar predominant facet joint osteoarthritis. Faint serpiginous sclerotic densities involving the apices of each femoral head   suspicious for small areas of avascular necrosis.        OTHER: None.       _______________ 04/23/21 1315  CT HEAD WO CONT Final result    Impression:      No acute intracranial abnormality. Narrative:  EXAM: CT head       INDICATION: Hypertension. COMPARISON: 12/20/2015       TECHNIQUE: Axial CT imaging of the head was performed without intravenous   contrast. Standard multiplanar coronal and sagittal reformatted images were   obtained and are included in interpretation. One or more dose reduction techniques were used on this CT: automated exposure   control, adjustment of the mAs and/or kVp according to patient size, and   iterative reconstruction techniques. The specific techniques used on this CT   exam have been documented in the patient's electronic medical record. Digital   Imaging and Communications in Medicine (DICOM) format image data are available   to nonaffiliated external healthcare facilities or entities on a secure, media   free, reciprocally searchable basis with patient authorization for at least a   12-month period after this study. _______________       FINDINGS:       BRAIN AND POSTERIOR FOSSA: Mild atrophy. Mild periventricular white matter   hypoattenuation which is nonspecific but likely represents chronic ischemic   changes. No evidence of acute large vessel transcortical infarct or acute   parenchymal hemorrhage. No midline shift or hydrocephalus. EXTRA-AXIAL SPACES AND MENINGES: There are no abnormal extra-axial fluid   collections. CALVARIUM: Intact. SINUSES: Clear. OTHER: None.       _______________               CXR Results  (Last 48 hours)    None            Medical Decision Making   I am the first provider for this patient. I reviewed the vital signs, available nursing notes, past medical history, past surgical history, family history and social history. Vital Signs-Reviewed the patient's vital signs.     Pulse Oximetry Analysis -100% on room air    Cardiac Monitor:  Rate: 77 bpm  Rhythm: Regular    EKG interpretation: (Preliminary)  12:29 PM   Normal sinus rhythm at 69 bpm.  RI interval 162 ms. QRS 76 ms. QTc 467 ms. No acute ST elevation    Records Reviewed: Nursing Notes and Old Medical Records    Provider Notes:   80 y.o. female with a history of hypertension, diabetes who presents with nausea, vomiting and feeling dizzy. On exam patient is very hypertensive with a blood pressure of 187/76. She presents with no focal neurological deficits, moving all 4 extremities freely and symmetrically with no upper or lower extremity drift bilaterally. However with her nausea, vomiting and sensation dizziness, and elevated blood pressure will obtain a CT scan to rule out intracranial bleed. Patient does not present as an acute stroke. No indication for MRI or code stroke. We will also obtain CT imaging of her abdomen pelvis to rule out obstruction. Will eval for any infectious etiology. EKG reassuring with no acute ST elevation in addition patient with no chest pain. Procedures:  Procedures    ED Course:   12:29 PM  Initial assessment performed. The patients presenting problems have been discussed, and they are in agreement with the care plan formulated and outlined with them. I have encouraged them to ask questions as they arise throughout their visit. 2:55 PM  Patient's labs significant for chronic kidney disease. Creatinine is 1.39, BUN of 40. This is not far from patient's baseline. Patient not meeting acute kidney injury criteria. Patient with no leukocytosis or bandemia. UA is not consistent with UTI. CT scan of the abdomen pelvis with small hiatal hernia without bowel wall thickening or inflammation. No acute process in the abdomen pelvis to explain her symptoms. CT scan of the head showing no acute process. Patient's blood pressure has improved to 123/43. On reassessment, patient sleeping comfortably. Easily arousable.   Discussed possibility of foodborne illness versus viral gastroenteritis to explain her symptoms. Will discharge with Zofran for symptom control. Diagnosis and Disposition       DISCHARGE NOTE:  2:55 PM    Luis Solis's  results have been reviewed with her. She has been counseled regarding her diagnosis, treatment, and plan. She verbally conveys understanding and agreement of the signs, symptoms, diagnosis, treatment and prognosis and additionally agrees to follow up as discussed. She also agrees with the care-plan and conveys that all of her questions have been answered. I have also provided discharge instructions for her that include: educational information regarding their diagnosis and treatment, and list of reasons why they would want to return to the ED prior to their follow-up appointment, should her condition change. She has been provided with education for proper emergency department utilization. CLINICAL IMPRESSION:    1. Non-intractable vomiting with nausea, unspecified vomiting type    2. Essential hypertension        PLAN:  1. D/C Home  2. Current Discharge Medication List      START taking these medications    Details   ondansetron (Zofran ODT) 4 mg disintegrating tablet 1 Tab by SubLINGual route every eight (8) hours as needed for Nausea or Vomiting. Qty: 20 Tab, Refills: 0           3. Follow-up Information     Follow up With Specialties Details Why Contact Kadie Cunningham MD Family Medicine Schedule an appointment as soon as possible for a visit in 2 days  [address] [facility]      [facility] EMERGENCY DEPT Emergency Medicine  As needed if symptoms worsen [address] [place] [id number]  [phone]        ____________________________________     Please note that this dictation was completed with [employer], the [employer] voice recognition software. Quite often unanticipated grammatical, syntax, homophones, and other interpretive errors are inadvertently transcribed by the computer software.   Please disregard these errors. Please excuse any errors that have escaped final proofreading.

## 2021-04-23 NOTE — ED TRIAGE NOTES
Pt presented to the ED with c/o of N/V . Onset was three days ago. Pt reported feeling dizzy and unable to stand.  Pt denies diarrhea, SOB

## 2021-04-24 LAB
ATRIAL RATE: 69 BPM
CALCULATED P AXIS, ECG09: 32 DEGREES
CALCULATED R AXIS, ECG10: -32 DEGREES
CALCULATED T AXIS, ECG11: 83 DEGREES
DIAGNOSIS, 93000: NORMAL
P-R INTERVAL, ECG05: 162 MS
Q-T INTERVAL, ECG07: 436 MS
QRS DURATION, ECG06: 76 MS
QTC CALCULATION (BEZET), ECG08: 467 MS
VENTRICULAR RATE, ECG03: 69 BPM

## 2021-07-13 NOTE — DISCHARGE INSTRUCTIONS
Cardiac Catheterization/Angiography Discharge Instructions    *Check the puncture site frequently for swelling or bleeding. If you see any bleeding, lie down and apply pressure over the area with a clean town or washcloth. Notify your doctor for any redness, swelling, drainage or oozing from the puncture site. Notify your doctor for any fever or chills. *If the leg or arm with the puncture becomes cold, numb or painful, call Dr Jagjit Moss     *Activity should be limited for the next 48 hours. Climb stairs as little as possible and avoid any stooping, bending or strenuous activity for 48 hours. No heavy lifting (anything over 10 pounds) for three days. *Do not drive for 48 hours. *You may resume your usual diet. Drink more fluids than usual.    *Have a responsible person drive you home and stay with you for at least 24 hours after your heart catheterization/angiography. *You may remove the bandage from your Left and Arm in 24 hours. You may shower in 24 hours. No tub baths, hot tubs or swimming for one week. Do not place any lotions, creams, powders, ointments over the puncture site for one week. You may place a clean band-aid over the puncture site each day for 5 days. Change this daily. DISCHARGE SUMMARY from Nurse    PATIENT INSTRUCTIONS:    After general anesthesia or intravenous sedation, for 24 hours or while taking prescription Narcotics:  · Limit your activities  · Do not drive and operate hazardous machinery  · Do not make important personal or business decisions  · Do  not drink alcoholic beverages  · If you have not urinated within 8 hours after discharge, please contact your surgeon on call.     Report the following to your surgeon:  · Excessive pain, swelling, redness or odor of or around the surgical area  · Temperature over 100.5  · Nausea and vomiting lasting longer than 4 hours or if unable to take medications  · Any signs of decreased circulation or nerve impairment to extremity: change in color, persistent  numbness, tingling, coldness or increase pain  · Any questions    What to do at Home:  Recommended activity: Activity as tolerated,   . *  Please give a list of your current medications to your Primary Care Provider. *  Please update this list whenever your medications are discontinued, doses are      changed, or new medications (including over-the-counter products) are added. *  Please carry medication information at all times in case of emergency situations. These are general instructions for a healthy lifestyle:    No smoking/ No tobacco products/ Avoid exposure to second hand smoke  Surgeon General's Warning:  Quitting smoking now greatly reduces serious risk to your health. Obesity, smoking, and sedentary lifestyle greatly increases your risk for illness    A healthy diet, regular physical exercise & weight monitoring are important for maintaining a healthy lifestyle    You may be retaining fluid if you have a history of heart failure or if you experience any of the following symptoms:  Weight gain of 3 pounds or more overnight or 5 pounds in a week, increased swelling in our hands or feet or shortness of breath while lying flat in bed. Please call your doctor as soon as you notice any of these symptoms; do not wait until your next office visit. Recognize signs and symptoms of STROKE:    F-face looks uneven    A-arms unable to move or move unevenly    S-speech slurred or non-existent    T-time-call 911 as soon as signs and symptoms begin-DO NOT go       Back to bed or wait to see if you get better-TIME IS BRAIN. Warning Signs of HEART ATTACK     Call 911 if you have these symptoms:   Chest discomfort. Most heart attacks involve discomfort in the center of the chest that lasts more than a few minutes, or that goes away and comes back. It can feel like uncomfortable pressure, squeezing, fullness, or pain.    Discomfort in other areas of the upper body. Symptoms can include pain or discomfort in one or both arms, the back, neck, jaw, or stomach.  Shortness of breath with or without chest discomfort.  Other signs may include breaking out in a cold sweat, nausea, or lightheadedness. Don't wait more than five minutes to call 911 - MINUTES MATTER! Fast action can save your life. Calling 911 is almost always the fastest way to get lifesaving treatment. Emergency Medical Services staff can begin treatment when they arrive -- up to an hour sooner than if someone gets to the hospital by car. The discharge information has been reviewed with the patient and spouse. The patient and spouse verbalized understanding. Discharge medications reviewed with the patient and spouse and appropriate educational materials and side effects teaching were provided.     Patient armband removed and shredded    ___________________________________________________________________________________________________________________________________ 13-Jul-2021 12:24

## 2021-09-02 ENCOUNTER — APPOINTMENT (OUTPATIENT)
Dept: GENERAL RADIOLOGY | Age: 86
DRG: 194 | End: 2021-09-02
Attending: PHYSICIAN ASSISTANT
Payer: MEDICARE

## 2021-09-02 ENCOUNTER — APPOINTMENT (OUTPATIENT)
Dept: CT IMAGING | Age: 86
DRG: 194 | End: 2021-09-02
Attending: PHYSICIAN ASSISTANT
Payer: MEDICARE

## 2021-09-02 ENCOUNTER — HOSPITAL ENCOUNTER (INPATIENT)
Age: 86
LOS: 7 days | Discharge: NURSING, REHAB OR LONG-TERM CARE | DRG: 194 | End: 2021-09-09
Attending: EMERGENCY MEDICINE | Admitting: HOSPITALIST
Payer: MEDICARE

## 2021-09-02 DIAGNOSIS — J18.9 COMMUNITY ACQUIRED PNEUMONIA, UNSPECIFIED LATERALITY: Primary | ICD-10-CM

## 2021-09-02 DIAGNOSIS — Z20.822 SUSPECTED COVID-19 VIRUS INFECTION: ICD-10-CM

## 2021-09-02 PROBLEM — F03.90 DEMENTIA (HCC): Status: ACTIVE | Noted: 2021-09-02

## 2021-09-02 PROBLEM — E03.9 ACQUIRED HYPOTHYROIDISM: Status: ACTIVE | Noted: 2021-09-02

## 2021-09-02 PROBLEM — R41.0 DELIRIUM: Status: ACTIVE | Noted: 2021-09-02

## 2021-09-02 PROBLEM — Z92.29 COVID-19 VACCINE SERIES COMPLETED: Status: ACTIVE | Noted: 2021-09-02

## 2021-09-02 LAB
ALBUMIN SERPL-MCNC: 3.5 G/DL (ref 3.4–5)
ALBUMIN/GLOB SERPL: 0.9 {RATIO} (ref 0.8–1.7)
ALP SERPL-CCNC: 88 U/L (ref 45–117)
ALT SERPL-CCNC: 22 U/L (ref 13–56)
ANION GAP SERPL CALC-SCNC: 10 MMOL/L (ref 3–18)
APPEARANCE UR: ABNORMAL
APTT PPP: 27.8 SEC (ref 23–36.4)
AST SERPL-CCNC: 27 U/L (ref 10–38)
ATRIAL RATE: 69 BPM
BACTERIA URNS QL MICRO: ABNORMAL /HPF
BASOPHILS # BLD: 0 K/UL (ref 0–0.1)
BASOPHILS NFR BLD: 0 % (ref 0–2)
BILIRUB SERPL-MCNC: 0.6 MG/DL (ref 0.2–1)
BILIRUB UR QL: NEGATIVE
BUN SERPL-MCNC: 42 MG/DL (ref 7–18)
BUN/CREAT SERPL: 31 (ref 12–20)
CALCIUM SERPL-MCNC: 9.5 MG/DL (ref 8.5–10.1)
CALCULATED P AXIS, ECG09: 27 DEGREES
CALCULATED R AXIS, ECG10: -34 DEGREES
CALCULATED T AXIS, ECG11: 89 DEGREES
CHLORIDE SERPL-SCNC: 107 MMOL/L (ref 100–111)
CK MB CFR SERPL CALC: 4.5 % (ref 0–4)
CK MB SERPL-MCNC: 2.6 NG/ML (ref 5–25)
CK SERPL-CCNC: 58 U/L (ref 26–192)
CO2 SERPL-SCNC: 23 MMOL/L (ref 21–32)
COLOR UR: YELLOW
COVID-19 RAPID TEST, COVR: NOT DETECTED
CREAT SERPL-MCNC: 1.35 MG/DL (ref 0.6–1.3)
DIAGNOSIS, 93000: NORMAL
DIFFERENTIAL METHOD BLD: ABNORMAL
EOSINOPHIL # BLD: 0.1 K/UL (ref 0–0.4)
EOSINOPHIL NFR BLD: 1 % (ref 0–5)
EPITH CASTS URNS QL MICRO: ABNORMAL /LPF (ref 0–5)
ERYTHROCYTE [DISTWIDTH] IN BLOOD BY AUTOMATED COUNT: 16.1 % (ref 11.6–14.5)
EST. AVERAGE GLUCOSE BLD GHB EST-MCNC: 126 MG/DL
GLOBULIN SER CALC-MCNC: 3.8 G/DL (ref 2–4)
GLUCOSE BLD STRIP.AUTO-MCNC: 107 MG/DL (ref 70–110)
GLUCOSE SERPL-MCNC: 195 MG/DL (ref 74–99)
GLUCOSE UR STRIP.AUTO-MCNC: 100 MG/DL
HBA1C MFR BLD: 6 % (ref 4.2–5.6)
HCT VFR BLD AUTO: 38.9 % (ref 35–45)
HGB BLD-MCNC: 12.7 G/DL (ref 12–16)
HGB UR QL STRIP: ABNORMAL
INR PPP: 1 (ref 0.8–1.2)
KETONES UR QL STRIP.AUTO: NEGATIVE MG/DL
LACTATE BLD-SCNC: 1 MMOL/L (ref 0.4–2)
LACTATE BLD-SCNC: 2.69 MMOL/L (ref 0.4–2)
LEUKOCYTE ESTERASE UR QL STRIP.AUTO: ABNORMAL
LIPASE SERPL-CCNC: 145 U/L (ref 73–393)
LYMPHOCYTES # BLD: 1.2 K/UL (ref 0.9–3.6)
LYMPHOCYTES NFR BLD: 14 % (ref 21–52)
MAGNESIUM SERPL-MCNC: 2 MG/DL (ref 1.6–2.6)
MCH RBC QN AUTO: 26.3 PG (ref 24–34)
MCHC RBC AUTO-ENTMCNC: 32.6 G/DL (ref 31–37)
MCV RBC AUTO: 80.5 FL (ref 78–100)
MONOCYTES # BLD: 0.6 K/UL (ref 0.05–1.2)
MONOCYTES NFR BLD: 7 % (ref 3–10)
NEUTS SEG # BLD: 6.8 K/UL (ref 1.8–8)
NEUTS SEG NFR BLD: 77 % (ref 40–73)
NITRITE UR QL STRIP.AUTO: NEGATIVE
P-R INTERVAL, ECG05: 166 MS
PH UR STRIP: 8 [PH] (ref 5–8)
PLATELET # BLD AUTO: 262 K/UL (ref 135–420)
PMV BLD AUTO: 11.1 FL (ref 9.2–11.8)
POTASSIUM SERPL-SCNC: 3.9 MMOL/L (ref 3.5–5.5)
PROT SERPL-MCNC: 7.3 G/DL (ref 6.4–8.2)
PROT UR STRIP-MCNC: 100 MG/DL
PROTHROMBIN TIME: 12.7 SEC (ref 11.5–15.2)
Q-T INTERVAL, ECG07: 452 MS
QRS DURATION, ECG06: 88 MS
QTC CALCULATION (BEZET), ECG08: 484 MS
RBC # BLD AUTO: 4.83 M/UL (ref 4.2–5.3)
RBC #/AREA URNS HPF: ABNORMAL /HPF (ref 0–5)
SODIUM SERPL-SCNC: 140 MMOL/L (ref 136–145)
SOURCE, COVRS: NORMAL
SP GR UR REFRACTOMETRY: 1.01 (ref 1–1.03)
TROPONIN I SERPL-MCNC: <0.02 NG/ML (ref 0–0.04)
TSH SERPL DL<=0.05 MIU/L-ACNC: 3.31 UIU/ML (ref 0.36–3.74)
UROBILINOGEN UR QL STRIP.AUTO: 0.2 EU/DL (ref 0.2–1)
VENTRICULAR RATE, ECG03: 69 BPM
WBC # BLD AUTO: 8.8 K/UL (ref 4.6–13.2)
WBC URNS QL MICRO: ABNORMAL /HPF (ref 0–5)

## 2021-09-02 PROCEDURE — 87635 SARS-COV-2 COVID-19 AMP PRB: CPT

## 2021-09-02 PROCEDURE — 84443 ASSAY THYROID STIM HORMONE: CPT

## 2021-09-02 PROCEDURE — 74011250636 HC RX REV CODE- 250/636: Performed by: EMERGENCY MEDICINE

## 2021-09-02 PROCEDURE — 85610 PROTHROMBIN TIME: CPT

## 2021-09-02 PROCEDURE — 83690 ASSAY OF LIPASE: CPT

## 2021-09-02 PROCEDURE — 80053 COMPREHEN METABOLIC PANEL: CPT

## 2021-09-02 PROCEDURE — 71250 CT THORAX DX C-: CPT

## 2021-09-02 PROCEDURE — 83605 ASSAY OF LACTIC ACID: CPT

## 2021-09-02 PROCEDURE — 74011250636 HC RX REV CODE- 250/636: Performed by: PHYSICIAN ASSISTANT

## 2021-09-02 PROCEDURE — 74011000250 HC RX REV CODE- 250: Performed by: PHYSICIAN ASSISTANT

## 2021-09-02 PROCEDURE — 85025 COMPLETE CBC W/AUTO DIFF WBC: CPT

## 2021-09-02 PROCEDURE — 96376 TX/PRO/DX INJ SAME DRUG ADON: CPT

## 2021-09-02 PROCEDURE — 87040 BLOOD CULTURE FOR BACTERIA: CPT

## 2021-09-02 PROCEDURE — 74011250636 HC RX REV CODE- 250/636: Performed by: HOSPITALIST

## 2021-09-02 PROCEDURE — 74011250637 HC RX REV CODE- 250/637: Performed by: PHYSICIAN ASSISTANT

## 2021-09-02 PROCEDURE — 96375 TX/PRO/DX INJ NEW DRUG ADDON: CPT

## 2021-09-02 PROCEDURE — 82553 CREATINE MB FRACTION: CPT

## 2021-09-02 PROCEDURE — 85730 THROMBOPLASTIN TIME PARTIAL: CPT

## 2021-09-02 PROCEDURE — 65270000029 HC RM PRIVATE

## 2021-09-02 PROCEDURE — 99285 EMERGENCY DEPT VISIT HI MDM: CPT

## 2021-09-02 PROCEDURE — 93005 ELECTROCARDIOGRAM TRACING: CPT

## 2021-09-02 PROCEDURE — U0003 INFECTIOUS AGENT DETECTION BY NUCLEIC ACID (DNA OR RNA); SEVERE ACUTE RESPIRATORY SYNDROME CORONAVIRUS 2 (SARS-COV-2) (CORONAVIRUS DISEASE [COVID-19]), AMPLIFIED PROBE TECHNIQUE, MAKING USE OF HIGH THROUGHPUT TECHNOLOGIES AS DESCRIBED BY CMS-2020-01-R: HCPCS

## 2021-09-02 PROCEDURE — 70450 CT HEAD/BRAIN W/O DYE: CPT

## 2021-09-02 PROCEDURE — 83036 HEMOGLOBIN GLYCOSYLATED A1C: CPT

## 2021-09-02 PROCEDURE — 83735 ASSAY OF MAGNESIUM: CPT

## 2021-09-02 PROCEDURE — 96374 THER/PROPH/DIAG INJ IV PUSH: CPT

## 2021-09-02 PROCEDURE — 81001 URINALYSIS AUTO W/SCOPE: CPT

## 2021-09-02 PROCEDURE — 71045 X-RAY EXAM CHEST 1 VIEW: CPT

## 2021-09-02 PROCEDURE — 82962 GLUCOSE BLOOD TEST: CPT

## 2021-09-02 PROCEDURE — 87086 URINE CULTURE/COLONY COUNT: CPT

## 2021-09-02 PROCEDURE — 74011000258 HC RX REV CODE- 258: Performed by: PHYSICIAN ASSISTANT

## 2021-09-02 RX ORDER — HEPARIN SODIUM 5000 [USP'U]/ML
5000 INJECTION, SOLUTION INTRAVENOUS; SUBCUTANEOUS EVERY 8 HOURS
Status: DISCONTINUED | OUTPATIENT
Start: 2021-09-02 | End: 2021-09-09 | Stop reason: HOSPADM

## 2021-09-02 RX ORDER — ONDANSETRON 2 MG/ML
4 INJECTION INTRAMUSCULAR; INTRAVENOUS
Status: COMPLETED | OUTPATIENT
Start: 2021-09-02 | End: 2021-09-02

## 2021-09-02 RX ORDER — DEXAMETHASONE SODIUM PHOSPHATE 4 MG/ML
10 INJECTION, SOLUTION INTRA-ARTICULAR; INTRALESIONAL; INTRAMUSCULAR; INTRAVENOUS; SOFT TISSUE
Status: COMPLETED | OUTPATIENT
Start: 2021-09-02 | End: 2021-09-02

## 2021-09-02 RX ORDER — DEXTROSE 50 % IN WATER (D50W) INTRAVENOUS SYRINGE
50 AS NEEDED
Status: DISCONTINUED | OUTPATIENT
Start: 2021-09-02 | End: 2021-09-09 | Stop reason: HOSPADM

## 2021-09-02 RX ORDER — LORAZEPAM 2 MG/ML
1 INJECTION INTRAMUSCULAR ONCE
Status: COMPLETED | OUTPATIENT
Start: 2021-09-02 | End: 2021-09-02

## 2021-09-02 RX ORDER — ACETAMINOPHEN 325 MG/1
975 TABLET ORAL
Status: ACTIVE | OUTPATIENT
Start: 2021-09-02 | End: 2021-09-03

## 2021-09-02 RX ORDER — OMEGA-3-ACID ETHYL ESTERS 1 G/1
1 CAPSULE, LIQUID FILLED ORAL DAILY
Status: DISCONTINUED | OUTPATIENT
Start: 2021-09-03 | End: 2021-09-09 | Stop reason: HOSPADM

## 2021-09-02 RX ORDER — SODIUM CHLORIDE 9 MG/ML
100 INJECTION, SOLUTION INTRAVENOUS CONTINUOUS
Status: DISCONTINUED | OUTPATIENT
Start: 2021-09-02 | End: 2021-09-09 | Stop reason: HOSPADM

## 2021-09-02 RX ORDER — INSULIN LISPRO 100 [IU]/ML
INJECTION, SOLUTION INTRAVENOUS; SUBCUTANEOUS
Status: DISCONTINUED | OUTPATIENT
Start: 2021-09-02 | End: 2021-09-09 | Stop reason: HOSPADM

## 2021-09-02 RX ORDER — FAMOTIDINE 10 MG/ML
20 INJECTION INTRAVENOUS
Status: COMPLETED | OUTPATIENT
Start: 2021-09-02 | End: 2021-09-02

## 2021-09-02 RX ORDER — PRAVASTATIN SODIUM 20 MG/1
40 TABLET ORAL
Status: DISCONTINUED | OUTPATIENT
Start: 2021-09-03 | End: 2021-09-09 | Stop reason: HOSPADM

## 2021-09-02 RX ORDER — METOPROLOL SUCCINATE 25 MG/1
25 TABLET, EXTENDED RELEASE ORAL
Status: DISCONTINUED | OUTPATIENT
Start: 2021-09-03 | End: 2021-09-09 | Stop reason: HOSPADM

## 2021-09-02 RX ORDER — LABETALOL HCL 20 MG/4 ML
10 SYRINGE (ML) INTRAVENOUS ONCE
Status: COMPLETED | OUTPATIENT
Start: 2021-09-02 | End: 2021-09-02

## 2021-09-02 RX ORDER — HYDROCHLOROTHIAZIDE 25 MG/1
12.5 TABLET ORAL DAILY
Status: DISCONTINUED | OUTPATIENT
Start: 2021-09-03 | End: 2021-09-08

## 2021-09-02 RX ORDER — TELMISARTAN 40 MG/1
40 TABLET ORAL DAILY
Status: DISCONTINUED | OUTPATIENT
Start: 2021-09-03 | End: 2021-09-08

## 2021-09-02 RX ORDER — MAGNESIUM SULFATE 100 %
16 CRYSTALS MISCELLANEOUS AS NEEDED
Status: DISCONTINUED | OUTPATIENT
Start: 2021-09-02 | End: 2021-09-09 | Stop reason: HOSPADM

## 2021-09-02 RX ORDER — MONTELUKAST SODIUM 10 MG/1
10 TABLET ORAL EVERY EVENING
Status: DISCONTINUED | OUTPATIENT
Start: 2021-09-03 | End: 2021-09-09 | Stop reason: HOSPADM

## 2021-09-02 RX ORDER — LEVOTHYROXINE SODIUM 25 UG/1
25 TABLET ORAL
Status: DISCONTINUED | OUTPATIENT
Start: 2021-09-03 | End: 2021-09-09 | Stop reason: HOSPADM

## 2021-09-02 RX ORDER — MORPHINE SULFATE 4 MG/ML
4 INJECTION INTRAVENOUS
Status: COMPLETED | OUTPATIENT
Start: 2021-09-02 | End: 2021-09-02

## 2021-09-02 RX ADMIN — HEPARIN SODIUM 5000 UNITS: 5000 INJECTION INTRAVENOUS; SUBCUTANEOUS at 17:33

## 2021-09-02 RX ADMIN — LABETALOL HYDROCHLORIDE 10 MG: 5 INJECTION, SOLUTION INTRAVENOUS at 12:51

## 2021-09-02 RX ADMIN — SODIUM CHLORIDE 1000 ML: 900 INJECTION, SOLUTION INTRAVENOUS at 10:20

## 2021-09-02 RX ADMIN — DEXAMETHASONE SODIUM PHOSPHATE 10 MG: 4 INJECTION, SOLUTION INTRAMUSCULAR; INTRAVENOUS at 12:40

## 2021-09-02 RX ADMIN — ONDANSETRON 4 MG: 2 INJECTION INTRAMUSCULAR; INTRAVENOUS at 10:19

## 2021-09-02 RX ADMIN — MORPHINE SULFATE 4 MG: 4 INJECTION INTRAVENOUS at 11:31

## 2021-09-02 RX ADMIN — LORAZEPAM 1 MG: 2 INJECTION INTRAMUSCULAR; INTRAVENOUS at 15:04

## 2021-09-02 RX ADMIN — AZITHROMYCIN MONOHYDRATE 500 MG: 500 INJECTION, POWDER, LYOPHILIZED, FOR SOLUTION INTRAVENOUS at 12:41

## 2021-09-02 RX ADMIN — SODIUM CHLORIDE 100 ML/HR: 900 INJECTION, SOLUTION INTRAVENOUS at 17:33

## 2021-09-02 RX ADMIN — PROMETHAZINE HYDROCHLORIDE 25 MG: 25 INJECTION INTRAMUSCULAR; INTRAVENOUS at 13:39

## 2021-09-02 RX ADMIN — ONDANSETRON 4 MG: 2 INJECTION INTRAMUSCULAR; INTRAVENOUS at 11:30

## 2021-09-02 RX ADMIN — LORAZEPAM 1 MG: 2 INJECTION INTRAMUSCULAR; INTRAVENOUS at 18:19

## 2021-09-02 RX ADMIN — CEFTRIAXONE SODIUM 2 G: 2 INJECTION, POWDER, FOR SOLUTION INTRAMUSCULAR; INTRAVENOUS at 12:41

## 2021-09-02 RX ADMIN — ONDANSETRON 4 MG: 2 INJECTION INTRAMUSCULAR; INTRAVENOUS at 12:51

## 2021-09-02 RX ADMIN — FAMOTIDINE 20 MG: 10 INJECTION, SOLUTION INTRAVENOUS at 12:51

## 2021-09-02 RX ADMIN — FAMOTIDINE 20 MG: 10 INJECTION, SOLUTION INTRAVENOUS at 10:19

## 2021-09-02 NOTE — ED PROVIDER NOTES
EMERGENCY DEPARTMENT HISTORY AND PHYSICAL EXAM    Date: 9/2/2021  Patient Name: Luis Solis    History of Presenting Illness     Chief Complaint   Patient presents with    Abdominal Pain    Vomiting     [facility]  used as pt is non-English speaker    History Provided By: Patient    Chief Complaint: dizziness, vomiting    HPI(Context):   9:44 AM  Luis Landers is a 80 y.o. female with PMHX of HTN, HLP, DMII, anemia, CAD/MI, sick sinus syndrome, OA, thyroid, recurrent UTI, GERD, who presents to the emergency department via EMS C/O dizziness. Associated sxs include nausea and vomiting. This morning pt awoke and went to urinate when she began to feel lightheadedness. Pt vomited several times. Pt notes pain all over and does have mild cough. Pt is poor historian as  states pt keeps saying same thing in ED. Pt is vaccinated for COVID-19 per  who came to ED later in ED visit. Pt denies fever, diarrhea, chest pain, headache, and any other sxs or complaints. PCP: Soraya Willis MD    Current Facility-Administered Medications   Medication Dose Route Frequency Provider Last Rate Last Admin    cefTRIAXone (ROCEPHIN) 2 g in sterile water (preservative free) 20 mL IV syringe  2 g IntraVENous Q24H Neal Hughes PA-C   2 g at 09/02/21 1241    azithromycin (ZITHROMAX) 500 mg in 0.9% sodium chloride 250 mL (VIAL-MATE)  500 mg IntraVENous Q24H Neal Hughes PA-C   IV Completed at 09/02/21 1416    acetaminophen (TYLENOL) tablet 975 mg  975 mg Oral NOW Kamila Neely PA-C         Current Outpatient Medications   Medication Sig Dispense Refill    ondansetron (Zofran ODT) 4 mg disintegrating tablet 1 Tab by SubLINGual route every eight (8) hours as needed for Nausea or Vomiting. 20 Tab 0    cyclobenzaprine (FLEXERIL) 10 mg tablet Take 0.5 Tabs by mouth three (3) times daily as needed for Muscle Spasm(s).  40 Tab 1    oxyCODONE-acetaminophen (PERCOCET) 5-325 mg per tablet Take 1-2 Tabs by mouth every four (4) hours as needed. Max Daily Amount: 12 Tabs. 40 Tab 0    benzonatate (TESSALON) 100 mg capsule Take 100 mg by mouth three (3) times daily as needed for Cough.  cyanocobalamin (VITAMIN B-12) 1,000 mcg tablet Take 1,000 mcg by mouth daily.  hydrocortisone (ANUSOL-HC) 2.5 % rectal cream Insert  into rectum two (2) times a day.  montelukast (SINGULAIR) 10 mg tablet Take 10 mg by mouth every evening.  ondansetron hcl (ZOFRAN) 4 mg tablet Take 4 mg by mouth every eight (8) hours as needed for Nausea.  gentamicin-prednisoLONE (PRED-G) 0.3-1 % ophthalmic drops Administer 1 Drop to both eyes four (4) times daily.  albuterol (PROVENTIL HFA) 90 mcg/actuation inhaler Take 2 Puffs by inhalation every four (4) hours as needed for Wheezing.  metoprolol succinate (TOPROL-XL) 25 mg XL tablet Take 25 mg by mouth nightly.  psyllium (METAMUCIL) packet Take 1 Packet by mouth nightly.  multivitamin, tx-iron-ca-min (THERA-M W/ IRON) 9 mg iron-400 mcg tab tablet Take 1 Tab by mouth daily.  Omega-3 Fatty Acids (FISH OIL) 500 mg cap Take  by mouth daily.  telmisartan-hydroCHLOROthiazide (MICARDIS HCT) 40-12.5 mg per tablet Take 1 Tab by mouth daily.  nitrofurantoin (MACRODANTIN) 50 mg capsule Take 50 mg by mouth every six (6) hours.  ferrous sulfate (IRON) 325 mg (65 mg iron) tablet Take  by mouth Daily (before breakfast).  acetaminophen (TYLENOL) 325 mg tablet Take  by mouth every four (4) hours as needed for Pain.  sitaGLIPtin (JANUVIA) 25 mg tablet Take 25 mg by mouth daily.  levothyroxine (SYNTHROID) 25 mcg tablet Take  by mouth nightly.  Calcium Carbonate-Vit D3-Min (CALCIUM-VITAMIN D) 600-400 mg-unit Tab Take 1 Tab by mouth two (2) times a day.  pravastatin (PRAVACHOL) 40 mg tablet Take 40 mg by mouth nightly.  esomeprazole (NEXIUM) 40 mg capsule Take 40 mg by mouth daily.          Past History     Past Medical History:  Past Medical History:   Diagnosis Date    Adverse effect of anesthesia     very, very sleepy with Anesthesia    Adverse effect of anesthesia     decreased heartrate with colonoscopy    Anemia     Arthritis     osteoarthritis    CAD (coronary artery disease)     Depression     Diabetes ([facility] [address]     adult onset    Difficult intubation     went bradycardc with symptoms, had diificuly waking up for 24 hours    Foot fracture 2008    right    GERD (gastroesophageal reflux disease)     Heart attack ([facility] [address] 2005    Hypercholesterolemia     Hyperlipidemia     Hypertension     Ill-defined condition 10/2018    reddened itchy bumps on lower abdomen after steroid injections in October, itchy patient states, very red, slightly opened with yellow looking head on them.     Ill-defined condition     history of frequent uti's, especially with stress and pressure on the stomach    Ill-defined condition     history of post nasal drip    Ill-defined condition     history of frequent small BM's at night    Ill-defined condition     recurren bladder infection    Menopause     Mitral regurgitation     Myocardial infarct (HCC)     Nausea & vomiting     severe nausea    Sick sinus syndrome (HCC)     Thyroid disease        Past Surgical History:  Past Surgical History:   Procedure Laterality Date    HX CATARACT REMOVAL Right     HX GI      colonosopy    HX GYN      bartholin cyst    HX HEART CATHETERIZATION  2005    HX HEENT Bilateral     macular puckering    HX HEENT      sinus surgery    HX KNEE ARTHROSCOPY Right     HX ORTHOPAEDIC      cortisone shots to both knees    HX OTHER SURGICAL      shoulder surgery    HX OTHER SURGICAL      eye surgery    HX OTHER SURGICAL  many years ago    sinus surgery    HX OTHER SURGICAL      knee surgery    HX OTHER SURGICAL  10-22 & 10-29    Epidural steroid injection    HX SHOULDER ARTHROSCOPY Right             Family History:  Family History   Problem Relation Age of Onset    Coronary Artery Disease Mother     Anemia Neg Hx     Arrhythmia Neg Hx     Asthma Neg Hx     Clotting Disorder Neg Hx     Diabetes Neg Hx     Fainting Neg Hx     Heart Attack Neg Hx     Heart Surgery Neg Hx     High Cholesterol Neg Hx     Hypertension Neg Hx     Pacemaker Neg Hx     Sudden Death Neg Hx        Social History:  Social History     Tobacco Use    Smoking status: Never Smoker    Smokeless tobacco: Never Used   Substance Use Topics    Alcohol use: No    Drug use: No       Allergies: Allergies   Allergen Reactions    Latex, Natural Rubber Not Reported This Time    Aspirin Nausea Only    Iodinated Contrast Media Other (comments)     Reactions: splotches on skin    As per pt and spouse , pt is only allergic to red dye. Pt and spouse were questioned many times and denied allergies  Say that pt did okay with contrast dye last cath done by Dr Von Roger Other Medication Itching     All \"mycins\"    Pcn [Penicillins] Other (comments)     Reactions: splotches on skin    Sting, Bee Swelling         Review of Systems   Review of Systems   Constitutional: Positive for fatigue. Respiratory: Positive for cough. Negative for shortness of breath. Cardiovascular: Negative for chest pain. Gastrointestinal: Positive for abdominal pain, nausea and vomiting. Musculoskeletal: Positive for myalgias. Neurological: Positive for dizziness. Negative for headaches. All other systems reviewed and are negative. Physical Exam     Vitals:    09/02/21 1130 09/02/21 1245 09/02/21 1300 09/02/21 1400   BP: (!) 186/70 (!) 209/99 (!) 184/81 118/68   Pulse: 75 90 62 (!) 59   Resp: 22  18 17   Temp:       SpO2: 99% 100% 100% 99%   Weight:         Physical Exam  Vitals and nursing note reviewed. Constitutional:       General: She is not in acute distress. Appearance: She is well-developed. She is ill-appearing. She is not diaphoretic. Comments: Thin  female that appears uncomfortable. Appears sick. Moaning at times   HENT:      Head: Normocephalic and atraumatic. Right Ear: External ear normal.      Left Ear: External ear normal.      Nose: Nose normal.      Mouth/Throat:      Mouth: Mucous membranes are dry. Eyes:      General: No scleral icterus. Right eye: No discharge. Left eye: No discharge. Conjunctiva/sclera: Conjunctivae normal.   Cardiovascular:      Rate and Rhythm: Normal rate and regular rhythm. Heart sounds: Normal heart sounds. No murmur heard. No friction rub. No gallop. Pulmonary:      Effort: Pulmonary effort is normal. No tachypnea, accessory muscle usage or respiratory distress. Breath sounds: Normal breath sounds. No decreased breath sounds, wheezing, rhonchi or rales. Abdominal:      Tenderness: There is generalized abdominal tenderness. There is no guarding or rebound. Musculoskeletal:         General: Normal range of motion. Cervical back: Normal range of motion. Right lower leg: No edema. Left lower leg: No edema. Lymphadenopathy:      Cervical: No cervical adenopathy. Skin:     General: Skin is warm and dry. Neurological:      Mental Status: She is alert. Mental status is at baseline.       Comments: Pt is oriented to self and place   Psychiatric:         Behavior: Behavior normal.         Judgment: Judgment normal.             Diagnostic Study Results     Labs -     Recent Results (from the past 12 hour(s))   EKG, 12 LEAD, INITIAL    Collection Time: 09/02/21  8:35 AM   Result Value Ref Range    Ventricular Rate 69 BPM    Atrial Rate 69 BPM    P-R Interval 166 ms    QRS Duration 88 ms    Q-T Interval 452 ms    QTC Calculation (Bezet) 484 ms    Calculated P Axis 27 degrees    Calculated R Axis -34 degrees    Calculated T Axis 89 degrees    Diagnosis       Normal sinus rhythm  Possible Left atrial enlargement  Left axis deviation  Nonspecific T wave abnormality  Prolonged QT  Abnormal ECG  When compared with ECG of 23-APR-2021 12:52,  Nonspecific T wave abnormality, worse in Anterior leads     POC LACTIC ACID    Collection Time: 09/02/21  9:06 AM   Result Value Ref Range    Lactic Acid (POC) 2.69 (HH) 0.40 - 2.00 mmol/L   CBC WITH AUTOMATED DIFF    Collection Time: 09/02/21  9:45 AM   Result Value Ref Range    WBC 8.8 4.6 - 13.2 K/uL    RBC 4.83 4.20 - 5.30 M/uL    HGB 12.7 12.0 - 16.0 g/dL    HCT 38.9 35.0 - 45.0 %    MCV 80.5 78.0 - 100.0 FL    MCH 26.3 24.0 - 34.0 PG    MCHC 32.6 31.0 - 37.0 g/dL    RDW 16.1 (H) 11.6 - 14.5 %    PLATELET 528 924 - 721 K/uL    MPV 11.1 9.2 - 11.8 FL    NEUTROPHILS 77 (H) 40 - 73 %    LYMPHOCYTES 14 (L) 21 - 52 %    MONOCYTES 7 3 - 10 %    EOSINOPHILS 1 0 - 5 %    BASOPHILS 0 0 - 2 %    ABS. NEUTROPHILS 6.8 1.8 - 8.0 K/UL    ABS. LYMPHOCYTES 1.2 0.9 - 3.6 K/UL    ABS. MONOCYTES 0.6 0.05 - 1.2 K/UL    ABS. EOSINOPHILS 0.1 0.0 - 0.4 K/UL    ABS. BASOPHILS 0.0 0.0 - 0.1 K/UL    DF AUTOMATED     CARDIAC PANEL,(CK, CKMB & TROPONIN)    Collection Time: 09/02/21  9:45 AM   Result Value Ref Range    CK - MB 2.6 <3.6 ng/ml    CK-MB Index 4.5 (H) 0.0 - 4.0 %    CK 58 26 - 192 U/L    Troponin-I, QT <0.02 0.0 - 4.027 NG/ML   METABOLIC PANEL, COMPREHENSIVE    Collection Time: 09/02/21  9:45 AM   Result Value Ref Range    Sodium 140 136 - 145 mmol/L    Potassium 3.9 3.5 - 5.5 mmol/L    Chloride 107 100 - 111 mmol/L    CO2 23 21 - 32 mmol/L    Anion gap 10 3.0 - 18 mmol/L    Glucose 195 (H) 74 - 99 mg/dL    BUN 42 (H) 7.0 - 18 MG/DL    Creatinine 1.35 (H) 0.6 - 1.3 MG/DL    BUN/Creatinine ratio 31 (H) 12 - 20      GFR est AA 45 (L) >60 ml/min/1.73m2    GFR est non-AA 37 (L) >60 ml/min/1.73m2    Calcium 9.5 8.5 - 10.1 MG/DL    Bilirubin, total 0.6 0.2 - 1.0 MG/DL    ALT (SGPT) 22 13 - 56 U/L    AST (SGOT) 27 10 - 38 U/L    Alk.  phosphatase 88 45 - 117 U/L    Protein, total 7.3 6.4 - 8.2 g/dL    Albumin 3.5 3.4 - 5.0 g/dL    Globulin 3.8 2.0 - 4.0 g/dL    A-G Ratio 0.9 0.8 - 1.7     LIPASE Collection Time: 09/02/21  9:45 AM   Result Value Ref Range    Lipase 145 73 - 393 U/L   MAGNESIUM    Collection Time: 09/02/21  9:45 AM   Result Value Ref Range    Magnesium 2.0 1.6 - 2.6 mg/dL   PROTHROMBIN TIME + INR    Collection Time: 09/02/21  9:45 AM   Result Value Ref Range    Prothrombin time 12.7 11.5 - 15.2 sec    INR 1.0 0.8 - 1.2     PTT    Collection Time: 09/02/21  9:45 AM   Result Value Ref Range    aPTT 27.8 23.0 - 36.4 SEC   TSH 3RD GENERATION    Collection Time: 09/02/21  9:45 AM   Result Value Ref Range    TSH 3.31 0.36 - 3.74 uIU/mL   URINALYSIS W/ RFLX MICROSCOPIC    Collection Time: 09/02/21 10:34 AM   Result Value Ref Range    Color YELLOW      Appearance CLOUDY      Specific gravity 1.010 1.005 - 1.030      pH (UA) 8.0 5.0 - 8.0      Protein 100 (A) NEG mg/dL    Glucose 100 (A) NEG mg/dL    Ketone Negative NEG mg/dL    Bilirubin Negative NEG      Blood SMALL (A) NEG      Urobilinogen 0.2 0.2 - 1.0 EU/dL    Nitrites Negative NEG      Leukocyte Esterase TRACE (A) NEG     COVID-19 RAPID TEST    Collection Time: 09/02/21 10:34 AM   Result Value Ref Range    Specimen source Nasopharyngeal      COVID-19 rapid test Not detected NOTD     URINE MICROSCOPIC ONLY    Collection Time: 09/02/21 10:34 AM   Result Value Ref Range    WBC 0 to 3 0 - 5 /hpf    RBC 0 to 3 0 - 5 /hpf    Epithelial cells 3+ 0 - 5 /lpf    Bacteria 1+ (A) NEG /hpf   POC LACTIC ACID    Collection Time: 09/02/21  1:18 PM   Result Value Ref Range    Lactic Acid (POC) 1.00 0.40 - 2.00 mmol/L       CT CHEST ABD PELV WO CONT   Final Result      1. No acute intra-abdominal abnormality. No evidence for bowel obstruction. Small hiatal hernia. 2. Scattered areas of pulmonary interstitial and groundglass opacities within   both lower lobes, may represent infectious process, atelectasis or sequela of   edema. Mild cardiomegaly. * **  *      CT HEAD WO CONT   Final Result      1. No acute intracranial abnormalities.       2. Diffuse atrophy with mild bilateral white matter disease, although   nonspecific, likely represents chronic small vessel ischemic changes. *   *      XR CHEST PORT   Final Result   Mild hypoinflation. Suggestion of developing retrocardiac, left   lower lobe consolidation, atelectasis or pneumonia. *   ** *   **        CT Results  (Last 48 hours)               09/02/21 1100  CT CHEST ABD PELV WO CONT Final result    Impression:      1. No acute intra-abdominal abnormality. No evidence for bowel obstruction. Small hiatal hernia. 2. Scattered areas of pulmonary interstitial and groundglass opacities within   both lower lobes, may represent infectious process, atelectasis or sequela of   edema. Mild cardiomegaly. * **  *       Narrative:  EXAM: CT of the chest, abdomen, and pelvis       CLINICAL INDICATION/HISTORY: nausea, vomiting, contrast allergy     > Additional: Abdominal pain. COMPARISON: CT abdomen pelvis without contrast 04/23/2021     > Reference Exam: None. TECHNIQUE: Axial CT imaging of the chest, abdomen, and pelvis was performed   without intravenous contrast. Oral contrast was not administered. Multiplanar   reformats were generated. One or more dose reduction techniques were used on   this CT: automated exposure control, adjustment of the mAs and/or kVp according   to patient size, and iterative reconstruction techniques. The specific   techniques used on this CT exam have been documented in the patient's electronic   medical record. Digital Imaging and Communications in Medicine (DICOM) format   image data are available to nonaffiliated external healthcare facilities or   entities on a secure, media free, reciprocally searchable basis with patient   authorization for at least a 12-month period after this study.            _______________       FINDINGS:       CHEST:       LUNGS/AIRWAY: Stable chronic interstitial disease and bronchiectasis within the   anterior aspect of the right upper lobe. Scattered areas of interstitial and   groundglass opacities within both lower lobes. No dense consolidation. PLEURA: No effusion or pneumothorax. MEDIASTINUM: Heart is mildly enlarged. No pericardial effusion. Small hiatal   hernia. LYMPH NODES: No enlarged lymph nodes.       ===============       ABDOMEN/PELVIS:       LIVER, BILIARY: Liver is unremarkable. No biliary dilation. Gallbladder is   unremarkable. PANCREAS: Unremarkable. SPLEEN: Unremarkable. ADRENALS: Unremarkable. KIDNEYS/URETERS/BLADDER: No hydronephrosis or hydroureter. No renal or ureteral   calculi. Bladder is mildly distended. LYMPH NODES: No enlarged lymph nodes. GASTROINTESTINAL TRACT: No bowel dilation or wall thickening. Colonic   diverticulosis. PELVIC ORGANS: Atrophic uterus versus hysterectomy. VASCULATURE: Mild to moderate aortoiliac atherosclerosis. BONES: No acute abnormality. Multi level thoracic and lumbar spondylosis. Grade   I anterolisthesis of L5 on S1, likely on degenerative basis. Minimal   retrolisthesis of L2 on L3 and L3 on L4.       OTHER: No ascites. _______________           09/02/21 1054  CT HEAD WO CONT Final result    Impression:      1. No acute intracranial abnormalities. 2.  Diffuse atrophy with mild bilateral white matter disease, although   nonspecific, likely represents chronic small vessel ischemic changes. *   *       Narrative:  EXAM: CT head       CLINICAL INDICATION/HISTORY: dizziness     > Additional: None. COMPARISON: 04/23/2021     > Reference Exam: None. TECHNIQUE: Axial CT imaging of the head was performed without intravenous   contrast. Sagittal and coronal reconstructions were performed.   One or more dose   reduction techniques were used on this CT: automated exposure control,   adjustment of the mAs and/or kVp according to patient size, and iterative   reconstruction techniques. The specific techniques used on this CT exam have   been documented in the patient's electronic medical record. Digital Imaging and   Communications in Medicine (DICOM) format image data are available to   nonaffiliated external healthcare facilities or entities on a secure, media   free, reciprocally searchable basis with patient authorization for at least a   12-month period after this study. _______________       FINDINGS:       General comment: Mildly limited due to motion. BRAIN AND POSTERIOR FOSSA: Diffuse atrophy There is no intracranial hemorrhage,   mass effect, or midline shift. Mild low attenuation within the periventricular,   subcortical and deep white matter bilaterally. EXTRA-AXIAL SPACES AND MENINGES: There are no abnormal extra-axial fluid   collections. CALVARIUM: Intact. SINUSES: Clear. OTHER: Bilateral aphakia.       _______________               CXR Results  (Last 48 hours)               09/02/21 0952  XR CHEST PORT Final result    Impression:  Mild hypoinflation. Suggestion of developing retrocardiac, left   lower lobe consolidation, atelectasis or pneumonia. *   ** *   **       Narrative:  EXAM:  PORTABLE CHEST       INDICATION:  Shortness of breath. TECHNIQUE:  Portable, AP view. COMPARISON:  PA view 06/17/2018       ____________________       FINDINGS:         SUPPORT DEVICES: None. HEART AND MEDIASTINUM: Cardiac silhouette is mildly enlarged. Atherosclerotic   thoracic aorta. LUNGS AND PLEURAL SPACES: Lungs are mildly hypoinflated. Suggestion of   developing retrocardiac, left lower lobe opacity. No pleural effusion or   pneumothorax. BONY THORAX AND SOFT TISSUES: No acute osseous abnormality. Degenerative   changes around the visualized shoulders.        ____________________                 Medications given in the ED-  Medications   cefTRIAXone (ROCEPHIN) 2 g in sterile water (preservative free) 20 mL IV syringe (2 g IntraVENous Given 9/2/21 1241)   azithromycin (ZITHROMAX) 500 mg in 0.9% sodium chloride 250 mL (VIAL-MATE) (0 mg IntraVENous IV Completed 9/2/21 1416)   acetaminophen (TYLENOL) tablet 975 mg (0 mg Oral Held 9/2/21 1240)   ondansetron (ZOFRAN) injection 4 mg (4 mg IntraVENous Given 9/2/21 1019)   famotidine (PF) (PEPCID) injection 20 mg (20 mg IntraVENous Given 9/2/21 1019)   sodium chloride 0.9 % bolus infusion 1,000 mL (0 mL IntraVENous IV Completed 9/2/21 1134)   morphine injection 4 mg (4 mg IntraVENous Given 9/2/21 1131)   ondansetron (ZOFRAN) injection 4 mg (4 mg IntraVENous Given 9/2/21 1130)   dexamethasone (DECADRON) 4 mg/mL injection 10 mg (10 mg IntraVENous Given 9/2/21 1240)   famotidine (PF) (PEPCID) injection 20 mg (20 mg IntraVENous Given 9/2/21 1251)   labetaloL (NORMODYNE;TRANDATE) 20 mg/4 mL (5 mg/mL) injection 10 mg (10 mg IntraVENous Given 9/2/21 1251)   ondansetron (ZOFRAN) injection 4 mg (4 mg IntraVENous Given 9/2/21 1251)   promethazine (PHENERGAN) 25 mg in 0.9% sodium chloride 50 mL IVPB (0 mg IntraVENous IV Completed 9/2/21 1402)         Medical Decision Making   I am the first provider for this patient. I reviewed the vital signs, available nursing notes, past medical history, past surgical history, family history and social history. Vital Signs-Reviewed the patient's vital signs. Pulse Oximetry Analysis - 99% on RA/ NORMAL     Records Reviewed: Nursing Notes and Old Medical Records    Provider Notes (Medical Decision Making): dehydration, hypoglycemia, DKA/HHS, metabolic derangement, viral, COVID, gastroenteritis, pancreatitis,gastritis, colitis, diverticulitis, UTI. Doubt ACS/MI    Procedures:  Procedures    ED Course:   9:44 AM Initial assessment performed. The patients presenting problems have been discussed, and they are in agreement with the care plan formulated and outlined with them.   I have encouraged them to ask questions as they arise throughout their visit.    Diagnosis and Disposition       Consult: I discussed care with [place] Pl[place], MD It was a standard discussion, including history of patients chief complaint, available diagnostic results, and treatment course. Will admit to medical for CAP with suspected COVID-19 PNA despite negative rapid SARS-COV-2 in ED. 1. Community acquired pneumonia, unspecified laterality    2. Suspected COVID-19 virus infection        PLAN:  1. ADMIT to medical  _______________________________    Attestations: This note is prepared by Farzana Norris PA-C.  _______________________________      Please note that this dictation was completed with [employer], the computer voice recognition software. Quite often unanticipated grammatical, syntax, homophones, and other interpretive errors are inadvertently transcribed by the computer software. Please disregard these errors. Please excuse any errors that have escaped final proofreading.

## 2021-09-02 NOTE — H&P
History & Physical    Patient: Neo Poole MRN: [id number]  CSN: [id number]    YOB: 1932  Age: 80 y.o. Sex: female      DOA: 9/2/2021  Primary Care Provider:  Dian Mann MD      Assessment/Plan     Patient Active Problem List   Diagnosis Code    Hypertension I10    Diabetes ([address] E11.9    Fatigue R53.83    Hyperlipidemia E78.5    Dizziness R44    H/O myocardial infarction, greater than 8 weeks I25.2    Back pain M54.9    Aortic valve disorders I35.9    Lumbar spinal stenosis M48.061    Lumbar spondylosis M47.816    Radiculopathy of leg M54.10    CAP (community acquired pneumonia) J18.9    COVID-19 vaccine series completed Z92.29    Suspected COVID-19 virus infection Z20.822    CAD (coronary artery disease) I25.10    Dementia ([address] F03.90    Delirium R41.0       Admit to medical floor    Community-acquired pneumonia-  Continue with ceftriaxone and azithromycin    Concern for aspiration versus COVID-19  CT scan showed scattered areas of pulmonary interstitial and groundglass opacities within both lower lobes, may represent infectious process, atelectasis or sequela of edema. Rapid Covid negative  Follow viral PCR    CAD-  Continue with beta-blocker and statin    Hypertension-  Continue with home medication, continue on telmisartan and hydrochlorothiazide, monitor blood pressure. DM-  Hold oral hypoglycemics, start on sliding scale insulin and diabetic diet hypothyroidism-  Continue Synthroid  TSH in normal range    DVT prophylaxis with heparin    PT/OT     discussed with , patient is DNR. Palliative care consult    Estimated length of stay : 2 to 3 days    CC:dizziness, vomiting. HPI:     Neo Poole is a 80 y.o. female with diabetes, hypertension, CAD, hypothyroidism, hyperlipidemia, GERD presents to ER with concerns of dizziness.   Patient speaks [place], per  she woke up this morning and went to urinate when she felt lightheadedness and dizzy.  Subsequent to that she vomited several times. Other symptoms include generalized malaise body pains, cough. She denies any fever, chills, chest pain, shortness of breath, diarrhea, constipation, headache. In ER her white count in normal range, her lactic acid 2.69 her BUNs/creatinine at 42/1. 35.  CT head with no acute findings, CT chest abdomen pelvis showed no acute intraabdominal findings no evidence of bowel obstruction, small hiatal hernia. Scattered areas of pulmonary interstitial and groundglass opacities within both lower lobes, may represent infectious process, atelectasis or sequela of edema. Mild cardiomegaly. Past Medical History:   Diagnosis Date    Adverse effect of anesthesia     very, very sleepy with Anesthesia    Adverse effect of anesthesia     decreased heartrate with colonoscopy    Anemia     Arthritis     osteoarthritis    CAD (coronary artery disease)     Depression     Diabetes ([address]     adult onset    Difficult intubation     went bradycardc with symptoms, had diificuly waking up for 24 hours    Foot fracture 2008    right    GERD (gastroesophageal reflux disease)     Heart attack ([address] 2005    Hypercholesterolemia     Hyperlipidemia     Hypertension     Ill-defined condition 10/2018    reddened itchy bumps on lower abdomen after steroid injections in October, itchy patient states, very red, slightly opened with yellow looking head on them.     Ill-defined condition     history of frequent uti's, especially with stress and pressure on the stomach    Ill-defined condition     history of post nasal drip    Ill-defined condition     history of frequent small BM's at night    Ill-defined condition     recurren bladder infection    Menopause     Mitral regurgitation     Myocardial infarct (HCC)     Nausea & vomiting     severe nausea    Sick sinus syndrome ([address]     Thyroid disease        Past Surgical History:   Procedure Laterality Date    HX CATARACT REMOVAL Right     HX GI      colonosopy    HX GYN      bartholin cyst    HX HEART CATHETERIZATION  2005    HX HEENT Bilateral     macular puckering    HX HEENT      sinus surgery    HX KNEE ARTHROSCOPY Right     HX ORTHOPAEDIC      cortisone shots to both knees    HX OTHER SURGICAL      shoulder surgery    HX OTHER SURGICAL      eye surgery    HX OTHER SURGICAL  many years ago    sinus surgery    HX OTHER SURGICAL      knee surgery    HX OTHER SURGICAL  10-22 & 10-29    Epidural steroid injection    HX SHOULDER ARTHROSCOPY Right             Family History   Problem Relation Age of Onset    Coronary Artery Disease Mother     Anemia Neg Hx     Arrhythmia Neg Hx     Asthma Neg Hx     Clotting Disorder Neg Hx     Diabetes Neg Hx     Fainting Neg Hx     Heart Attack Neg Hx     Heart Surgery Neg Hx     High Cholesterol Neg Hx     Hypertension Neg Hx     Pacemaker Neg Hx     Sudden Death Neg Hx        Social History     Socioeconomic History    Marital status:      Spouse name: Not on file    Number of children: Not on file    Years of education: Not on file    Highest education level: Not on file   Tobacco Use    Smoking status: Never Smoker    Smokeless tobacco: Never Used   Substance and Sexual Activity    Alcohol use: No    Drug use: No     Social Determinants of Health     Financial Resource Strain:     Difficulty of Paying Living Expenses:    Food Insecurity:     Worried About Running Out of Food in the Last Year:     Ran Out of Food in the Last Year:    Transportation Needs:     Lack of Transportation (Medical):      Lack of Transportation (Non-Medical):    Physical Activity:     Days of Exercise per Week:     Minutes of Exercise per Session:    Stress:     Feeling of Stress :    Social Connections:     Frequency of Communication with Friends and Family:     Frequency of Social Gatherings with Friends and Family:     Attends [employer] Services:     Active Member of Clubs or Organizations:     Attends Club or Organization Meetings:     Marital Status:        Prior to Admission medications    Medication Sig Start Date End Date Taking? Authorizing Provider   ondansetron (Zofran ODT) 4 mg disintegrating tablet 1 Tab by SubLINGual route every eight (8) hours as needed for Nausea or Vomiting. 4/23/21   Gerry Syed DO   cyclobenzaprine (FLEXERIL) 10 mg tablet Take 0.5 Tabs by mouth three (3) times daily as needed for Muscle Spasm(s). 1/23/19   Asia James MD   oxyCODONE-acetaminophen (PERCOCET) 5-325 mg per tablet Take 1-2 Tabs by mouth every four (4) hours as needed. Max Daily Amount: 12 Tabs. 1/23/19   Channing Wolfe MD   benzonatate (TESSALON) 100 mg capsule Take 100 mg by mouth three (3) times daily as needed for Cough. Provider, Historical   cyanocobalamin (VITAMIN B-12) 1,000 mcg tablet Take 1,000 mcg by mouth daily. Provider, Historical   hydrocortisone (ANUSOL-HC) 2.5 % rectal cream Insert  into rectum two (2) times a day. Provider, Historical   montelukast (SINGULAIR) 10 mg tablet Take 10 mg by mouth every evening. Provider, Historical   ondansetron hcl (ZOFRAN) 4 mg tablet Take 4 mg by mouth every eight (8) hours as needed for Nausea. Provider, Historical   gentamicin-prednisoLONE (PRED-G) 0.3-1 % ophthalmic drops Administer 1 Drop to both eyes four (4) times daily. Provider, Historical   albuterol (PROVENTIL HFA) 90 mcg/actuation inhaler Take 2 Puffs by inhalation every four (4) hours as needed for Wheezing. Provider, Historical   metoprolol succinate (TOPROL-XL) 25 mg XL tablet Take 25 mg by mouth nightly. Provider, Historical   psyllium (METAMUCIL) packet Take 1 Packet by mouth nightly. Provider, Historical   multivitamin, tx-iron-ca-min (THERA-M W/ IRON) 9 mg iron-400 mcg tab tablet Take 1 Tab by mouth daily. Provider, Historical   Omega-3 Fatty Acids (FISH OIL) 500 mg cap Take  by mouth daily.     Provider, Historical   telmisartan-hydroCHLOROthiazide (MICARDIS HCT) 40-12.5 mg per tablet Take 1 Tab by mouth daily. Provider, Historical   nitrofurantoin (MACRODANTIN) 50 mg capsule Take 50 mg by mouth every six (6) hours. Provider, Historical   ferrous sulfate (IRON) 325 mg (65 mg iron) tablet Take  by mouth Daily (before breakfast). Other, MD Salas   acetaminophen (TYLENOL) 325 mg tablet Take  by mouth every four (4) hours as needed for Pain. Other, MD Salas   sitaGLIPtin (JANUVIA) 25 mg tablet Take 25 mg by mouth daily. Other, MD Salas   levothyroxine (SYNTHROID) 25 mcg tablet Take  by mouth nightly. Provider, Historical   Calcium Carbonate-Vit D3-Min (CALCIUM-VITAMIN D) 600-400 mg-unit Tab Take 1 Tab by mouth two (2) times a day. Provider, Historical   pravastatin (PRAVACHOL) 40 mg tablet Take 40 mg by mouth nightly. Provider, Historical   esomeprazole (NEXIUM) 40 mg capsule Take 40 mg by mouth daily. Provider, Historical       Allergies   Allergen Reactions    Latex, Natural Rubber Not Reported This Time    Aspirin Nausea Only    Iodinated Contrast Media Other (comments)     Reactions: splotches on skin    As per pt and spouse , pt is only allergic to red dye. Pt and spouse were questioned many times and denied allergies  Say that pt did okay with contrast dye last cath done by Dr Von Roger Other Medication Itching     All \"mycins\"    Pcn [Penicillins] Other (comments)     Reactions: splotches on skin    Sting, Bee Swelling       Review of Systems  Gen: No fever, chills, malaise, weight loss/gain. Heent: No headache, rhinorrhea, epistaxis, ear pain, hearing loss, sinus pain, neck pain/stiffness, sore throat. Heart: No chest pain, palpitations, ABBASI, pnd, or orthopnea. Resp: See above  GI: No nausea, vomiting, diarrhea, constipation, melena or hematochezia. : No urinary obstruction, dysuria or hematuria. Derm: No rash, new skin lesion or pruritis.    Musc/skeletal: no bone or joint complains. Vasc: No edema, cyanosis or claudication. Endo: No heat/cold intolerance, no polyuria,polydipsia or polyphagia. Neuro: No unilateral weakness, numbness, tingling. No seizures. Heme: No easy bruising or bleeding. Physical Exam:     Physical Exam:  Visit Vitals  BP (!) 138/54   Pulse 64   Temp 97.7 °F (36.5 °C)   Resp 17   Wt 45.4 kg (100 lb)   SpO2 96%   BMI 22.42 kg/m²      O2 Device: None (Room air)    Temp (24hrs), Av.7 °F (36.5 °C), Min:97.7 °F (36.5 °C), Max:97.7 °F (36.5 °C)    701 -  1900  In: 1300 [I.V.:1300]  Out: -    No intake/output data recorded. General:  Awake, demented. Head:  Normocephalic, without obvious abnormality, atraumatic. Eyes:  Conjunctivae/corneas clear, sclera anicteric, PERRL, EOMs intact. Nose: Nares normal. No drainage or sinus tenderness. Throat: Lips, mucosa, and tongue normal.    Neck: Supple, symmetrical, trachea midline, no adenopathy. Lungs:    Rales lower lung bilaterally. Heart:   S1, S2, no murmur, click, rub or gallop. Abdomen: Soft, non-tender. Bowel sounds normal. No masses,  No organomegaly. Extremities: Extremities normal, atraumatic, no cyanosis or edema. Capillary refill normal.   Pulses: 2+ and symmetric all extremities. Skin: Skin color pink, turgor normal. No rashes or lesions   Neurologic: CNII-XII intact. No focal motor or sensory deficit.        Labs Reviewed:    CMP:   Lab Results   Component Value Date/Time     2021 09:45 AM    K 3.9 2021 09:45 AM     2021 09:45 AM    CO2 23 2021 09:45 AM    AGAP 10 2021 09:45 AM     (H) 2021 09:45 AM    BUN 42 (H) 2021 09:45 AM    CREA 1.35 (H) 2021 09:45 AM    GFRAA 45 (L) 2021 09:45 AM    GFRNA 37 (L) 2021 09:45 AM    CA 9.5 2021 09:45 AM    MG 2.0 2021 09:45 AM    ALB 3.5 2021 09:45 AM    TP 7.3 2021 09:45 AM    GLOB 3.8 2021 09:45 AM AGRAT 0.9 09/02/2021 09:45 AM    ALT 22 09/02/2021 09:45 AM     CBC:   Lab Results   Component Value Date/Time    WBC 8.8 09/02/2021 09:45 AM    HGB 12.7 09/02/2021 09:45 AM    HCT 38.9 09/02/2021 09:45 AM     09/02/2021 09:45 AM     All Cardiac Markers in the last 24 hours:   Lab Results   Component Value Date/Time    CPK 58 09/02/2021 09:45 AM    CKMB 2.6 09/02/2021 09:45 AM    CKND1 4.5 (H) 09/02/2021 09:45 AM    TROIQ <0.02 09/02/2021 09:45 AM         Procedures/imaging: see electronic medical records for all procedures/Xrays and details which were not copied into this note but were reviewed prior to creation of Plan    Please note that this dictation was completed with [employer], the [employer] voice recognition software. Quite often unanticipated grammatical, syntax, homophones, and other interpretive errors are inadvertently transcribed by the computer software. Please disregard these errors. Please excuse any errors that have escaped final proofreading.         CC: Sharon Sierra MD

## 2021-09-03 ENCOUNTER — APPOINTMENT (OUTPATIENT)
Dept: MRI IMAGING | Age: 86
DRG: 194 | End: 2021-09-03
Attending: HOSPITALIST
Payer: MEDICARE

## 2021-09-03 LAB
ANION GAP SERPL CALC-SCNC: 9 MMOL/L (ref 3–18)
BACTERIA SPEC CULT: NORMAL
BUN SERPL-MCNC: 36 MG/DL (ref 7–18)
BUN/CREAT SERPL: 31 (ref 12–20)
CALCIUM SERPL-MCNC: 8.2 MG/DL (ref 8.5–10.1)
CHLORIDE SERPL-SCNC: 113 MMOL/L (ref 100–111)
CO2 SERPL-SCNC: 22 MMOL/L (ref 21–32)
CREAT SERPL-MCNC: 1.18 MG/DL (ref 0.6–1.3)
ERYTHROCYTE [DISTWIDTH] IN BLOOD BY AUTOMATED COUNT: 16.2 % (ref 11.6–14.5)
GLUCOSE BLD STRIP.AUTO-MCNC: 122 MG/DL (ref 70–110)
GLUCOSE BLD STRIP.AUTO-MCNC: 221 MG/DL (ref 70–110)
GLUCOSE BLD STRIP.AUTO-MCNC: 77 MG/DL (ref 70–110)
GLUCOSE BLD STRIP.AUTO-MCNC: 85 MG/DL (ref 70–110)
GLUCOSE SERPL-MCNC: 87 MG/DL (ref 74–99)
HCT VFR BLD AUTO: 35 % (ref 35–45)
HGB BLD-MCNC: 11.4 G/DL (ref 12–16)
MCH RBC QN AUTO: 26.1 PG (ref 24–34)
MCHC RBC AUTO-ENTMCNC: 32.6 G/DL (ref 31–37)
MCV RBC AUTO: 80.3 FL (ref 78–100)
PLATELET # BLD AUTO: 244 K/UL (ref 135–420)
PMV BLD AUTO: 10.7 FL (ref 9.2–11.8)
POTASSIUM SERPL-SCNC: 4.1 MMOL/L (ref 3.5–5.5)
RBC # BLD AUTO: 4.36 M/UL (ref 4.2–5.3)
SERVICE CMNT-IMP: NORMAL
SODIUM SERPL-SCNC: 144 MMOL/L (ref 136–145)
WBC # BLD AUTO: 7.8 K/UL (ref 4.6–13.2)

## 2021-09-03 PROCEDURE — 65270000029 HC RM PRIVATE

## 2021-09-03 PROCEDURE — 74011250636 HC RX REV CODE- 250/636: Performed by: HOSPITALIST

## 2021-09-03 PROCEDURE — 74011636637 HC RX REV CODE- 636/637: Performed by: HOSPITALIST

## 2021-09-03 PROCEDURE — 36415 COLL VENOUS BLD VENIPUNCTURE: CPT

## 2021-09-03 PROCEDURE — 80048 BASIC METABOLIC PNL TOTAL CA: CPT

## 2021-09-03 PROCEDURE — 76450000000

## 2021-09-03 PROCEDURE — 97162 PT EVAL MOD COMPLEX 30 MIN: CPT

## 2021-09-03 PROCEDURE — 74011000250 HC RX REV CODE- 250: Performed by: PHYSICIAN ASSISTANT

## 2021-09-03 PROCEDURE — 74011250637 HC RX REV CODE- 250/637: Performed by: HOSPITALIST

## 2021-09-03 PROCEDURE — 82962 GLUCOSE BLOOD TEST: CPT

## 2021-09-03 PROCEDURE — 74011250636 HC RX REV CODE- 250/636: Performed by: PHYSICIAN ASSISTANT

## 2021-09-03 PROCEDURE — 70551 MRI BRAIN STEM W/O DYE: CPT

## 2021-09-03 PROCEDURE — 85027 COMPLETE CBC AUTOMATED: CPT

## 2021-09-03 PROCEDURE — 92610 EVALUATE SWALLOWING FUNCTION: CPT

## 2021-09-03 RX ADMIN — HEPARIN SODIUM 5000 UNITS: 5000 INJECTION INTRAVENOUS; SUBCUTANEOUS at 18:57

## 2021-09-03 RX ADMIN — HEPARIN SODIUM 5000 UNITS: 5000 INJECTION INTRAVENOUS; SUBCUTANEOUS at 01:04

## 2021-09-03 RX ADMIN — INSULIN LISPRO 4 UNITS: 100 INJECTION, SOLUTION INTRAVENOUS; SUBCUTANEOUS at 21:21

## 2021-09-03 RX ADMIN — LEVOTHYROXINE SODIUM 25 MCG: 0.03 TABLET ORAL at 21:19

## 2021-09-03 RX ADMIN — MULTIPLE VITAMINS W/ MINERALS TAB 1 TABLET: TAB at 09:46

## 2021-09-03 RX ADMIN — PRAVASTATIN SODIUM 40 MG: 20 TABLET ORAL at 21:19

## 2021-09-03 RX ADMIN — HEPARIN SODIUM 5000 UNITS: 5000 INJECTION INTRAVENOUS; SUBCUTANEOUS at 09:46

## 2021-09-03 RX ADMIN — TELMISARTAN 40 MG: 40 TABLET ORAL at 09:46

## 2021-09-03 RX ADMIN — SODIUM CHLORIDE 100 ML/HR: 900 INJECTION, SOLUTION INTRAVENOUS at 06:40

## 2021-09-03 RX ADMIN — MONTELUKAST 10 MG: 10 TABLET, FILM COATED ORAL at 18:56

## 2021-09-03 RX ADMIN — AZITHROMYCIN MONOHYDRATE 500 MG: 500 INJECTION, POWDER, LYOPHILIZED, FOR SOLUTION INTRAVENOUS at 13:35

## 2021-09-03 RX ADMIN — METOPROLOL SUCCINATE 25 MG: 25 TABLET, EXTENDED RELEASE ORAL at 01:04

## 2021-09-03 RX ADMIN — HYDROCHLOROTHIAZIDE 12.5 MG: 25 TABLET ORAL at 09:46

## 2021-09-03 RX ADMIN — MONTELUKAST 10 MG: 10 TABLET, FILM COATED ORAL at 14:00

## 2021-09-03 RX ADMIN — OMEGA-3-ACID ETHYL ESTERS 1 CAPSULE: 1 CAPSULE, LIQUID FILLED ORAL at 09:46

## 2021-09-03 RX ADMIN — CEFTRIAXONE SODIUM 2 G: 2 INJECTION, POWDER, FOR SOLUTION INTRAMUSCULAR; INTRAVENOUS at 13:35

## 2021-09-03 RX ADMIN — METOPROLOL SUCCINATE 25 MG: 25 TABLET, EXTENDED RELEASE ORAL at 21:19

## 2021-09-03 RX ADMIN — LEVOTHYROXINE SODIUM 25 MCG: 0.03 TABLET ORAL at 01:04

## 2021-09-03 RX ADMIN — PRAVASTATIN SODIUM 40 MG: 20 TABLET ORAL at 01:04

## 2021-09-03 NOTE — PROGRESS NOTES
Problem: Falls - Risk of  Goal: *Absence of Falls  Description: Document Danielle Augustin Fall Risk and appropriate interventions in the flowsheet. Outcome: Progressing Towards Goal  Note: Fall Risk Interventions:  Mobility Interventions: Utilize walker, cane, or other assistive device, Bed/chair exit alarm    Mentation Interventions: Adequate sleep, hydration, pain control, Bed/chair exit alarm, Door open when patient unattended, More frequent rounding, Room close to nurse's station    Medication Interventions: Bed/chair exit alarm    Elimination Interventions:  Toileting schedule/hourly rounds

## 2021-09-03 NOTE — PROGRESS NOTES
1900-Bedside and Verbal shift change report given to [address] [facility] (oncoming nurse) by [place] RN(offgoing nurse). Report included the following information SBAR, Kardex, MAR and Med Rec Status.

## 2021-09-03 NOTE — ROUTINE PROCESS
Bedside and Verbal shift change report given to [facility] RN (oncoming nurse) by Ben Santos (offgoing nurse). Report included the following information SBAR, Kardex, MAR and Recent Results.

## 2021-09-03 NOTE — PROGRESS NOTES
Reason for Admission:  dizziness, vomiting                     RUR Score:   Low; 17%                  Plan for utilizing home health:    N/A      PCP: First and Last name:  Ashley Sharp MD     Name of Practice:    Are you a current patient: Yes/No:    Approximate date of last visit:    Can you participate in a virtual visit with your PCP:                     Current Advanced Directive/Advance Care Plan: DNR      Healthcare Decision Maker:   Click here to complete [address] including selection of the Healthcare Decision Maker Relationship (ie \"Primary\")             Primary Decision Maker: Kervin Solis - Spouse - [phone]                  Transition of Care Plan:    SNF                  Chart reviewed. Per H&P \" Luis Cuellar is a 80 y.o. female with diabetes, hypertension, CAD, hypothyroidism, hyperlipidemia, GERD presents to ER with concerns of dizziness. Patient speaks [place], per  she woke up this morning and went to urinate when she felt lightheadedness and dizzy. Subsequent to that she vomited several times. Other symptoms include generalized malaise body pains, cough. She denies any fever, chills, chest pain, shortness of breath, diarrhea, constipation, headache. In ER her white count in normal range, her lactic acid 2.69 her BUNs/creatinine at 42/1. 35.  CT head with no acute findings, CT chest abdomen pelvis showed no acute intraabdominal findings no evidence of bowel obstruction, small hiatal hernia. Scattered areas of pulmonary interstitial and groundglass opacities within both lower lobes, may represent infectious process, atelectasis or sequela of edema. Mild cardiomegaly. \"    CM met with pt and her  to discuss transition of care. Pt is  and uses a cane to ambulate. CM discussed transition of care options. Pt/family would like SNF. CM provided pt/family with a list of area SNF to review.   Pt/family would like to have clinical sent to [address] [place] [address] as a first choice and [facility] as a second choice. CM requested family continue to look a the list to provide additional choices if needed.   CM to continue to follow and assist.    Care Management Interventions  Mode of Transport at Discharge: BLS  Transition of Care Consult (CM Consult): SNF, Discharge Planning  Health Maintenance Reviewed: Yes  Physical Therapy Consult: Yes  Occupational Therapy Consult: Yes  Speech Therapy Consult: Yes  Current Support Network: Lives with Spouse  The Plan for Transition of Care is Related to the Following Treatment Goals : SNF  The Patient and/or Patient Representative was Provided with a Choice of Provider and Agrees with the Discharge Plan?: Yes  Name of the Patient Representative Who was Provided with a Choice of Provider and Agrees with the Discharge Plan: pt/spouse  Freedom of Choice List was Provided with Basic Dialogue that Supports the Patient's Individualized Plan of Care/Goals, Treatment Preferences and Shares the Quality Data Associated with the Providers?: Yes  Discharge Location  Discharge Placement: Skilled nursing facility

## 2021-09-03 NOTE — PROGRESS NOTES
TRANSFER - IN REPORT:    Verbal report received from RONNIE Harry (name) on Luis Blackman  being received from ED(unit) for routine progression of care      Report consisted of patients Situation, Background, Assessment and   Recommendations(SBAR). Information from the following report(s) SBAR, Kardex, [facility] and Recent Results was reviewed with the receiving nurse. Opportunity for questions and clarification was provided. Assessment completed upon patients arrival to unit and care assumed. Uneventful shift; no acute changes, no complaints of pain    Bedside and verbal report given by (off going nurse) TOYA Whitley RN to (oncoming nurse) Arnulfo Cross RN. Report included the following information SBAR, Kardex, OR Summary, Intake/Output, and MAR.

## 2021-09-03 NOTE — PROGRESS NOTES
Problem: Mobility Impaired (Adult and Pediatric)  Goal: *Acute Goals and Plan of Care (Insert Text)  Description: Physical Therapy Goals   Initiated 9/3/2021 and to be accomplished within 3-4 day(s)  1. Patient will move from supine <> sit with S in prep for out of bed activity and change of position. 2.  Patient will perform sit<> stand with S with LRAD in prep for transfers/ambulation. 3.  Patient will transfer from bed <> chair with S with LRAD for time up in chair for completion of ADL activity. 4.  Patient will ambulate 100 feet with S/LRAD for improved functional mobility at discharge. 5.  Patient will ascend/descend 3-5 stairs with handrail(s) with minimal assistance/contact guard assist for home re-entry as needed. Outcome: Progressing Towards Goal  PHYSICAL THERAPY EVALUATION    Patient: Luis Hein (80 y.o. female)  Date: 9/3/2021  Primary Diagnosis: CAP (community acquired pneumonia) [J18.9]  Suspected COVID-19 virus infection [Z20.822]  COVID-19 vaccine series completed [Z92.29]  Precautions:   Fall  PLOF: amb with SPC PTA; lives with spouse in [facility] with 3 DAYSI and BHR's    ASSESSMENT :  Based on the objective data described below, the patient seen on medical unit and presents with decr'd independence in functional mobility with regard to bed mobility/transfers, and gait. Pt  reports no pain this session. Demonstrates bed mobility with min/SBA, transfers with min A using SPC and able to take several lateral steps with SPC/min HHA along side of bed. Yue decr'd and foot clearance decr'd. Pt incontinent of urine while standing (brief in place)). Returned to bed, pt performed self hygiene care, then new brief applied. Pt left  with all needs in reach, and nurse Liz notified. May be candidate for HHPT upon discharge.      Pt Education: Role of physical therapy in acute care setting, fall prevention and safety/technique during functional mobility tasks      Patient will benefit from skilled intervention to address the above impairments. Patients rehabilitation potential is considered to be Good  Factors which may influence rehabilitation potential include:   []         None noted  [x]         Mental ability/status  [x]         Medical condition  []         Home/family situation and support systems  []         Safety awareness  []         Pain tolerance/management  []         Other:      PLAN :  Recommendations and Planned Interventions:  [x]           Bed Mobility Training             []    Neuromuscular Re-Education  [x]           Transfer Training                   []    Orthotic/Prosthetic Training  [x]           Gait Training                          []    Modalities  [x]           Therapeutic Exercises          []    Edema Management/Control  [x]           Therapeutic Activities            [x]    Patient and Family Training/Education  []           Other (comment):    Frequency/Duration: Patient will be followed by physical therapy 1-2 times per day to address goals. Discharge Recommendations: Home Health   Further Equipment Recommendations for Discharge: N/A     SUBJECTIVE:   Patient stated I'm sleepy.     OBJECTIVE DATA SUMMARY:     Past Medical History:   Diagnosis Date    Adverse effect of anesthesia     very, very sleepy with Anesthesia    Adverse effect of anesthesia     decreased heartrate with colonoscopy    Anemia     Arthritis     osteoarthritis    CAD (coronary artery disease)     Depression     Diabetes ([facility] [address]     adult onset    Difficult intubation     went bradycardc with symptoms, had diificuly waking up for 24 hours    Foot fracture 2008    right    GERD (gastroesophageal reflux disease)     Heart attack ([facility] [address] 2005    Hypercholesterolemia     Hyperlipidemia     Hypertension     Ill-defined condition 10/2018    reddened itchy bumps on lower abdomen after steroid injections in October, itchy patient states, very red, slightly opened with yellow looking head on them. Ill-defined condition     history of frequent uti's, especially with stress and pressure on the stomach    Ill-defined condition     history of post nasal drip    Ill-defined condition     history of frequent small BM's at night    Ill-defined condition     recurren bladder infection    Menopause     Mitral regurgitation     Myocardial infarct (HCC)     Nausea & vomiting     severe nausea    Sick sinus syndrome (HCC)     Thyroid disease      Past Surgical History:   Procedure Laterality Date    HX CATARACT REMOVAL Right     HX GI      colonosopy    HX GYN      bartholin cyst    HX HEART CATHETERIZATION  2005    HX HEENT Bilateral     macular puckering    HX HEENT      sinus surgery    HX KNEE ARTHROSCOPY Right     HX ORTHOPAEDIC      cortisone shots to both knees    HX OTHER SURGICAL      shoulder surgery    HX OTHER SURGICAL      eye surgery    HX OTHER SURGICAL  many years ago    sinus surgery    HX OTHER SURGICAL      knee surgery    HX OTHER SURGICAL  10-22 & 10-29    Epidural steroid injection    HX SHOULDER ARTHROSCOPY Right           Barriers to Learning/Limitations: yes;  language  Compensate with: Visual Cues and Tactile Cues  Prior Level of Function/Home Situation:   Home Situation  Home Environment: Private residence  # Steps to Enter: 3  Rails to Enter: Yes  Hand Rails : Bilateral  One/Two Story Residence: One story  Living Alone: No  Support Systems: Spouse/Significant Other/Partner  Patient Expects to be Discharged to[de-identified] House  Current DME Used/Available at Home: [address], [place], straight, [place], rolling  Critical Behavior:  Neurologic State: Alert  Orientation Level: Oriented to person;Oriented to place  Cognition: Follows commands  Safety/Judgement: Fall prevention  Psychosocial  Patient Behaviors: Calm; Cooperative  Purposeful Interaction: Yes  Pt Identified Daily Priority: Clinical issues (comment)  Caritas Process: Establish trust;Nurture loving kindness  Caring Interventions: Reassure; Therapeutic modalities  Reassure: Informing; Therapeutic listening;Caring rounds  Therapeutic Modalities: Humor; Intentional therapeutic touch  Skin Condition/Temp: Dry;Warm  Skin Integumentary  Skin Color: Ecchymosis (comment) (to arms)  Skin Condition/Temp: Dry;Warm  Skin Integrity: Intact  Turgor: Non-tenting  Strength:    Strength: Generally decreased, functional  Tone & Sensation:   Tone: Normal  Sensation: Intact  Range Of Motion:  AROM: Within functional limits  Functional Mobility:  Bed Mobility:  Rolling: Stand-by assistance  Supine to Sit: Minimum assistance  Sit to Supine: Contact guard assistance  Scooting: Contact guard assistance  Transfers:  Sit to Stand: Minimum assistance  Stand to Sit: Minimum assistance  Balance:   Sitting: Intact  Standing: Intact; With support  Ambulation/Gait Training:  Distance (ft): 3 Feet (ft)  Assistive Device: Cane, straight (min HHA)  Gait Description (WDL): Exceptions to WDL  Gait Abnormalities: Decreased step clearance  Pain:  Pain Scale 1: Numeric (0 - 10)  Pain Intensity 1: 0  Activity Tolerance:   Fair   Please refer to the flowsheet for vital signs taken during this treatment. After treatment:   []         Patient left in no apparent distress sitting up in chair  [x]         Patient left in no apparent distress in bed  [x]         Call bell left within reach  [x]         Nursing notified  []         Caregiver present  []         Bed alarm activated    COMMUNICATION/EDUCATION:   [x]         Role of Physical Therapy in the acute care setting. [x]         Fall prevention education was provided and the patient/caregiver indicated understanding. [x]         Patient/family have participated as able in goal setting and plan of care. [x]         Patient/family agree to work toward stated goals and plan of care. []         Patient understands intent and goals of therapy, but is neutral about his/her participation.   []         Patient is unable to participate in goal setting/plan of care: ongoing with therapy staff.  []         Other:    Eval Complexity: History: HIGH Complexity :3+ comorbidities / personal factors will impact the outcome/ POC Exam:MEDIUM Complexity : 3 Standardized tests and measures addressing body structure, function, activity limitation and / or participation in recreation  Presentation: MEDIUM Complexity : Evolving with changing characteristics  Clinical Decision Making:Medium Complexity    Overall Complexity:MEDIUM    Thank you for this referral.  Clerance Certain, PT   Time Calculation: 17 mins

## 2021-09-03 NOTE — PROGRESS NOTES
Hospitalist Progress Note    Patient: Jose Bolanos MRN: [id number]  CSN: [id number]    YOB: 1932  Age: 80 y.o. Sex: female    DOA: 9/2/2021 LOS:  LOS: 1 day                Assessment/Plan     Patient Active Problem List   Diagnosis Code    Hypertension I10    Diabetes ([facility] [address] E11.9    Fatigue R53.83    Hyperlipidemia E78.5    Dizziness R44    H/O myocardial infarction, greater than 8 weeks I25.2    Back pain M54.9    Aortic valve disorders I35.9    Lumbar spinal stenosis M48.061    Lumbar spondylosis M47.816    Radiculopathy of leg M54.10    CAP (community acquired pneumonia) J18.9    COVID-19 vaccine series completed Z92.29    Suspected COVID-19 virus infection Z20.822    CAD (coronary artery disease) I25.10    D[facility] ([facility] F03.90    Delirium R41.0    Acquired hypothyroidism E03.9      Chief complaint :   Dizziness, vomiting    80 y.o. female with diabetes, hypertension, CAD, hypothyroidism, hyperlipidemia, GERD presents to ER with concerns of dizziness, vomiting. Continues to complains of dizziness, also reports that she has right inner ear problem. Will get MRI brain. Community-acquired pneumonia-  Continue with ceftriaxone and azithromycin     Concern for aspiration versus COVID-19  CT scan showed scattered areas of pulmonary interstitial and groundglass opacities within both lower lobes, may represent infectious process, atelectasis or sequela of edema. Rapid Covid negative     CAD-  Continue with beta-blocker and statin     Hypertension-  Continue with home medication, continue on telmisartan and hydrochlorothiazide, monitor blood pressure.     DM-  Hold oral hypoglycemics, start on sliding scale insulin and diabetic diet hypothyroidism-  Continue Synthroid  TSH in normal range. Dementia with delirium -  Pt is at high risk for delirium.  Attempt to maintain circadian rhythm, avoid excessive or unnecessary lab draws/needle sticks, unnecessary lines (telemetry leads, IVs, weiss catheters). Minimize psychotropics and physical restraints. Try to have patient family at bedside at night for redirection. Move room closer to nursing station if needed.     DVT prophylaxis with heparin     PT/OT     Discussed with  at bedside       Palliative care consult    Disposition : 1-2 days    Review of systems  General: No fevers or chills. Cardiovascular: No chest pain or pressure. No palpitations. Pulmonary: No shortness of breath. Gastrointestinal: No nausea, vomiting. Physical Exam:  General: Awake, cooperative, no acute distress    HEENT: NC, Atraumatic. PERRLA, anicteric sclerae. Lungs: Rales lower lungs bilaterally. Heart:  S1 S2,  No murmur, No Rubs, No Gallops  Abdomen: Soft, Non distended, Non tender.  +Bowel sounds,   Extremities: No c/c/e  Psych:   Not anxious or agitated. Neurologic:  No acute neurological deficit. Vital signs/Intake and Output:  Visit Vitals  BP (!) 157/68 (BP 1 Location: Left upper arm, BP Patient Position: Lying)   Pulse 66   Temp 97.9 °F (36.6 °C)   Resp 18   Wt 45.4 kg (100 lb)   SpO2 100%   BMI 22.42 kg/m²     Current Shift:  No intake/output data recorded.   Last three shifts:  09/01 1901 - 09/03 0700  In: 1300 [I.V.:1300]  Out: -             Labs: Results:       Chemistry Recent Labs     09/02/21  0945   *      K 3.9      CO2 23   BUN 42*   CREA 1.35*   CA 9.5   AGAP 10   BUCR 31*   AP 88   TP 7.3   ALB 3.5   GLOB 3.8   AGRAT 0.9      CBC w/Diff Recent Labs     09/02/21  0945   WBC 8.8   RBC 4.83   HGB 12.7   HCT 38.9      GRANS 77*   LYMPH 14*   EOS 1      Cardiac Enzymes Recent Labs     09/02/21  0945   CPK 58   CKND1 4.5*      Coagulation Recent Labs     09/02/21  0945   PTP 12.7   INR 1.0   APTT 27.8       Lipid Panel No results found for: CHOL, CHOLPOCT, CHOLX, CHLST, CHOLV, 109224, HDL, HDLP, LDL, LDLC, DLDLP, 604815, VLDLC, VLDL, TGLX, TRIGL, TRIGP, TGLPOCT, CHHD, CHHDX   BNP No results for input(s): BNPP in the last 72 hours.    Liver Enzymes Recent Labs     09/02/21  0945   TP 7.3   ALB 3.5   AP 88      Thyroid Studies Lab Results   Component Value Date/Time    TSH 3.31 09/02/2021 09:45 AM        Procedures/imaging: see electronic medical records for all procedures/Xrays and details which were not copied into this note but were reviewed prior to creation of Plan

## 2021-09-03 NOTE — PROGRESS NOTES
Problem: Dysphagia (Adult)  Goal: *Acute Goals and Plan of Care (Insert Text)  Description: Patient will:  1. Tolerate soft & bite-sized diet with thin liquids without overt s/sx of aspiration under SLP supervision. 2. Tolerate diet upgrade without overt s/sx of aspiration under SLP supervision. 3. Utilize compensatory swallow strategies of small bite/sip, alternate liquid/solid with min cues in 4/5 trials. 4. Complete an objective swallow study (i.e., MBSS) to assess swallow integrity, r/o aspiration, and determine of safest LRD, min A.      Rec:   Soft & bite-sized diet, thin liquids  Aspiration precautions  HOB >45 during po intake, remain >30 for 30-45 minutes after po   Small bites/sips; alternate liquid/solid, slow feeding rate   Oral care TID  Meds WHOLE in puree    Outcome: Progressing Towards Goal     SPEECH LANGUAGE PATHOLOGY BEDSIDE SWALLOW EVALUATION    Patient: Luis Guy (80 y.o. female)  Date: 9/3/2021  Primary Diagnosis: CAP (community acquired pneumonia) [J18.9]  Suspected COVID-19 virus infection [Z20.822]  COVID-19 vaccine series completed [Z92.29]        Precautions: aspiration       PLOF: Per H&P    ASSESSMENT :  Based on the objective data described below, the patient presents with mild oral dysphagia. Pt seen for swallow eval. Pt alert, appears confused, accepting of eval. Pt speaking in limited English phrases, though able to communicate thoughts and answer questions effectively. Pt denies difficulty swallowing, reporting regular diet prior to current admission, eats with chopsticks. Pt with limited dentition. Remaining oral motor structures, strength, and ROM WFL; grossly intact for mastication and deglutition. Pt self-feeding PO trials of thin liquid via straw and tandem drinking, puree,  and regular textures and pills with water/whole in puree. Pt with mildly prolonged mastication of regular texture, though cleared effectively. ? Ability to cut foods effectively - will defer to OT. Pt unable to clear pills with liquid wash; effectively cleared taken whole in applesauce. No s/sx of aspiration appreciated across consistencies. Swallow appears timely, adequate laryngeal elevation noted via palpation, no change in vocal/resp quality appreciated. Recommend soft & bite-sized texture diet with thin liquids, meds WHOLE in applesauce. May benefit from [facility] should concern for aspiration be ongoing. Pt educated on and verbalized understanding of findings, recs, and POC; d/w RN in room administering meds. Will FU x1-2 visits for diet tolerance. Patient will benefit from skilled intervention to address the above impairments. Patient's rehabilitation potential is considered to be Good  Factors which may influence rehabilitation potential include:   []            None noted  [x]            Mental ability/status  []            Medical condition  []            Home/family situation and support systems  [x]            Safety awareness  []            Pain tolerance/management  []            Other:      PLAN :  Recommendations and Planned Interventions:  See above  Frequency/Duration: Patient will be followed by speech-language pathology 1-2 times to address goals. Discharge Recommendations: To Be Determined     SUBJECTIVE:   Patient stated I take my medicine at [address] [place] [address].     OBJECTIVE:     Past Medical History:   Diagnosis Date    Adverse effect of anesthesia     very, very sleepy with Anesthesia    Adverse effect of anesthesia     decreased heartrate with colonoscopy    Anemia     Arthritis     osteoarthritis    CAD (coronary artery disease)     Depression     Diabetes ([address]     adult onset    Difficult intubation     went bradycardc with symptoms, had diificuly waking up for 24 hours    Foot fracture 2008    right    GERD (gastroesophageal reflux disease)     Heart attack ([facility] [address] 2005    Hypercholesterolemia     Hyperlipidemia     Hypertension     Ill-defined condition 10/2018    reddened itchy bumps on lower abdomen after steroid injections in October, itchy patient states, very red, slightly opened with yellow looking head on them.     Ill-defined condition     history of frequent uti's, especially with stress and pressure on the stomach    Ill-defined condition     history of post nasal drip    Ill-defined condition     history of frequent small BM's at night    Ill-defined condition     recurren bladder infection    Menopause     Mitral regurgitation     Myocardial infarct (HCC)     Nausea & vomiting     severe nausea    Sick sinus syndrome (HCC)     Thyroid disease      Past Surgical History:   Procedure Laterality Date    HX CATARACT REMOVAL Right     HX GI      colonosopy    HX GYN      bartholin cyst    HX HEART CATHETERIZATION  2005    HX HEENT Bilateral     macular puckering    HX HEENT      sinus surgery    HX KNEE ARTHROSCOPY Right     HX ORTHOPAEDIC      cortisone shots to both knees    HX OTHER SURGICAL      shoulder surgery    HX OTHER SURGICAL      eye surgery    HX OTHER SURGICAL  many years ago    sinus surgery    HX OTHER SURGICAL      knee surgery    HX OTHER SURGICAL  10-22 & 10-29    Epidural steroid injection    HX SHOULDER ARTHROSCOPY Right           Home Situation:        Diet prior to admission: regular  Current Diet:  soft & bite-sized/thin     Cognitive and Communication Status:  Neurologic State: Alert, Confused  Orientation Level: Oriented to person, Oriented to place  Cognition: Appropriate for age attention/concentration, Follows commands  Perception: Appears intact (reported partial blindness during chart review.)  Perseveration: No perseveration noted  Safety/Judgement: Decreased awareness of need for assistance, Decreased awareness of need for safety, Decreased insight into deficits, Fall prevention  Oral Assessment:  Oral Assessment  Labial: No impairment  Dentition: Natural;Limited  Oral Hygiene: Fair  Lingual: No impairment  Velum: Unable to visualize  Mandible: No impairment  P.O. Trials:  Patient Position: [facility]*  Vocal quality prior to P.O.: Low volume  Consistency Presented: Thin liquid;Puree; Solid;Pill/Tablet  How Presented: Self-fed/presented;Straw;Successive swallows;Spoon     Bolus Acceptance: No impairment  Bolus Formation/Control: Impaired  Type of Impairment: Delayed;Mastication  Propulsion: No impairment  Oral Residue: None  Initiation of Swallow: No impairment  Laryngeal Elevation: Functional  Aspiration Signs/Symptoms: None  Pharyngeal Phase Characteristics: No impairment, issues, or problems   Effective Modifications: None  Cues for Modifications: None       Oral Phase Severity: Mild  Pharyngeal Phase Severity : No impairment    PAIN:  Pain level pre-treatment: 0/10   Pain level post-treatment: 0/10       After treatment:   []            Patient left in no apparent distress sitting up in chair  [x]            Patient left in no apparent distress in bed  [x]            Call bell left within reach  [x]            Nursing notified  []            Family present  []            Caregiver present  []            Bed alarm activated    COMMUNICATION/EDUCATION:   [x]            Aspiration precautions; swallow safety; compensatory techniques. [x]            Patient/family have participated as able in goal setting and plan of care. [x]            Patient/family agree to work toward stated goals and plan of care. []            Patient understands intent and goals of therapy; neutral about participation. []            Patient unable to participate in goal setting/plan of care; educ ongoing with interdisciplinary staff  []         Posted safety precautions in patient's room.     Thank you for this referral.    Rossy Casey M.S., CCC-SLP  Speech-Language Pathologist    Time Calculation: 17 mins

## 2021-09-03 NOTE — PROGRESS NOTES
[address] care of pt at this time. Assessment complete. Pt alert and oriented x self and place. Shows no sign of distress. Fall risk arm band in place. Denies SOB and chest pain. Pt lungs clear bilaterally. Cap refill  less than 3 seconds. Pt denies numbness and tingling to all extremities. Stated pain 0/10. Pt has 20 G IV to R hand and L forearm. Pt has no dressing skin intact with multiple bruising to arms . On heparin for VTE. Incentive spirometer at bedside. Pt encouraged to continue use of IS. Pt verbalized understanding. Call light and possessions within reach. Bed locked and in low position. Will continue to monitor. 7034  Speech and nurse at bedside. Pt tolerated pills in applesauce and sipped water w/o chocking or difficulty.  Speech recommends dental soft diet but no concerns of aspiration at this time     1004  Spouse in to see pt at this time

## 2021-09-03 NOTE — ACP (ADVANCE CARE PLANNING)
Advance Care Planning     General Advance Care Planning (ACP) Conversation    Date of Conversation: 9/3/2021  Chelsy Jung was held with patient and . She speaks [place] as a second language ([address] is her first language). She has significant sensorineural hearing loss and does no have access to her hearing aids currently. Conducted with: Patient with Decision Making Capacity and Healthcare Decision Maker: Next of Kin by law (only applies in absence of a Healthcare Power of  or [address]) Waddell Sandifer states there are MPOA documents at home naming him primary and a niece and nephew who live in [place] have been named as secondary decision makers. Mr Lul Jefferson is legal next of kin. Healthcare Decision Maker: Today we documented Decision Maker(s) consistent with Legal Next of Kin hierarchy and stated formal documentation that is at home. Content/Action Overview: Has ACP document(s) NOT on file - requested  to provide  Reviewed DNR/DNI and  confirms current DNR status. States they have documentation supporting DNR status at home. Encouraged to bring in for scanning to EMR.     9/3/2021 0850 Seen today in room 304. Awake, alert. Oriented as best as I can see based on her hearing loss of language issues. She asked when her  could come visit her in the hospital.  Respirations unlabored on room air. Able to speak in full  sentences. Pain--denies     1030: Met with  and patient in patient's room. She was eagerly eating her breakfast (pancakes and fruit). No coughing or dysphagia noted. Very engaged in conversation. States \"I speak broken [place] and he speaks broken [place]ian\". She came to the ED yesterday after a sudden onset of nausea, dry heaving, and dizziness that happened after she walked to the bathroom. She had previous episodes of abdominal pain and saw her PCP. She had an abdominal CT that did not show anything worrisome per her .      PMH significant for decreased vision, decreased hearing, dyslipidemia, arthritis, type II DM, CAD, mitral regurgitation, and HTN    Lives in a single family home. They do not have children. They have been  for > 40 years. Mrs Darnell Blackman needs assistance with ADLs. Uses a cane for ambulation assistance and has a walker and wheelchair that she uses when she leaves the house. Mr Darnell Blackman does all the driving. When asked who did the cooking he answered \"[employer]\". Mrs Darenll Blackman aid they often eat frozen prepared meals. Introduced the role of palliative medicine for the hospitalized patient. Asked if there was any formal documentation outlining MPOA and he said they had completed POA documents and he had them in a secure location at home. He also said they had documentation supporting a DNR choice. Encouraged him to bring these documents in for scanning. Reviewed current living situation with care manager, Yael Duarte. Palliative Medicine is grateful for the assistance of Care Management. CODE STATUS:  DNR/DNI    Disposition plan: anticipate she will return home with her . Will need to see if she qualifies for any home health services. Palliative care will continue to follow Luis Solis  and her family during her hospitalization and support them as they make healthcare decisions and define goals of care.     Kimberly Causey RN, MSN  Palliative Medicine  P: [phone]

## 2021-09-04 LAB
ANION GAP SERPL CALC-SCNC: 10 MMOL/L (ref 3–18)
BUN SERPL-MCNC: 34 MG/DL (ref 7–18)
BUN/CREAT SERPL: 30 (ref 12–20)
CALCIUM SERPL-MCNC: 8.4 MG/DL (ref 8.5–10.1)
CHLORIDE SERPL-SCNC: 112 MMOL/L (ref 100–111)
CO2 SERPL-SCNC: 21 MMOL/L (ref 21–32)
CREAT SERPL-MCNC: 1.13 MG/DL (ref 0.6–1.3)
ERYTHROCYTE [DISTWIDTH] IN BLOOD BY AUTOMATED COUNT: 16.8 % (ref 11.6–14.5)
GLUCOSE BLD STRIP.AUTO-MCNC: 118 MG/DL (ref 70–110)
GLUCOSE BLD STRIP.AUTO-MCNC: 139 MG/DL (ref 70–110)
GLUCOSE BLD STRIP.AUTO-MCNC: 165 MG/DL (ref 70–110)
GLUCOSE BLD STRIP.AUTO-MCNC: 94 MG/DL (ref 70–110)
GLUCOSE SERPL-MCNC: 90 MG/DL (ref 74–99)
HCT VFR BLD AUTO: 35.5 % (ref 35–45)
HGB BLD-MCNC: 11.7 G/DL (ref 12–16)
MCH RBC QN AUTO: 26.2 PG (ref 24–34)
MCHC RBC AUTO-ENTMCNC: 33 G/DL (ref 31–37)
MCV RBC AUTO: 79.6 FL (ref 78–100)
PLATELET # BLD AUTO: 248 K/UL (ref 135–420)
PMV BLD AUTO: 10.6 FL (ref 9.2–11.8)
POTASSIUM SERPL-SCNC: 4 MMOL/L (ref 3.5–5.5)
RBC # BLD AUTO: 4.46 M/UL (ref 4.2–5.3)
SARS-COV-2, NAA: NOT DETECTED
SODIUM SERPL-SCNC: 143 MMOL/L (ref 136–145)
WBC # BLD AUTO: 9.2 K/UL (ref 4.6–13.2)

## 2021-09-04 PROCEDURE — 74011250636 HC RX REV CODE- 250/636: Performed by: FAMILY MEDICINE

## 2021-09-04 PROCEDURE — 74011250637 HC RX REV CODE- 250/637: Performed by: INTERNAL MEDICINE

## 2021-09-04 PROCEDURE — 36415 COLL VENOUS BLD VENIPUNCTURE: CPT

## 2021-09-04 PROCEDURE — 85027 COMPLETE CBC AUTOMATED: CPT

## 2021-09-04 PROCEDURE — 74011250636 HC RX REV CODE- 250/636: Performed by: HOSPITALIST

## 2021-09-04 PROCEDURE — 74011636637 HC RX REV CODE- 636/637: Performed by: HOSPITALIST

## 2021-09-04 PROCEDURE — 74011000250 HC RX REV CODE- 250: Performed by: PHYSICIAN ASSISTANT

## 2021-09-04 PROCEDURE — 74011250637 HC RX REV CODE- 250/637: Performed by: HOSPITALIST

## 2021-09-04 PROCEDURE — 74011250636 HC RX REV CODE- 250/636: Performed by: PHYSICIAN ASSISTANT

## 2021-09-04 PROCEDURE — 82962 GLUCOSE BLOOD TEST: CPT

## 2021-09-04 PROCEDURE — 80048 BASIC METABOLIC PNL TOTAL CA: CPT

## 2021-09-04 PROCEDURE — 65270000029 HC RM PRIVATE

## 2021-09-04 RX ORDER — ACETAMINOPHEN 325 MG/1
650 TABLET ORAL
Status: DISCONTINUED | OUTPATIENT
Start: 2021-09-04 | End: 2021-09-09 | Stop reason: HOSPADM

## 2021-09-04 RX ORDER — HYDRALAZINE HYDROCHLORIDE 20 MG/ML
20 INJECTION INTRAMUSCULAR; INTRAVENOUS
Status: DISCONTINUED | OUTPATIENT
Start: 2021-09-04 | End: 2021-09-09 | Stop reason: HOSPADM

## 2021-09-04 RX ORDER — ONDANSETRON 2 MG/ML
4 INJECTION INTRAMUSCULAR; INTRAVENOUS
Status: DISCONTINUED | OUTPATIENT
Start: 2021-09-04 | End: 2021-09-09 | Stop reason: HOSPADM

## 2021-09-04 RX ADMIN — TELMISARTAN 40 MG: 40 TABLET ORAL at 08:22

## 2021-09-04 RX ADMIN — PRAVASTATIN SODIUM 40 MG: 20 TABLET ORAL at 21:39

## 2021-09-04 RX ADMIN — ACETAMINOPHEN 650 MG: 325 TABLET ORAL at 15:17

## 2021-09-04 RX ADMIN — HEPARIN SODIUM 5000 UNITS: 5000 INJECTION INTRAVENOUS; SUBCUTANEOUS at 08:22

## 2021-09-04 RX ADMIN — ACETAMINOPHEN 650 MG: 325 TABLET ORAL at 23:06

## 2021-09-04 RX ADMIN — CEFTRIAXONE SODIUM 2 G: 2 INJECTION, POWDER, FOR SOLUTION INTRAMUSCULAR; INTRAVENOUS at 13:29

## 2021-09-04 RX ADMIN — MONTELUKAST 10 MG: 10 TABLET, FILM COATED ORAL at 17:39

## 2021-09-04 RX ADMIN — LEVOTHYROXINE SODIUM 25 MCG: 0.03 TABLET ORAL at 21:39

## 2021-09-04 RX ADMIN — AZITHROMYCIN MONOHYDRATE 500 MG: 500 INJECTION, POWDER, LYOPHILIZED, FOR SOLUTION INTRAVENOUS at 13:29

## 2021-09-04 RX ADMIN — MULTIPLE VITAMINS W/ MINERALS TAB 1 TABLET: TAB at 08:22

## 2021-09-04 RX ADMIN — HEPARIN SODIUM 5000 UNITS: 5000 INJECTION INTRAVENOUS; SUBCUTANEOUS at 00:48

## 2021-09-04 RX ADMIN — INSULIN LISPRO 2 UNITS: 100 INJECTION, SOLUTION INTRAVENOUS; SUBCUTANEOUS at 17:39

## 2021-09-04 RX ADMIN — METOPROLOL SUCCINATE 25 MG: 25 TABLET, EXTENDED RELEASE ORAL at 21:39

## 2021-09-04 RX ADMIN — ONDANSETRON 4 MG: 2 INJECTION INTRAMUSCULAR; INTRAVENOUS at 21:47

## 2021-09-04 RX ADMIN — HYDROCHLOROTHIAZIDE 12.5 MG: 25 TABLET ORAL at 08:22

## 2021-09-04 RX ADMIN — OMEGA-3-ACID ETHYL ESTERS 1 CAPSULE: 1 CAPSULE, LIQUID FILLED ORAL at 08:22

## 2021-09-04 RX ADMIN — HEPARIN SODIUM 5000 UNITS: 5000 INJECTION INTRAVENOUS; SUBCUTANEOUS at 17:38

## 2021-09-04 RX ADMIN — HYDRALAZINE HYDROCHLORIDE 20 MG: 20 INJECTION INTRAMUSCULAR; INTRAVENOUS at 21:39

## 2021-09-04 NOTE — PROGRESS NOTES
Hospitalist Progress Note    Patient: Jaz Chowdhury MRN: [id number]  CSN: [id number]    YOB: 1932  Age: 80 y.o. Sex: female    DOA: 9/2/2021 LOS:  LOS: 2 days            Patient Active Problem List   Diagnosis Code    Hypertension I10    Diabetes ([address] E11.9    Fatigue R53.83    Hyperlipidemia E78.5    Dizziness R44    H/O myocardial infarction, greater than 8 weeks I25.2    Back pain M54.9    Aortic valve disorders I35.9    Lumbar spinal stenosis M48.061    Lumbar spondylosis M47.816    Radiculopathy of leg M54.10    CAP (community acquired pneumonia) J18.9    COVID-19 vaccine series completed Z92.29    Suspected COVID-19 virus infection Z20.822    CAD (coronary artery disease) I25.10    Dementia ([address] F03.90    Delirium R41.0    Acquired hypothyroidism E03.9        IMPRESSION and Plan:    Luis Blackman is a 80 y.o. female with   Patient Active Problem List    Diagnosis Date Noted    CAP (community acquired pneumonia) 09/02/2021    COVID-19 vaccine series completed 09/02/2021    Suspected COVID-19 virus infection 09/02/2021    Dementia ([address] 09/02/2021    Delirium 09/02/2021    Acquired hypothyroidism 09/02/2021    CAD (coronary artery disease)     Lumbar spinal stenosis 01/15/2019    Lumbar spondylosis 01/15/2019    Radiculopathy of leg 01/15/2019    Aortic valve disorders 12/17/2013    Back pain 10/10/2013    Dizziness 06/13/2013    H/O myocardial infarction, greater than 8 weeks 06/13/2013    Hypertension     [address]     Fatigue     Hyperlipidemia      Principal Problem:    CAP (community acquired pneumonia) (9/2/2021)    Active Problems:    COVID-19 vaccine series completed (9/2/2021)      Suspected COVID-19 virus infection (9/2/2021)      Dementia ([address] (9/2/2021)      Delirium (9/2/2021)      Acquired hypothyroidism (9/2/2021)         88 y. o. female with diabetes, hypertension, CAD, hypothyroidism, hyperlipidemia, GERD presents to ER with concerns of dizziness, vomiting.        Dizziness -- improved. MRI brain no acute ds    Community-acquired pneumonia. Vs aspiriaton pneumonia-  Continue with ceftriaxone and azithromycin        Rapid Covid negative     CAD-  Continue with beta-blocker and statin     Hypertension- bp/hr stbale   DM-  Hold oral hypoglycemics, start on sliding scale insulin and diabetic diet hypothyroidism-  Continue Synthroid  TSH in normal range.     Dementia with delirium -       DVT prophylaxis with heparin     PT/OT        Patient's condition is fair    Needs SNF placement        Recommend to continue hospitalization. Discussed with patient. Chief Complaints:   Chief Complaint   Patient presents with    Abdominal Pain    Vomiting     SUBJECTIVE:  Pt is seen and examined. Chart reviewed  Resting comfortably       Review of systems:    Review of Systems   Constitutional: Negative for malaise/fatigue. HENT: Negative. Eyes: Negative. Respiratory: Positive for shortness of breath. Cardiovascular: Negative for chest pain, palpitations, orthopnea and leg swelling. Gastrointestinal: Negative. Negative for abdominal pain, diarrhea and heartburn. Genitourinary: Negative for dysuria and hematuria. Skin: Negative. Neurological: Positive for weakness. Psychiatric/Behavioral: Negative for depression, substance abuse and suicidal ideas. The patient is not nervous/anxious.         PE:  Patient Vitals for the past 24 hrs:   BP Temp Pulse Resp SpO2   09/04/21 0725 (!) 171/76 97.8 °F (36.6 °C) 63 15 98 %   09/04/21 0406 (!) 154/70 98 °F (36.7 °C) (!) 59 16 99 %   09/04/21 0059 (!) 150/63 98 °F (36.7 °C) 62 16 100 %   09/03/21 1904 (!) 158/79 97.8 °F (36.6 °C) 63 18 100 %   09/03/21 1526 (!) 158/72 97.9 °F (36.6 °C) 60 18 100 %       Intake/Output Summary (Last 24 hours) at 9/4/2021 1300  Last data filed at 9/4/2021 0232  Gross per 24 hour   Intake    Output 600 ml   Net -600 ml     Patient Vitals for the past 120 hrs:   Weight 09/02/21 0831 45.4 kg (100 lb)         Physical Exam  Vitals and nursing note reviewed. Constitutional:       General: She is in acute distress. Neck:      Vascular: No JVD. Cardiovascular:      Rate and Rhythm: Normal rate and regular rhythm. Heart sounds: Normal heart sounds. Pulmonary:      Effort: Respiratory distress present. Breath sounds: Normal breath sounds. Abdominal:      General: Bowel sounds are normal. There is no distension. Palpations: Abdomen is soft. Tenderness: There is no abdominal tenderness. There is no rebound. Musculoskeletal:         General: Normal range of motion. Cervical back: Normal range of motion and neck supple. Skin:     General: Skin is warm and dry. Neurological:      Mental Status: She is alert and oriented to person, place, and time. Psychiatric:         Mood and Affect: Affect normal.             Intake and Output:  Current Shift:  No intake/output data recorded.   Last three shifts:  09/02 1901 - 09/04 0700  In: -   Out: 600 [Urine:600]    Lab/Data Reviewed:  Recent Results (from the past 8 hour(s))   GLUCOSE, POC    Collection Time: 09/04/21  7:34 AM   Result Value Ref Range    Glucose (POC) 94 70 - 110 mg/dL     Medications:  Current Facility-Administered Medications   Medication Dose Route Frequency    cefTRIAXone (ROCEPHIN) 2 g in sterile water (preservative free) 20 mL IV syringe  2 g IntraVENous Q24H    azithromycin (ZITHROMAX) 500 mg in 0.9% sodium chloride 250 mL (VIAL-MATE)  500 mg IntraVENous Q24H    gentamicin-prednisoLONE (PRED G) 0.3-1 % ophthalmic suspension 1 Drop (Patient Supplied)  1 Drop Both Eyes QID    levothyroxine (SYNTHROID) tablet 25 mcg  25 mcg Oral QHS    metoprolol succinate (TOPROL-XL) XL tablet 25 mg  25 mg Oral QHS    montelukast (SINGULAIR) tablet 10 mg  10 mg Oral QPM    multivitamin, tx-iron-ca-min (THERA-M w/ IRON) tablet 1 Tablet  1 Tablet Oral DAILY    omega-3 acid ethyl esters (LOVAZA) capsule 1 Capsule  1 Capsule Oral DAILY    pravastatin (PRAVACHOL) tablet 40 mg  40 mg Oral QHS    glucose chewable tablet 16 g  16 g Oral PRN    glucagon (GLUCAGEN) injection 1 mg  1 mg IntraMUSCular PRN    dextrose (D50W) injection syrg 25 g  50 mL IntraVENous PRN    insulin lispro (HUMALOG) injection   SubCUTAneous AC&HS    0.9% sodium chloride infusion  100 mL/hr IntraVENous CONTINUOUS    heparin (porcine) injection 5,000 Units  5,000 Units SubCUTAneous Q8H    telmisartan (MICARDIS) tablet 40 mg  40 mg Oral DAILY    And    hydroCHLOROthiazide (HYDRODIURIL) tablet 12.5 mg  12.5 mg Oral DAILY       Recent Results (from the past 24 hour(s))   GLUCOSE, POC    Collection Time: 09/03/21  4:02 PM   Result Value Ref Range    Glucose (POC) 85 70 - 110 mg/dL   GLUCOSE, POC    Collection Time: 09/03/21  9:04 PM   Result Value Ref Range    Glucose (POC) 221 (H) 70 - 776 mg/dL   METABOLIC PANEL, BASIC    Collection Time: 09/04/21  3:45 AM   Result Value Ref Range    Sodium 143 136 - 145 mmol/L    Potassium 4.0 3.5 - 5.5 mmol/L    Chloride 112 (H) 100 - 111 mmol/L    CO2 21 21 - 32 mmol/L    Anion gap 10 3.0 - 18 mmol/L    Glucose 90 74 - 99 mg/dL    BUN 34 (H) 7.0 - 18 MG/DL    Creatinine 1.13 0.6 - 1.3 MG/DL    BUN/Creatinine ratio 30 (H) 12 - 20      GFR est AA 55 (L) >60 ml/min/1.73m2    GFR est non-AA 45 (L) >60 ml/min/1.73m2    Calcium 8.4 (L) 8.5 - 10.1 MG/DL   CBC W/O DIFF    Collection Time: 09/04/21  3:45 AM   Result Value Ref Range    WBC 9.2 4.6 - 13.2 K/uL    RBC 4.46 4.20 - 5.30 M/uL    HGB 11.7 (L) 12.0 - 16.0 g/dL    HCT 35.5 35.0 - 45.0 %    MCV 79.6 78.0 - 100.0 FL    MCH 26.2 24.0 - 34.0 PG    MCHC 33.0 31.0 - 37.0 g/dL    RDW 16.8 (H) 11.6 - 14.5 %    PLATELET 568 004 - 225 K/uL    MPV 10.6 9.2 - 11.8 FL   GLUCOSE, POC    Collection Time: 09/04/21  7:34 AM   Result Value Ref Range    Glucose (POC) 94 70 - 110 mg/dL       Procedures/imaging: see electronic medical records for all procedures/Xrays and details which were not copied into this note but were reviewed prior to creation of Stuart Fournier MD   9/4/2021, 1:00 PM

## 2021-09-04 NOTE — PROGRESS NOTES
1004: Pt perseverating on her  and not being able to get a hold of him. SHADE Baldwin Courser came in and told pt her  will be in shortly. Pt refusing PT until her  arrives. Will follow up again for PT.    1223: 2nd attempt, pt asleep, did not arouse during conversation with spouse, will follow up as schedule permits.

## 2021-09-04 NOTE — ROUTINE PROCESS
Bedside and Verbal shift change report given to Indira Montero RN by Lillie Saini. Report included the following information SBAR, Kardex, OR Summary, Intake/Output and MAR.

## 2021-09-04 NOTE — PROGRESS NOTES
2000 - Bedside report received from [place]. Patient in bed. Pain 0/10. Pt confused.  just came in to see pt, agrees to stay as pt is agitated and does not want to stay in bed, says bed is \" not good/different\", sitting on the edge of bed. 2 IV sites both leaking, probably pulled on them. Wii re-start IV.    2010 - Patient in bed at this time. No IV site, needs one. + CMS. Pt A & O x 1. LS clear, on RA. Abdomen soft, NT and ND. + BS to all 4 quadrants. Denies nausea. Pain 0/10. Call light within reach. 2200-IV restarted by Anais Pozo the supervisor, IVF re-started. Supvsr Ok with  staying. Pt remains confused, bed alarm on for safety. No other issues/concerns at this time.  Call bell within reach

## 2021-09-04 NOTE — PROGRESS NOTES
6350  Assumed care of pt at this time. Assessment complete. Pt alert and oriented x 2 pt speaks [place] but can understand english  . Shows no sign of distress. Fall risk arm band in place. Denies SOB and chest pain. Pt lungs clear bilaterally. Cap refill  less than 3 seconds. Pt denies numbness and tingling to all extremities. Stated pain 0/10. Pt has 22 G IV to R forearm. Pt has no dressing skin intact . On heparin for VTE Incentive spirometer at bedside. Pt encouraged to continue use of IS. Pt verbalized understanding. . Call light and possessions within reach. Bed locked and in low position. Will continue to monitor.      1964  Paged Dr Vaughn Finn in regard to pt stating her left knee hurts and would like     1510  Telephone order with readback for 650mg q6h prn

## 2021-09-05 LAB
ANION GAP SERPL CALC-SCNC: 10 MMOL/L (ref 3–18)
BUN SERPL-MCNC: 27 MG/DL (ref 7–18)
BUN/CREAT SERPL: 27 (ref 12–20)
CALCIUM SERPL-MCNC: 7.8 MG/DL (ref 8.5–10.1)
CHLORIDE SERPL-SCNC: 110 MMOL/L (ref 100–111)
CO2 SERPL-SCNC: 20 MMOL/L (ref 21–32)
CREAT SERPL-MCNC: 0.99 MG/DL (ref 0.6–1.3)
ERYTHROCYTE [DISTWIDTH] IN BLOOD BY AUTOMATED COUNT: 16.7 % (ref 11.6–14.5)
GLUCOSE BLD STRIP.AUTO-MCNC: 106 MG/DL (ref 70–110)
GLUCOSE BLD STRIP.AUTO-MCNC: 115 MG/DL (ref 70–110)
GLUCOSE BLD STRIP.AUTO-MCNC: 93 MG/DL (ref 70–110)
GLUCOSE SERPL-MCNC: 105 MG/DL (ref 74–99)
HCT VFR BLD AUTO: 33.8 % (ref 35–45)
HGB BLD-MCNC: 11.2 G/DL (ref 12–16)
MCH RBC QN AUTO: 26.2 PG (ref 24–34)
MCHC RBC AUTO-ENTMCNC: 33.1 G/DL (ref 31–37)
MCV RBC AUTO: 79.2 FL (ref 78–100)
PLATELET # BLD AUTO: 206 K/UL (ref 135–420)
PMV BLD AUTO: 11.2 FL (ref 9.2–11.8)
POTASSIUM SERPL-SCNC: 3.4 MMOL/L (ref 3.5–5.5)
RBC # BLD AUTO: 4.27 M/UL (ref 4.2–5.3)
SODIUM SERPL-SCNC: 140 MMOL/L (ref 136–145)
WBC # BLD AUTO: 12.7 K/UL (ref 4.6–13.2)

## 2021-09-05 PROCEDURE — 85027 COMPLETE CBC AUTOMATED: CPT

## 2021-09-05 PROCEDURE — 74011250637 HC RX REV CODE- 250/637: Performed by: HOSPITALIST

## 2021-09-05 PROCEDURE — 74011250636 HC RX REV CODE- 250/636: Performed by: FAMILY MEDICINE

## 2021-09-05 PROCEDURE — 36415 COLL VENOUS BLD VENIPUNCTURE: CPT

## 2021-09-05 PROCEDURE — 74011250637 HC RX REV CODE- 250/637: Performed by: INTERNAL MEDICINE

## 2021-09-05 PROCEDURE — 97166 OT EVAL MOD COMPLEX 45 MIN: CPT

## 2021-09-05 PROCEDURE — 74011000250 HC RX REV CODE- 250: Performed by: PHYSICIAN ASSISTANT

## 2021-09-05 PROCEDURE — 82962 GLUCOSE BLOOD TEST: CPT

## 2021-09-05 PROCEDURE — 74011250636 HC RX REV CODE- 250/636: Performed by: HOSPITALIST

## 2021-09-05 PROCEDURE — 65270000029 HC RM PRIVATE

## 2021-09-05 PROCEDURE — 80048 BASIC METABOLIC PNL TOTAL CA: CPT

## 2021-09-05 PROCEDURE — 74011250636 HC RX REV CODE- 250/636: Performed by: PHYSICIAN ASSISTANT

## 2021-09-05 RX ADMIN — METOPROLOL SUCCINATE 25 MG: 25 TABLET, EXTENDED RELEASE ORAL at 22:21

## 2021-09-05 RX ADMIN — MONTELUKAST 10 MG: 10 TABLET, FILM COATED ORAL at 17:58

## 2021-09-05 RX ADMIN — MULTIPLE VITAMINS W/ MINERALS TAB 1 TABLET: TAB at 10:47

## 2021-09-05 RX ADMIN — AZITHROMYCIN MONOHYDRATE 500 MG: 500 INJECTION, POWDER, LYOPHILIZED, FOR SOLUTION INTRAVENOUS at 13:06

## 2021-09-05 RX ADMIN — OMEGA-3-ACID ETHYL ESTERS 1 CAPSULE: 1 CAPSULE, LIQUID FILLED ORAL at 10:46

## 2021-09-05 RX ADMIN — HEPARIN SODIUM 5000 UNITS: 5000 INJECTION INTRAVENOUS; SUBCUTANEOUS at 00:45

## 2021-09-05 RX ADMIN — HEPARIN SODIUM 5000 UNITS: 5000 INJECTION INTRAVENOUS; SUBCUTANEOUS at 10:48

## 2021-09-05 RX ADMIN — CEFTRIAXONE SODIUM 2 G: 2 INJECTION, POWDER, FOR SOLUTION INTRAMUSCULAR; INTRAVENOUS at 13:02

## 2021-09-05 RX ADMIN — HYDRALAZINE HYDROCHLORIDE 20 MG: 20 INJECTION INTRAMUSCULAR; INTRAVENOUS at 13:53

## 2021-09-05 RX ADMIN — TELMISARTAN 40 MG: 40 TABLET ORAL at 10:45

## 2021-09-05 RX ADMIN — HEPARIN SODIUM 5000 UNITS: 5000 INJECTION INTRAVENOUS; SUBCUTANEOUS at 17:58

## 2021-09-05 RX ADMIN — LEVOTHYROXINE SODIUM 25 MCG: 0.03 TABLET ORAL at 22:21

## 2021-09-05 RX ADMIN — HYDROCHLOROTHIAZIDE 12.5 MG: 25 TABLET ORAL at 10:46

## 2021-09-05 RX ADMIN — ACETAMINOPHEN 650 MG: 325 TABLET ORAL at 18:07

## 2021-09-05 RX ADMIN — PRAVASTATIN SODIUM 40 MG: 20 TABLET ORAL at 22:21

## 2021-09-05 NOTE — PROGRESS NOTES
Problem: Falls - Risk of  Goal: *Absence of Falls  Description: Document Donna Mojica Fall Risk and appropriate interventions in the flowsheet. Outcome: Progressing Towards Goal  Note: Fall Risk Interventions:  Mobility Interventions: Bed/chair exit alarm    Mentation Interventions: Adequate sleep, hydration, pain control    Medication Interventions: Patient to call before getting OOB    Elimination Interventions: Call light in reach              Problem: Pressure Injury - Risk of  Goal: *Prevention of pressure injury  Description: Document Marc Scale and appropriate interventions in the flowsheet.   Outcome: Progressing Towards Goal  Note: Pressure Injury Interventions:  Sensory Interventions: Assess changes in LOC    Moisture Interventions: Absorbent underpads    Activity Interventions: Increase time out of bed    Mobility Interventions: HOB 30 degrees or less    Nutrition Interventions: Document food/fluid/supplement intake

## 2021-09-05 NOTE — PROGRESS NOTES
Physical Therapy Treatment Attempt     Chart reviewed. Attempted Physical Therapy Treatment, however, patient unable to be seen due to:  []  Nausea/vomiting  [x]  1305 Eating  []  Pain  []  Patient too lethargic  []  Off Unit for testing/procedure  []  Dialysis treatment in progress   []  Telemetry Results  [x]  5922 Other: RN in room providing hygiene care     Will follow up later as patient's schedule allows.    Thank you for this referral.    Rico Matos, PT, DPT

## 2021-09-05 NOTE — PROGRESS NOTES
Hospitalist Progress Note    Patient: Vel Borja MRN: [id number]  CSN: [id number]    YOB: 1932  Age: 80 y.o. Sex: female    DOA: 9/2/2021 LOS:  LOS: 3 days            Patient Active Problem List   Diagnosis Code    Hypertension I10    Diabetes ([address] E11.9    Fatigue R53.83    Hyperlipidemia E78.5    Dizziness R44    H/O myocardial infarction, greater than 8 weeks I25.2    Back pain M54.9    Aortic valve disorders I35.9    Lumbar spinal stenosis M48.061    Lumbar spondylosis M47.816    Radiculopathy of leg M54.10    CAP (community acquired pneumonia) J18.9    COVID-19 vaccine series completed Z92.29    Suspected COVID-19 virus infection Z20.822    CAD (coronary artery disease) I25.10    Dementia ([address] F03.90    Delirium R41.0    Acquired hypothyroidism E03.9        IMPRESSION and Plan:    Luis Mccollum is a 80 y.o. female with   Patient Active Problem List    Diagnosis Date Noted    CAP (community acquired pneumonia) 09/02/2021    COVID-19 vaccine series completed 09/02/2021    Suspected COVID-19 virus infection 09/02/2021    Dementia ([address] 09/02/2021    Delirium 09/02/2021    Acquired hypothyroidism 09/02/2021    CAD (coronary artery disease)     Lumbar spinal stenosis 01/15/2019    Lumbar spondylosis 01/15/2019    Radiculopathy of leg 01/15/2019    Aortic valve disorders 12/17/2013    Back pain 10/10/2013    Dizziness 06/13/2013    H/O myocardial infarction, greater than 8 weeks 06/13/2013    Hypertension     Diabetes ([address]     Fatigue     Hyperlipidemia      Principal Problem:    CAP (community acquired pneumonia) (9/2/2021)    Active Problems:    COVID-19 vaccine series completed (9/2/2021)      Suspected COVID-19 virus infection (9/2/2021)      Dementia ([address] (9/2/2021)      Delirium (9/2/2021)      Acquired hypothyroidism (9/2/2021)         88 y. o. female with diabetes, hypertension, CAD, hypothyroidism, hyperlipidemia, GERD presents to ER with concerns of dizziness, vomiting.        Dizziness -- improved. MRI brain no acute ds    Community-acquired pneumonia. Vs aspiriaton pneumonia-  Continue with ceftriaxone and azithromycin        Rapid Covid negative     CAD-  Continue with beta-blocker and statin     Hypertension- bp/hr stbale   DM-  Hold oral hypoglycemics, start on sliding scale insulin and diabetic diet hypothyroidism-  Continue Synthroid  TSH in normal range.     Dementia with delirium -       DVT prophylaxis with heparin     PT/OT        Patient's condition is fair    Needs SNF placement        Recommend to continue hospitalization. Discussed with patient. Chief Complaints:   Chief Complaint   Patient presents with    Abdominal Pain    Vomiting     SUBJECTIVE:  Pt is seen and examined. \" I need my  here\"    Review of systems:    Review of Systems   Constitutional: Negative for malaise/fatigue. HENT: Negative. Eyes: Negative. Respiratory: Positive for shortness of breath. Cardiovascular: Negative for chest pain, palpitations, orthopnea and leg swelling. Gastrointestinal: Negative. Negative for abdominal pain, diarrhea and heartburn. Genitourinary: Negative for dysuria and hematuria. Skin: Negative. Neurological: Positive for weakness. Psychiatric/Behavioral: Negative for depression, substance abuse and suicidal ideas. The patient is not nervous/anxious.         PE:  Patient Vitals for the past 24 hrs:   BP Temp Pulse Resp SpO2 Height   09/05/21 1120 (!) 174/62 98 °F (36.7 °C) 68 18 100 %    09/05/21 0723 (!) 171/63 98.4 °F (36.9 °C) 70 19 100 %    09/05/21 0331 (!) 123/52 98.2 °F (36.8 °C) 65 20 100 %    09/04/21 2214 (!) 151/69 98.2 °F (36.8 °C) 81 24 98 %    09/04/21 2114 (!) 199/66        09/04/21 2058 (!) 201/105 98.3 °F (36.8 °C) 95 24 100 %    09/04/21 1913 (!) 155/65 98.2 °F (36.8 °C) 65 16 100 %    09/04/21 1637      4' 7.91\" (1.42 m)   09/04/21 1546 (!) 156/69 97.9 °F (36.6 °C) 64 16   Intake/Output Summary (Last 24 hours) at 9/5/2021 1155  Last data filed at 9/5/2021 0025  Gross per 24 hour   Intake    Output 2 ml   Net -2 ml     Patient Vitals for the past 120 hrs:   Weight   09/02/21 0831 45.4 kg (100 lb)         Physical Exam  Vitals and nursing note reviewed. Constitutional:       General: She is in acute distress. Neck:      Vascular: No JVD. Cardiovascular:      Rate and Rhythm: Normal rate and regular rhythm. Heart sounds: Normal heart sounds. Pulmonary:      Effort: Respiratory distress present. Breath sounds: Normal breath sounds. Abdominal:      General: Bowel sounds are normal. There is no distension. Palpations: Abdomen is soft. Tenderness: There is no abdominal tenderness. There is no rebound. Musculoskeletal:         General: Normal range of motion. Cervical back: Normal range of motion and neck supple. Skin:     General: Skin is warm and dry. Neurological:      Mental Status: She is alert and oriented to person, place, and time. Psychiatric:         Mood and Affect: Affect normal.             Intake and Output:  Current Shift:  No intake/output data recorded.   Last three shifts:  09/03 1901 - 09/05 0700  In: -   Out: 18 [Urine:601]    Lab/Data Reviewed:  Recent Results (from the past 8 hour(s))   GLUCOSE, POC    Collection Time: 09/05/21  7:22 AM   Result Value Ref Range    Glucose (POC) 93 70 - 110 mg/dL   GLUCOSE, POC    Collection Time: 09/05/21 11:14 AM   Result Value Ref Range    Glucose (POC) 115 (H) 70 - 110 mg/dL     Medications:  Current Facility-Administered Medications   Medication Dose Route Frequency    acetaminophen (TYLENOL) tablet 650 mg  650 mg Oral Q6H PRN    hydrALAZINE (APRESOLINE) 20 mg/mL injection 20 mg  20 mg IntraVENous Q6H PRN    ondansetron (ZOFRAN) injection 4 mg  4 mg IntraVENous Q6H PRN    cefTRIAXone (ROCEPHIN) 2 g in sterile water (preservative free) 20 mL IV syringe  2 g IntraVENous Q24H    azithromycin (ZITHROMAX) 500 mg in 0.9% sodium chloride 250 mL (VIAL-MATE)  500 mg IntraVENous Q24H    gentamicin-prednisoLONE (PRED G) 0.3-1 % ophthalmic suspension 1 Drop (Patient Supplied)  1 Drop Both Eyes QID    levothyroxine (SYNTHROID) tablet 25 mcg  25 mcg Oral QHS    metoprolol succinate (TOPROL-XL) XL tablet 25 mg  25 mg Oral QHS    montelukast (SINGULAIR) tablet 10 mg  10 mg Oral QPM    multivitamin, tx-iron-ca-min (THERA-M w/ IRON) tablet 1 Tablet  1 Tablet Oral DAILY    omega-3 acid ethyl esters (LOVAZA) capsule 1 Capsule  1 Capsule Oral DAILY    pravastatin (PRAVACHOL) tablet 40 mg  40 mg Oral QHS    glucose chewable tablet 16 g  16 g Oral PRN    glucagon (GLUCAGEN) injection 1 mg  1 mg IntraMUSCular PRN    dextrose (D50W) injection syrg 25 g  50 mL IntraVENous PRN    insulin lispro (HUMALOG) injection   SubCUTAneous AC&HS    0.9% sodium chloride infusion  100 mL/hr IntraVENous CONTINUOUS    heparin (porcine) injection 5,000 Units  5,000 Units SubCUTAneous Q8H    telmisartan (MICARDIS) tablet 40 mg  40 mg Oral DAILY    And    hydroCHLOROthiazide (HYDRODIURIL) tablet 12.5 mg  12.5 mg Oral DAILY       Recent Results (from the past 24 hour(s))   GLUCOSE, POC    Collection Time: 09/04/21  1:36 PM   Result Value Ref Range    Glucose (POC) 139 (H) 70 - 110 mg/dL   GLUCOSE, POC    Collection Time: 09/04/21  3:47 PM   Result Value Ref Range    Glucose (POC) 165 (H) 70 - 110 mg/dL   GLUCOSE, POC    Collection Time: 09/04/21 10:14 PM   Result Value Ref Range    Glucose (POC) 118 (H) 70 - 475 mg/dL   METABOLIC PANEL, BASIC    Collection Time: 09/05/21  2:19 AM   Result Value Ref Range    Sodium 140 136 - 145 mmol/L    Potassium 3.4 (L) 3.5 - 5.5 mmol/L    Chloride 110 100 - 111 mmol/L    CO2 20 (L) 21 - 32 mmol/L    Anion gap 10 3.0 - 18 mmol/L    Glucose 105 (H) 74 - 99 mg/dL    BUN 27 (H) 7.0 - 18 MG/DL    Creatinine 0.99 0.6 - 1.3 MG/DL    BUN/Creatinine ratio 27 (H) 12 - 20      GFR est AA >60 >60 ml/min/1.73m2    GFR est non-AA 53 (L) >60 ml/min/1.73m2    Calcium 7.8 (L) 8.5 - 10.1 MG/DL   CBC W/O DIFF    Collection Time: 09/05/21  2:19 AM   Result Value Ref Range    WBC 12.7 4.6 - 13.2 K/uL    RBC 4.27 4.20 - 5.30 M/uL    HGB 11.2 (L) 12.0 - 16.0 g/dL    HCT 33.8 (L) 35.0 - 45.0 %    MCV 79.2 78.0 - 100.0 FL    MCH 26.2 24.0 - 34.0 PG    MCHC 33.1 31.0 - 37.0 g/dL    RDW 16.7 (H) 11.6 - 14.5 %    PLATELET 674 127 - 061 K/uL    MPV 11.2 9.2 - 11.8 FL   GLUCOSE, POC    Collection Time: 09/05/21  7:22 AM   Result Value Ref Range    Glucose (POC) 93 70 - 110 mg/dL   GLUCOSE, POC    Collection Time: 09/05/21 11:14 AM   Result Value Ref Range    Glucose (POC) 115 (H) 70 - 110 mg/dL       Procedures/imaging: see electronic medical records for all procedures/Xrays and details which were not copied into this note but were reviewed prior to creation of Plan    Ashlee Rivas MD   9/5/2021, 1:00 PM

## 2021-09-05 NOTE — PROGRESS NOTES
OCCUPATIONAL THERAPY EVALUATION    Problem: Self Care Deficits Care Plan (Adult)  Goal: *Acute Goals and Plan of Care (Insert Text)  Outcome: Progressing Towards Goal  Note:   Initial Occupational Therapy Goals (9/5/2021) Within 7 day(s):    1. Patient will perform perform grooming standing sink level for 5 minutes for increased independence in ADLs. 2. Patient will perform UB dressing with min A seated EOB for increased independence with ADLs. 3. Patient will perform LB dressing with min A & A/E PRN for increased independence with ADLs. 4. Patient will perform all aspects of toileting with min A for increased independence with ADLs. 5. Patient will perform LE ADLs utilizing body mechanics & adaptive strategies with 1 verbal cue for increased safety in ADLs. 6. Patient will independently apply energy conservation techniques with no verbal cue(s) for increased independence with ADLs. Patient: Luis Bloom (89 y.o. female)  Date: 9/5/2021  Primary Diagnosis: CAP (community acquired pneumonia) [J18.9]  Suspected COVID-19 virus infection [Z20.822]  COVID-19 vaccine series completed [Z92.29]        Precautions:   Fall  PLOF: Unable to obtain history from pt, chart review indicates pt was using FWW to ambulate, lives with , was completing ADLs at a min A level. ASSESSMENT :  Based on the objective data described below, the patient presents with decreased independence in ADL and self care tasks 2/2 weakness, confusion, decreased attention, decreased need for safety. Pt presents supine in bed, pt is initially fixated on getting a phone to call the doctor and reach her . Pt is very talkative however we are able to get pt to sit up at the EOB, requires mod A, pt stating she has a lot of pain in her knees. Once seated EOB, pt is able to put R shoe on with min A, needs max A for L shoe with back, has a hard time bringing leg up and bending over.  Once pt is sitting upright with shoes on, begins to get very anxious about getting her phone to reach her  and the doctor, tries to reach out for tray to pull towards her. Pt is unable to see where her phone is with verbal and visual cues to locate however unsure if this is due to visual field deficit or pts anxiety and frantic looking. Indicated pt it is safety to lay back down while we assist her in dialing phone number for pt to talk to her . Pt left in supine, all needs in reach. .    Education: Pt educated in role of OT in acute setting    Patient will benefit from skilled intervention to address the above impairments. Patient's rehabilitation potential is considered to be Fair  Factors which may influence rehabilitation potential include:   []             None noted  [x]             Mental ability/status  [x]             Medical condition  [x]             Home/family situation and support systems  [x]             Safety awareness  [x]             Pain tolerance/management  []             Other:      PLAN : Pt will be seen by skilled OT daily  Recommendations and Planned Interventions:   [x]               Self Care Training                  [x]      Therapeutic Activities  [x]               Functional Mobility Training   [x]      Cognitive Retraining  [x]               Therapeutic Exercises           [x]      Endurance Activities  [x]               Balance Training                    [x]      Neuromuscular Re-Education  [x]               Visual/Perceptual Training     [x]      Home Safety Training  [x]               Patient Education                   [x]      Family Training/Education  []               Other (comment):    Frequency/Duration: Patient will be followed by occupational therapy daily to address goals. Discharge Recommendations: Skilled Nursing Facility  Further Equipment Recommendations for Discharge: N/A     SUBJECTIVE:   Patient stated I need a phone.     OBJECTIVE DATA SUMMARY:     Past Medical History:   Diagnosis Date    Adverse effect of anesthesia     very, very sleepy with Anesthesia    Adverse effect of anesthesia     decreased heartrate with colonoscopy    Anemia     Arthritis     osteoarthritis    CAD (coronary artery disease)     Depression     Diabetes ([facility] [address]     adult onset    Difficult intubation     went bradycardc with symptoms, had diificuly waking up for 24 hours    Foot fracture 2008    right    GERD (gastroesophageal reflux disease)     Heart attack ([facility] [address] 2005    Hypercholesterolemia     Hyperlipidemia     Hypertension     Ill-defined condition 10/2018    reddened itchy bumps on lower abdomen after steroid injections in October, itchy patient states, very red, slightly opened with yellow looking head on them.     Ill-defined condition     history of frequent uti's, especially with stress and pressure on the stomach    Ill-defined condition     history of post nasal drip    Ill-defined condition     history of frequent small BM's at night    Ill-defined condition     recurren bladder infection    Menopause     Mitral regurgitation     Myocardial infarct (HCC)     Nausea & vomiting     severe nausea    Sick sinus syndrome (HCC)     Thyroid disease      Past Surgical History:   Procedure Laterality Date    HX CATARACT REMOVAL Right     HX GI      colonosopy    HX GYN      bartholin cyst    HX HEART CATHETERIZATION  2005    HX HEENT Bilateral     macular puckering    HX HEENT      sinus surgery    HX KNEE ARTHROSCOPY Right     HX ORTHOPAEDIC      cortisone shots to both knees    HX OTHER SURGICAL      shoulder surgery    HX OTHER SURGICAL      eye surgery    HX OTHER SURGICAL  many years ago    sinus surgery    HX OTHER SURGICAL      knee surgery    HX OTHER SURGICAL  10-22 & 10-29    Epidural steroid injection    HX SHOULDER ARTHROSCOPY Right           Barriers to Learning/Limitations: yes;  cognitive  Compensate with: visual, verbal, tactile, kinesthetic cues/model    Home Situation:   Home Situation  Home Environment: Private residence  # Steps to Enter: 3  Rails to Enter: Yes  Hand Rails : Bilateral  One/Two Story Residence: One story  Living Alone: No  Support Systems: Spouse/Significant Other/Partner  Patient Expects to be Discharged to[de-identified] House  Current DME Used/Available at Home: Cane, straight  [x]  Right hand dominant   []  Left hand dominant    Cognitive/Behavioral Status:  Neurologic State: Confused  Orientation Level: Oriented to place  Cognition: Follows commands  Safety/Judgement: Fall prevention;Decreased awareness of environment    Skin: intact  Edema: none noted     Vision/Perceptual:    Tracking: Able to track stimulus in all quadrants w/o difficulty      Coordination: BUE  Coordination: Generally decreased, functional  Fine Motor Skills-Upper: Left Intact; Right Intact    Gross Motor Skills-Upper: Left Intact; Right Intact    Balance:  Sitting: Intact    Strength: BUE  Strength: Generally decreased, functional      Tone & Sensation: BUE  Tone: Normal  Sensation: Intact    Range of Motion: BUE  AROM: Within functional limits    Functional Mobility and Transfers for ADLs:  Bed Mobility:     Supine to Sit: Moderate assistance  Sit to Supine: Minimum assistance  Scooting: Minimum assistance  Transfers:        ADL Assessment:   Feeding: Setup    Oral Facial Hygiene/Grooming: Setup    Bathing: Maximum assistance    Upper Body Dressing: Moderate assistance    Lower Body Dressing: Maximum assistance    Toileting: Total assistance      ADL Intervention:      Lower Body Dressing Assistance  Dressing Assistance: Maximum assistance  Slip on Shoes with Back: Maximum assistance  Leg Crossed Method Used: No  Position Performed: Seated edge of bed      Cognitive Retraining  Orientation Retraining: Reorienting  Executive Functions: Managing time; Executing cognitive plans  Attention to Task: Distractibility  Maintains Attention For (Time): 30 seconds  Safety/Judgement: Fall prevention;Decreased awareness of environment      Pain:  Pain level pre-treatment: 5/10   Pain level post-treatment: 5/10   Pain Intervention(s): Medication provided by Nursing (see MAR); Rest, Ice, Repositioning   Response to intervention: Nurse notified, See doc flow sheet    Activity Tolerance:   Low. Patient able to sit EOB for 5 minutes with attempt at LB dressing with shoes   Patient requires intermittent rest breaks. Patient limited by pain, confusion, weakness. Patient unsteady. Please refer to the flowsheet for vital signs taken during this treatment. After treatment:   [] Patient left in no apparent distress sitting up in chair  [x] Patient left in no apparent distress in bed  [x] Call bell left within reach  [x] Nursing notified  [] Caregiver present  [] Bed alarm activated    COMMUNICATION/EDUCATION:   [x] Role of Occupational Therapy in the acute care setting  [x] Home safety education was provided and the patient/caregiver indicated understanding. [x] Patient/family have participated as able in goal setting and plan of care. [x] Patient/family agree to work toward stated goals and plan of care. [] Patient understands intent and goals of therapy, but is neutral about his/her participation. [] Patient is unable to participate in goal setting and plan of care. Thank you for this referral.  Marcial Breen OTD, OTR/L   Time Calculation: 18 mins    Eval Complexity: History: MEDIUM Complexity : Expanded review of history including physical, cognitive and psychosocial  history ; Examination: MEDIUM Complexity : 3-5 performance deficits relating to physical, cognitive , or psychosocial skils that result in activity limitations and / or participation restrictions; Decision Making:MEDIUM Complexity : Patient may present with comorbidities that affect occupational performnce.  Miniml to moderate modification of tasks or assistance (eg, physical or verbal ) with assesment(s) is necessary to enable patient to complete evaluation

## 2021-09-05 NOTE — PROGRESS NOTES
1430-Pt said IV was painful it was removed, made attempt in right forearm, pt yelled in pain, did not get IV, Medic arnie said he would come by.      1925-Verbal shift change report given to [employer] RN by Hemant Dixon RN. Report included the following information SBAR, Kardex, Summary, Intake/Output and MAR.

## 2021-09-05 NOTE — ROUTINE PROCESS
Bedside and Verbal shift change report given to [address] [place] [address] [address] (oncoming nurse) by Ramón Spain RN (offgoing nurse). Report included the following information SBAR, Kardex, MAR and Recent Results.

## 2021-09-05 NOTE — PROGRESS NOTES
Bedside and verbal report given to TOYA Shaw RN (oncoming nurse) by Sebastian Prader, RN  (off going nurse). Report included the following information SBAR, Kardex, OR Summary, Intake/Output, and MAR. Pt complains of dizziness, nausea and sweating. Noticed pt dry vomoting scant amounts of emesis. Dry cough, non productive. PRN Hydralazine given for elevated BP. PRN Zofran given for nausea. Bedside and verbal report given by (off going nurse) TOYA Shaw RN to (oncoming nurse) [address] RN. Report included the following information SBAR, Kardex, OR Summary, Intake/Output, and MAR.

## 2021-09-06 LAB
GLUCOSE BLD STRIP.AUTO-MCNC: 103 MG/DL (ref 70–110)
GLUCOSE BLD STRIP.AUTO-MCNC: 113 MG/DL (ref 70–110)
GLUCOSE BLD STRIP.AUTO-MCNC: 82 MG/DL (ref 70–110)
GLUCOSE BLD STRIP.AUTO-MCNC: 94 MG/DL (ref 70–110)

## 2021-09-06 PROCEDURE — 65270000029 HC RM PRIVATE

## 2021-09-06 PROCEDURE — 74011250636 HC RX REV CODE- 250/636: Performed by: HOSPITALIST

## 2021-09-06 PROCEDURE — 74011000250 HC RX REV CODE- 250: Performed by: PHYSICIAN ASSISTANT

## 2021-09-06 PROCEDURE — 74011250636 HC RX REV CODE- 250/636: Performed by: PHYSICIAN ASSISTANT

## 2021-09-06 PROCEDURE — 74011000250 HC RX REV CODE- 250: Performed by: FAMILY MEDICINE

## 2021-09-06 PROCEDURE — 74011250637 HC RX REV CODE- 250/637: Performed by: FAMILY MEDICINE

## 2021-09-06 PROCEDURE — 74011250637 HC RX REV CODE- 250/637: Performed by: HOSPITALIST

## 2021-09-06 PROCEDURE — 74011250637 HC RX REV CODE- 250/637: Performed by: INTERNAL MEDICINE

## 2021-09-06 PROCEDURE — 82962 GLUCOSE BLOOD TEST: CPT

## 2021-09-06 PROCEDURE — 92526 ORAL FUNCTION THERAPY: CPT

## 2021-09-06 PROCEDURE — 77010033678 HC OXYGEN DAILY

## 2021-09-06 PROCEDURE — 74011250636 HC RX REV CODE- 250/636: Performed by: FAMILY MEDICINE

## 2021-09-06 RX ORDER — LIDOCAINE 4 G/100G
2 PATCH TOPICAL EVERY 24 HOURS
Status: DISCONTINUED | OUTPATIENT
Start: 2021-09-06 | End: 2021-09-09 | Stop reason: HOSPADM

## 2021-09-06 RX ORDER — CALCIUM CARB/MAGNESIUM CARB 311-232MG
10 TABLET ORAL
Status: DISCONTINUED | OUTPATIENT
Start: 2021-09-06 | End: 2021-09-09 | Stop reason: HOSPADM

## 2021-09-06 RX ADMIN — DOXYLAMINE SUCCINATE: 25 TABLET ORAL at 16:20

## 2021-09-06 RX ADMIN — ONDANSETRON 4 MG: 2 INJECTION INTRAMUSCULAR; INTRAVENOUS at 00:19

## 2021-09-06 RX ADMIN — DOXYLAMINE SUCCINATE: 25 TABLET ORAL at 22:00

## 2021-09-06 RX ADMIN — LEVOTHYROXINE SODIUM 25 MCG: 0.03 TABLET ORAL at 21:39

## 2021-09-06 RX ADMIN — METOPROLOL SUCCINATE 25 MG: 25 TABLET, EXTENDED RELEASE ORAL at 21:39

## 2021-09-06 RX ADMIN — CEFTRIAXONE SODIUM 2 G: 2 INJECTION, POWDER, FOR SOLUTION INTRAMUSCULAR; INTRAVENOUS at 11:48

## 2021-09-06 RX ADMIN — HEPARIN SODIUM 5000 UNITS: 5000 INJECTION INTRAVENOUS; SUBCUTANEOUS at 08:43

## 2021-09-06 RX ADMIN — HEPARIN SODIUM 5000 UNITS: 5000 INJECTION INTRAVENOUS; SUBCUTANEOUS at 00:13

## 2021-09-06 RX ADMIN — HEPARIN SODIUM 5000 UNITS: 5000 INJECTION INTRAVENOUS; SUBCUTANEOUS at 16:20

## 2021-09-06 RX ADMIN — ACETAMINOPHEN 650 MG: 325 TABLET ORAL at 00:13

## 2021-09-06 RX ADMIN — TELMISARTAN 40 MG: 40 TABLET ORAL at 08:43

## 2021-09-06 RX ADMIN — Medication 10 MG: at 04:04

## 2021-09-06 RX ADMIN — HYDROCHLOROTHIAZIDE 12.5 MG: 25 TABLET ORAL at 08:43

## 2021-09-06 RX ADMIN — PRAVASTATIN SODIUM 40 MG: 20 TABLET ORAL at 21:39

## 2021-09-06 RX ADMIN — ACETAMINOPHEN 650 MG: 325 TABLET ORAL at 08:43

## 2021-09-06 RX ADMIN — SODIUM CHLORIDE 100 ML/HR: 900 INJECTION, SOLUTION INTRAVENOUS at 04:16

## 2021-09-06 RX ADMIN — ACETAMINOPHEN 650 MG: 325 TABLET ORAL at 16:20

## 2021-09-06 RX ADMIN — Medication 10 MG: at 21:39

## 2021-09-06 RX ADMIN — OMEGA-3-ACID ETHYL ESTERS 1 CAPSULE: 1 CAPSULE, LIQUID FILLED ORAL at 08:43

## 2021-09-06 RX ADMIN — MONTELUKAST 10 MG: 10 TABLET, FILM COATED ORAL at 17:00

## 2021-09-06 RX ADMIN — DOXYLAMINE SUCCINATE: 25 TABLET ORAL at 08:42

## 2021-09-06 RX ADMIN — AZITHROMYCIN MONOHYDRATE 500 MG: 500 INJECTION, POWDER, LYOPHILIZED, FOR SOLUTION INTRAVENOUS at 11:47

## 2021-09-06 RX ADMIN — MULTIPLE VITAMINS W/ MINERALS TAB 1 TABLET: TAB at 08:43

## 2021-09-06 NOTE — PROGRESS NOTES
Problem: Dysphagia (Adult)  Goal: *Acute Goals and Plan of Care (Insert Text)  Description: Patient will:  1. Tolerate soft & bite-sized diet with thin liquids without overt s/sx of aspiration under SLP supervision. 2. Tolerate diet upgrade without overt s/sx of aspiration under SLP supervision. 3. Utilize compensatory swallow strategies of small bite/sip, alternate liquid/solid with min cues in 4/5 trials. 4. Complete an objective swallow study (i.e., MBSS) to assess swallow integrity, r/o aspiration, and determine of safest LRD, min A.      Rec:   Soft & bite-sized diet, thin liquids  Aspiration precautions  HOB >45 during po intake, remain >30 for 30-45 minutes after po   Small bites/sips; alternate liquid/solid, slow feeding rate   Oral care TID  Meds WHOLE in puree    Outcome: Resolved/Met     SPEECH LANGUAGE PATHOLOGY DYSPHAGIA TREATMENT & DISCHARGE    Patient: Luis Landers (80 y.o. female)  Date: 9/6/2021  Diagnosis: CAP (community acquired pneumonia) [J18.9]  Suspected COVID-19 virus infection [Z20.822]  COVID-19 vaccine series completed [Z92.29] CAP (community acquired pneumonia)       Precautions: Fall  PLOF: Per H&P    ASSESSMENT:  Pt seen for diet tolerance this AM,  present in room. Pt c/o knee pain (with patch on knee) - RN aware per discussion following session. Pt and  endorse tolerance of meal, though  reports decreased appetite. Pt accepting of therapeutic PO trials soft & bite-sized texture and thin liquids without s/sx airway compromise. Oropharyngeal swallow appears WFL. Swallow appears timely, adequate laryngeal elevation noted via palpation, no change in vocal/resp quality appreciated via cervical auscultation. Recommend continue current soft & bite-sized diet with thin liquids, meds whole in applesauce - d/w RN. Maximum therapeutic gains met. Safest least restrictive diet achieved in current inpatient setting. SLP will sign off at this time.  Please reconsult should further concerns arise. Progression toward goals:  [x]         Improving appropriately - goals met/approximated  []         Not making progress/Not appropriate - will d/c POC     PLAN:  Recommendations and Planned Interventions:  Maximum therapeutic gains met; safest, least restrictive diet achieved. D/C ST intervention at this time. Discharge Recommendations: To Be Determined     SUBJECTIVE:   Patient stated My knee hurts. (RN aware and pt with patch on knee)    OBJECTIVE:   Cognitive and Communication Status:  Neurologic State: Alert  Orientation Level: Oriented to person, Oriented to place  Cognition: Follows commands  Perception: Appears intact  Perseveration: Perseverates during conversation  Safety/Judgement: Fall prevention, Decreased awareness of environment  Dysphagia Treatment:  Oral Assessment:  Oral Assessment  Labial: No impairment  Dentition: Natural, Limited  Oral Hygiene: Fair  Lingual: No impairment  Velum: Unable to visualize  Mandible: No impairment  P.O. Trials:   Patient Position: HOB 55*   Vocal quality prior to P.O.: Low volume   Consistency Presented:  Thin liquid, Puree, Solid, Pill/Tablet   How Presented: Self-fed/presented, Straw, Successive swallows, Spoon       Bolus Acceptance: No impairment   Bolus Formation/Control: Impaired   Type of Impairment: Delayed, Mastication   Propulsion: No impairment   Oral Residue: None   Initiation of Swallow: No impairment   Laryngeal Elevation: Functional   Aspiration Signs/Symptoms: None   Pharyngeal Phase Characteristics: No impairment, issues, or problems    Effective Modifications: None   Cues for Modifications: None         Oral Phase Severity: Mild   Pharyngeal Phase Severity : No impairment       PAIN:  Pain level pre-treatment: 0/10   Pain level post-treatment: 0/10     After treatment:   []              Patient left in no apparent distress sitting up in chair  [x]              Patient left in no apparent distress in bed  [x] Call bell left within reach  [x]              Nursing notified  [x]               present  []              Bed alarm activated      COMMUNICATION/EDUCATION:   [x] Aspiration precautions; swallow safety; compensatory techniques  []        Patient unable to participate in education; education ongoing with staff  []  Posted safety precautions in patient's room.   [] Oral-motor/laryngeal strengthening exercises    Thank you for this referral.    Jonnathan Montoya M.S., CCC-SLP  Speech-Language Pathologist    Time Calculation: 10 mins

## 2021-09-06 NOTE — PROGRESS NOTES
Hospitalist Progress Note    Patient: Ellen Donahue MRN: [id number]  [facility]: [id number]    YOB: 1932  Age: 80 y.o. Sex: female    DOA: 9/2/2021 LOS:  LOS: 4 days            Patient Active Problem List   Diagnosis Code    Hypertension I10    Diabetes ([address] E11.9    Fatigue R53.83    Hyperlipidemia E78.5    Dizziness R42    H/O myocardial infarction, greater than 8 weeks I25.2    Back pain M54.9    Aortic valve disorders I35.9    Lumbar spinal stenosis M48.061    Lumbar spondylosis M47.816    Radiculopathy of leg M54.10    CAP (community acquired pneumonia) J18.9    COVID-19 vaccine series completed Z92.29    Suspected COVID-19 virus infection Z20.822    CAD (coronary artery disease) I25.10    Dementia ([address] F03.90    Delirium R41.0    Acquired hypothyroidism E03.9        IMPRESSION and Plan:    Luis Santillan is a 80 y.o. female with   Patient Active Problem List    Diagnosis Date Noted    CAP (community acquired pneumonia) 09/02/2021    COVID-19 vaccine series completed 09/02/2021    Suspected COVID-19 virus infection 09/02/2021    Dementia ([address] 09/02/2021    Delirium 09/02/2021    Acquired hypothyroidism 09/02/2021    CAD (coronary artery disease)     Lumbar spinal stenosis 01/15/2019    Lumbar spondylosis 01/15/2019    Radiculopathy of leg 01/15/2019    Aortic valve disorders 12/17/2013    Back pain 10/10/2013    Dizziness 06/13/2013    H/O myocardial infarction, greater than 8 weeks 06/13/2013    Hypertension     Diabetes ([address]     Fatigue     Hyperlipidemia      Principal Problem:    CAP (community acquired pneumonia) (9/2/2021)    Active Problems:    COVID-19 vaccine series completed (9/2/2021)      Suspected COVID-19 virus infection (9/2/2021)      Dementia ([address] (9/2/2021)      Delirium (9/2/2021)      Acquired hypothyroidism (9/2/2021)         88 y. o. female with diabetes, hypertension, CAD, hypothyroidism, hyperlipidemia, GERD presents to ER with concerns of dizziness, vomiting.        Dizziness -- improved. MRI brain no acute ds    Community-acquired pneumonia. Vs aspiriaton pneumonia-  Continue with ceftriaxone and azithromycin        Rapid Covid negative     CAD-  Continue with beta-blocker and statin     Hypertension- bp/hr stbale   DM-  Hold oral hypoglycemics, start on sliding scale insulin and diabetic diet hypothyroidism- Continue Synthroid        Dementia with delirium -       DVT prophylaxis with heparin     PT/OT        Patient's condition is fair    Needs SNF placement  D/w  and updated    Recommend to continue hospitalization. Discussed with patient and     Chief Complaints:   Chief Complaint   Patient presents with    Abdominal Pain    Vomiting     SUBJECTIVE:  Pt is seen and examined.  is at bedside    Resting comofrtably    Denies any new pain    Review of systems:    Review of Systems   Constitutional: Negative for malaise/fatigue. HENT: Negative. Eyes: Negative. Respiratory: Negative for shortness of breath. Cardiovascular: Negative for chest pain, palpitations and orthopnea. Gastrointestinal: Negative. Negative for abdominal pain, diarrhea and heartburn. Genitourinary: Negative for dysuria and hematuria. Skin: Negative. Neurological: Positive for weakness. Psychiatric/Behavioral: Negative for depression, substance abuse and suicidal ideas. The patient is not nervous/anxious.         PE:  Patient Vitals for the past 24 hrs:   BP Temp Pulse Resp SpO2 Weight   09/06/21 1121 (!) 168/58 98.3 °F (36.8 °C) 71 17 100 %    09/06/21 0715 (!) 175/88 98.3 °F (36.8 °C) 70 19 100 %    09/06/21 0438 (!) 147/53 97.9 °F (36.6 °C) 61 18 99 %    09/06/21 0304 (!) 159/71 98.4 °F (36.9 °C) 62 18 100 %    09/05/21 2230 (!) 151/55 97.6 °F (36.4 °C) 70 16 98 % 48.7 kg (107 lb 4.8 oz)   09/05/21 1909 (!) 140/49 98 °F (36.7 °C) 81 18 99 %    09/05/21 1600 (!) 118/56 98.8 °F (37.1 °C) 81 18 99 %        Intake/Output Summary (Last 24 hours) at 9/6/2021 1152  Last data filed at 9/5/2021 1845  Gross per 24 hour   Intake 100 ml   Output    Net 100 ml     Patient Vitals for the past 120 hrs:   Weight   09/02/21 0831 45.4 kg (100 lb)   09/05/21 2230 48.7 kg (107 lb 4.8 oz)         Physical Exam  Vitals and nursing note reviewed. Constitutional:       General: She is in acute distress. Neck:      Vascular: No JVD. Cardiovascular:      Rate and Rhythm: Normal rate and regular rhythm. Heart sounds: Normal heart sounds. Pulmonary:      Effort: Respiratory distress present. Breath sounds: Normal breath sounds. Abdominal:      General: Bowel sounds are normal. There is no distension. Palpations: Abdomen is soft. Tenderness: There is no abdominal tenderness. There is no rebound. Musculoskeletal:         General: Normal range of motion. Cervical back: Normal range of motion and neck supple. Skin:     General: Skin is warm and dry. Neurological:      Mental Status: She is alert and oriented to person, place, and time. Psychiatric:         Mood and Affect: Affect normal.             Intake and Output:  Current Shift:  No intake/output data recorded.   Last three shifts:  09/04 1901 - 09/06 0700  In: 300 [P.O.:300]  Out: 2 [Urine:1]    Lab/Data Reviewed:  Recent Results (from the past 8 hour(s))   GLUCOSE, POC    Collection Time: 09/06/21  7:18 AM   Result Value Ref Range    Glucose (POC) 94 70 - 110 mg/dL   GLUCOSE, POC    Collection Time: 09/06/21 11:23 AM   Result Value Ref Range    Glucose (POC) 82 70 - 110 mg/dL     Medications:  Current Facility-Administered Medications   Medication Dose Route Frequency    lidocaine 4 % patch 2 Patch  2 Patch TransDERmal J81Y    methyl salicylate-menthol (BENGAY) 15-10 % cream   Topical TID    melatonin (rapid dissolve) tablet 10 mg  10 mg Oral QHS    acetaminophen (TYLENOL) tablet 650 mg  650 mg Oral Q6H PRN    hydrALAZINE (APRESOLINE) 20 mg/mL injection 20 mg  20 mg IntraVENous Q6H PRN    ondansetron (ZOFRAN) injection 4 mg  4 mg IntraVENous Q6H PRN    cefTRIAXone (ROCEPHIN) 2 g in sterile water (preservative free) 20 mL IV syringe  2 g IntraVENous Q24H    azithromycin (ZITHROMAX) 500 mg in 0.9% sodium chloride 250 mL (VIAL-MATE)  500 mg IntraVENous Q24H    gentamicin-prednisoLONE (PRED G) 0.3-1 % ophthalmic suspension 1 Drop (Patient Supplied)  1 Drop Both Eyes QID    levothyroxine (SYNTHROID) tablet 25 mcg  25 mcg Oral QHS    metoprolol succinate (TOPROL-XL) XL tablet 25 mg  25 mg Oral QHS    montelukast (SINGULAIR) tablet 10 mg  10 mg Oral QPM    multivitamin, tx-iron-ca-min (THERA-M w/ IRON) tablet 1 Tablet  1 Tablet Oral DAILY    omega-3 acid ethyl esters (LOVAZA) capsule 1 Capsule  1 Capsule Oral DAILY    pravastatin (PRAVACHOL) tablet 40 mg  40 mg Oral QHS    glucose chewable tablet 16 g  16 g Oral PRN    glucagon (GLUCAGEN) injection 1 mg  1 mg IntraMUSCular PRN    dextrose (D50W) injection syrg 25 g  50 mL IntraVENous PRN    insulin lispro (HUMALOG) injection   SubCUTAneous AC&HS    0.9% sodium chloride infusion  100 mL/hr IntraVENous CONTINUOUS    heparin (porcine) injection 5,000 Units  5,000 Units SubCUTAneous Q8H    telmisartan (MICARDIS) tablet 40 mg  40 mg Oral DAILY    And    hydroCHLOROthiazide (HYDRODIURIL) tablet 12.5 mg  12.5 mg Oral DAILY       Recent Results (from the past 24 hour(s))   GLUCOSE, POC    Collection Time: 09/05/21  4:03 PM   Result Value Ref Range    Glucose (POC) 106 70 - 110 mg/dL   GLUCOSE, POC    Collection Time: 09/06/21  7:18 AM   Result Value Ref Range    Glucose (POC) 94 70 - 110 mg/dL   GLUCOSE, POC    Collection Time: 09/06/21 11:23 AM   Result Value Ref Range    Glucose (POC) 82 70 - 110 mg/dL       Procedures/imaging: see electronic medical records for all procedures/Xrays and details which were not copied into this note but were reviewed prior to creation of Plan    Francheska Hawk MD   9/6/2021, 1:00 PM

## 2021-09-06 NOTE — PROGRESS NOTES
Problem: Falls - Risk of  Goal: *Absence of Falls  Description: Document Vance Osei Fall Risk and appropriate interventions in the flowsheet. Outcome: Progressing Towards Goal  Note: Fall Risk Interventions:  Mobility Interventions: Patient to call before getting OOB    Mentation Interventions: Adequate sleep, hydration, pain control, Bed/chair exit alarm    Medication Interventions: Patient to call before getting OOB    Elimination Interventions: Call light in reach              Problem: Pressure Injury - Risk of  Goal: *Prevention of pressure injury  Description: Document Marc Scale and appropriate interventions in the flowsheet.   Outcome: Progressing Towards Goal  Note: Pressure Injury Interventions:  Sensory Interventions: Assess changes in LOC    Moisture Interventions: Internal/External urinary devices    Activity Interventions: Pressure redistribution bed/mattress(bed type)    Mobility Interventions: PT/OT evaluation    Nutrition Interventions: Document food/fluid/supplement intake

## 2021-09-06 NOTE — PROGRESS NOTES
Physical Therapy Treatment Attempt     Chart reviewed. Attempted Physical Therapy Treatment, however, patient unable to be seen due to:  []  Nausea/vomiting  []  Eating  []  Pain  []  Patient too lethargic  []  Off Unit for testing/procedure  []  Dialysis treatment in progress   []  Telemetry Results  [x] 1116 Other: Pt sleeping soundly, spouse at bedside reporting that pt had difficulty sleeping last night, has been in pain this morning. [address] at bedside providing hygiene care. Will allow pt to rest and follow up later as patient's schedule allows.    Thank you for this referral.    Geo Virgen, PT, DPT

## 2021-09-06 NOTE — PROGRESS NOTES
1211  Assumed care of pt at this time. Assessment complete. Pt alert and oriented x 3  at bedside. Shows no sign of distress. Fall risk arm band in place. Denies SOB and chest pain. Pt lungs clear bilaterally. Cap refill  less than 3 seconds. Pt denies numbness and tingling to all extremities. Stated pain 10/10 to both knees. Lidocaine patch in place and bengay applied  Pt has 20 G IV L forearm. Pt has no dressing. Skin intact. On heparin for VTE. Incentive spirometer at bedside. Pt encouraged to continue use of IS. Pt verbalized understanding. Call light and possessions within reach. Bed locked and in low position. Will continue to monitor. [address] 157 changed. BM today. New brief applied     1230  Ice packs applied to both knees. Spouse at bedside. Call light and possessions within reach    1600  Paged dr Erica Hernandez in regard to pt  requesting to see  Nurse will inform.  Awaiting return call

## 2021-09-07 LAB
GLUCOSE BLD STRIP.AUTO-MCNC: 105 MG/DL (ref 70–110)
GLUCOSE BLD STRIP.AUTO-MCNC: 133 MG/DL (ref 70–110)
GLUCOSE BLD STRIP.AUTO-MCNC: 85 MG/DL (ref 70–110)
GLUCOSE BLD STRIP.AUTO-MCNC: 87 MG/DL (ref 70–110)

## 2021-09-07 PROCEDURE — 82962 GLUCOSE BLOOD TEST: CPT

## 2021-09-07 PROCEDURE — 2709999900 HC NON-CHARGEABLE SUPPLY

## 2021-09-07 PROCEDURE — 74011250636 HC RX REV CODE- 250/636: Performed by: HOSPITALIST

## 2021-09-07 PROCEDURE — 77010033678 HC OXYGEN DAILY

## 2021-09-07 PROCEDURE — 74011000250 HC RX REV CODE- 250: Performed by: FAMILY MEDICINE

## 2021-09-07 PROCEDURE — 74011250637 HC RX REV CODE- 250/637: Performed by: HOSPITALIST

## 2021-09-07 PROCEDURE — 74011250636 HC RX REV CODE- 250/636: Performed by: PHYSICIAN ASSISTANT

## 2021-09-07 PROCEDURE — 74011000250 HC RX REV CODE- 250: Performed by: PHYSICIAN ASSISTANT

## 2021-09-07 PROCEDURE — 65270000029 HC RM PRIVATE

## 2021-09-07 PROCEDURE — 74011250637 HC RX REV CODE- 250/637: Performed by: INTERNAL MEDICINE

## 2021-09-07 PROCEDURE — 99221 1ST HOSP IP/OBS SF/LOW 40: CPT | Performed by: NURSE PRACTITIONER

## 2021-09-07 PROCEDURE — 74011250637 HC RX REV CODE- 250/637: Performed by: FAMILY MEDICINE

## 2021-09-07 RX ORDER — POTASSIUM CHLORIDE 20 MEQ/1
40 TABLET, EXTENDED RELEASE ORAL
Status: COMPLETED | OUTPATIENT
Start: 2021-09-07 | End: 2021-09-07

## 2021-09-07 RX ADMIN — HYDROCHLOROTHIAZIDE 12.5 MG: 25 TABLET ORAL at 09:35

## 2021-09-07 RX ADMIN — TELMISARTAN 40 MG: 40 TABLET ORAL at 09:35

## 2021-09-07 RX ADMIN — HEPARIN SODIUM 5000 UNITS: 5000 INJECTION INTRAVENOUS; SUBCUTANEOUS at 09:37

## 2021-09-07 RX ADMIN — ALUMINUM HYDROXIDE AND MAGNESIUM HYDROXIDE 15 ML: 200; 200 SUSPENSION ORAL at 13:05

## 2021-09-07 RX ADMIN — HEPARIN SODIUM 5000 UNITS: 5000 INJECTION INTRAVENOUS; SUBCUTANEOUS at 00:32

## 2021-09-07 RX ADMIN — SODIUM CHLORIDE 100 ML/HR: 900 INJECTION, SOLUTION INTRAVENOUS at 23:22

## 2021-09-07 RX ADMIN — Medication 10 MG: at 21:19

## 2021-09-07 RX ADMIN — AZITHROMYCIN MONOHYDRATE 500 MG: 500 INJECTION, POWDER, LYOPHILIZED, FOR SOLUTION INTRAVENOUS at 13:12

## 2021-09-07 RX ADMIN — MULTIPLE VITAMINS W/ MINERALS TAB 1 TABLET: TAB at 09:34

## 2021-09-07 RX ADMIN — SODIUM CHLORIDE 100 ML/HR: 900 INJECTION, SOLUTION INTRAVENOUS at 02:24

## 2021-09-07 RX ADMIN — MONTELUKAST 10 MG: 10 TABLET, FILM COATED ORAL at 17:36

## 2021-09-07 RX ADMIN — CEFTRIAXONE SODIUM 2 G: 2 INJECTION, POWDER, FOR SOLUTION INTRAMUSCULAR; INTRAVENOUS at 13:06

## 2021-09-07 RX ADMIN — OMEGA-3-ACID ETHYL ESTERS 1 CAPSULE: 1 CAPSULE, LIQUID FILLED ORAL at 09:33

## 2021-09-07 RX ADMIN — LEVOTHYROXINE SODIUM 25 MCG: 0.03 TABLET ORAL at 21:19

## 2021-09-07 RX ADMIN — METOPROLOL SUCCINATE 25 MG: 25 TABLET, EXTENDED RELEASE ORAL at 21:19

## 2021-09-07 RX ADMIN — ACETAMINOPHEN 650 MG: 325 TABLET ORAL at 13:05

## 2021-09-07 RX ADMIN — HEPARIN SODIUM 5000 UNITS: 5000 INJECTION INTRAVENOUS; SUBCUTANEOUS at 17:37

## 2021-09-07 RX ADMIN — PRAVASTATIN SODIUM 40 MG: 20 TABLET ORAL at 21:18

## 2021-09-07 RX ADMIN — SODIUM CHLORIDE 100 ML/HR: 900 INJECTION, SOLUTION INTRAVENOUS at 13:03

## 2021-09-07 RX ADMIN — ACETAMINOPHEN 650 MG: 325 TABLET ORAL at 21:18

## 2021-09-07 RX ADMIN — POTASSIUM CHLORIDE 40 MEQ: 1500 TABLET, EXTENDED RELEASE ORAL at 11:52

## 2021-09-07 NOTE — PROGRESS NOTES
20:00  Assessment completed. O2  Remains @ 2 LPM per NC. Lungs are clear bilat but are decreased in the bases. Resting quietly in bed. 22:30 Shift assessment completed. See nsg flow sheet for details. 02:55 Reassessed with 0 changes noted. Resting quietly in bed with eyes closed between cares. 07:15 Bedside and Verbal shift change report given to Bandar Pack RN (oncoming nurse) by Mer Parks RN (offgoing nurse). Report included the following information SBAR.

## 2021-09-07 NOTE — PROGRESS NOTES
Problem: Falls - Risk of  Goal: *Absence of Falls  Description: Document Simona Thao Fall Risk and appropriate interventions in the flowsheet.   Outcome: Progressing Towards Goal  Note: Fall Risk Interventions:  Mobility Interventions: Communicate number of staff needed for ambulation/transfer, Patient to call before getting OOB, Utilize walker, cane, or other assistive device    Mentation Interventions: Adequate sleep, hydration, pain control    Medication Interventions: Assess postural VS orthostatic hypotension, Patient to call before getting OOB, Teach patient to arise slowly    Elimination Interventions: Call light in reach, Patient to call for help with toileting needs

## 2021-09-07 NOTE — PROGRESS NOTES
9/7/2021  PT treatment not completed due to:  [] Off Unit for testing/procedure  [x] Pain  [] Eating  [] Patient too lethargic  [x] Nausea  [] Dialysis treatment in progress   [x] Other: pt c/o nausea, spinal discomfort (h/o lumbar surgery) and R knee pain. Spouse present. Pt initially sleeping, but awakened easily during PT/spouse conversation. Will notify nurse Angelina Hidalgo of above.    Derrick Tamayo, PT

## 2021-09-07 NOTE — PROGRESS NOTES
1159-Paged MD Florencio Rosado pt has upset stomach inquired if she can take Maalox. 1200-Informed MD Florencio Rosado pt  made request for pepto bismul or maalox for pt upset stomach, also  inquired if he would see MD said he hasnt seen him yet. MD Florencio Rosado said he would look at it.

## 2021-09-07 NOTE — PROGRESS NOTES
D/C Plan: SNF    CM has reached out to [id number] [facility] as neither facility has provided a determination regarding acceptance. CM spoke with pt's  and provided an update. Pt's  is agreeable to having clinical information sent to area facilities (, [facility], and [facility]) for review. CMS has been notified to assist.   Anticiapate pt will transition to SNF once an accepting facility has been identified.   CM to continue to follow and assist.    Care Management Interventions  Mode of Transport at Discharge: [facility]  Transition of Care Consult (CM Consult): SNF, Discharge Planning  Health Maintenance Reviewed: Yes  Physical Therapy Consult: Yes  Occupational Therapy Consult: Yes  Speech Therapy Consult: Yes  Current Support Network: Lives with Spouse  The Plan for Transition of Care is Related to the Following Treatment Goals : SNF  The Patient and/or Patient Representative was Provided with a Choice of Provider and Agrees with the Discharge Plan?: Yes  Name of the Patient Representative Who was Provided with a Choice of Provider and Agrees with the Discharge Plan: pt/spouse  Freedom of Choice List was Provided with Basic Dialogue that Supports the Patient's Individualized Plan of Care/Goals, Treatment Preferences and Shares the Quality Data Associated with the Providers?: Yes  Discharge Location  Discharge Placement: Skilled nursing facility

## 2021-09-07 NOTE — CONSULTS
Palliative Medicine Consult    Patient Name: Vle Borja  YOB: 1932    Date of Initial Consult: 9/7/2021  Reason for Consult: Goals of care discussions  Requesting Provider: Dr. Karri Hodges  Primary Care Physician: Star Thorpe MD      SUMMARY:   Vel Borja is a 80 y.o. with a past history of hypertension, diabetes, hyperlipidemia, myocardial infarction, aortic valve disorder, CAD, dementia, and hypothyroidism, who was admitted on 9/2/2021 from home with a diagnosis of community-acquired pneumonia with delirium. Current medical issues leading to Palliative Medicine involvement include: Support and goals of care discussions. PALLIATIVE DIAGNOSES:   1. Goals of care discussions  2. Community-acquired pneumonia  3. Dementia  4. Debility       PLAN:   1. Goals of care discussions: Palliative medicine team including Marianna Walker RN and I met with patient and patient's spouse at bedside. Patient is oriented x2, extremely hard of hearing. Patient's spouse confirms DNR/DNI, and states that he has the paperwork but he forgot to bring it today. Explained the importance of us needing a copy of this paperwork to ensure that she has all necessary documents to protect her against resuscitation and intubation. Patient's  states that he will bring in the documents. Plan is for SNF at discharge. Will need further form completion pending review of provided documentation. 2. Community-acquired pneumonia: On IV antibiotic therapy per primary team.  Speech-language pathology with recommendations for bite-size diet with thin liquids. 3. Dementia: FAST score 7 A on assessment. Score may be acutely altered due to acute delirium. 4. Debility: Lives at home with spouse, plan for SNF at discharge. Needs assist with all ADLs at this time, but patient is able to feed self. 5. Initial consult note routed to primary continuity provider  6.  Communicated plan of care with: Palliative IDT       GOALS OF CARE / TREATMENT PREFERENCES:   [====Goals of Care====]  GOALS OF CARE: DNR/DNI  Patient/Health Care Proxy Stated Goals: Prolong life      TREATMENT PREFERENCES:   Code Status: DNR    Advance Care Planning:  Advance Care Planning 9/3/2021   Patient's Healthcare Decision Maker is: Named in scanned ACP document   Confirm Advance Directive Yes, not on file       Medical Interventions: Limited additional interventions           The palliative care team has discussed with patient / health care proxy about goals of care / treatment preferences for patient.  [====Goals of Care====]         HISTORY:     History obtained from: Patient's chart, family    CHIEF COMPLAINT: Hard of hearing    HPI/SUBJECTIVE:    The patient is:   [] Verbal and participatory  [x] Non-participatory due to:   Oriented x2, extremely hard of hearing, goals of care discussion with spouse at bedside    Clinical Pain Assessment (nonverbal scale for severity on nonverbal patients):   Clinical Pain Assessment  Severity: 0            FUNCTIONAL ASSESSMENT:     Palliative Performance Scale (PPS):  PPS: 50       PSYCHOSOCIAL/SPIRITUAL SCREENING:     Advance Care Planning:  Advance Care Planning 9/3/2021   Patient's Healthcare Decision Maker is: Named in scanned ACP document   Confirm Advance Directive Yes, not on file        Any spiritual / Confucianist concerns: Unable to assess  [] Yes /  [] No    Caregiver Burnout:  [] Yes /  [x] No /  [] No Caregiver Present      Anticipatory grief assessment: Unable to assess  [] Normal  / [] Maladaptive            REVIEW OF SYSTEMS:     Positive and pertinent negative findings in ROS are noted above in HPI. The following systems were [x] reviewed / [] unable to be reviewed as noted in HPI  Other findings are noted below. Systems: constitutional, ears/nose/mouth/throat, respiratory, gastrointestinal, genitourinary, musculoskeletal, integumentary, neurologic, psychiatric, endocrine. Positive findings noted below.   Modified ESAS Completed by: provider   Fatigue: 4       Pain: 0   Anxiety: 0 Nausea: 0     Dyspnea: 0     Constipation: No     Stool Occurrence(s): 1        PHYSICAL EXAM:     From RN flowsheet:  Wt Readings from Last 3 Encounters:   09/05/21 48.7 kg (107 lb 4.8 oz)   04/23/21 48.1 kg (106 lb)   06/05/19 49.4 kg (109 lb)     Blood pressure (!) 142/52, pulse 70, temperature 98.8 °F (37.1 °C), resp. rate 20, height 4' 7.91\" (1.42 m), weight 48.7 kg (107 lb 4.8 oz), SpO2 100 %.     Pain Scale 1: Visual  Pain Intensity 1: 0     Pain Location 1: Knee  Pain Orientation 1: Left, Right  Pain Description 1: Aching  Pain Intervention(s) 1: Ice      Constitutional: Awake, alert, in NAD, [place]  Eyes: pupils equal, anicteric  ENMT: no nasal discharge, moist mucous membranes  Cardiovascular: regular rhythm, distal pulses intact  Respiratory: breathing not labored, symmetric  Gastrointestinal: soft non-tender, +bowel sounds  Musculoskeletal: no deformity, no tenderness to palpation  Skin: warm, dry  Neurologic: following commands, moving all extremities, oriented x 2  Psychiatric: full affect, no hallucinations         HISTORY:     Principal Problem:    CAP (community acquired pneumonia) (9/2/2021)    Active Problems:    COVID-19 vaccine series completed (9/2/2021)      Suspected COVID-19 virus infection (9/2/2021)      Dementia ([address] (9/2/2021)      Delirium (9/2/2021)      Acquired hypothyroidism (9/2/2021)      Past Medical History:   Diagnosis Date    Adverse effect of anesthesia     very, very sleepy with Anesthesia    Adverse effect of anesthesia     decreased heartrate with colonoscopy    Anemia     Arthritis     osteoarthritis    CAD (coronary artery disease)     Depression     Diabetes ([address]     adult onset    Difficult intubation     went bradycardc with symptoms, had diificuly waking up for 24 hours    Foot fracture 2008    right    GERD (gastroesophageal reflux disease)     Heart attack ([address] 2005    Hypercholesterolemia     Hyperlipidemia     Hypertension     Ill-defined condition 10/2018    reddened itchy bumps on lower abdomen after steroid injections in October, itchy patient states, very red, slightly opened with yellow looking head on them.  Ill-defined condition     history of frequent uti's, especially with stress and pressure on the stomach    Ill-defined condition     history of post nasal drip    Ill-defined condition     history of frequent small BM's at night    Ill-defined condition     recurren bladder infection    Menopause     Mitral regurgitation     Myocardial infarct (HCC)     Nausea & vomiting     severe nausea    Sick sinus syndrome (HCC)     Thyroid disease       Past Surgical History:   Procedure Laterality Date    HX CATARACT REMOVAL Right     HX GI      colonosopy    HX GYN      bartholin cyst    HX HEART CATHETERIZATION  2005    HX HEENT Bilateral     macular puckering    HX HEENT      sinus surgery    HX KNEE ARTHROSCOPY Right     HX ORTHOPAEDIC      cortisone shots to both knees    HX OTHER SURGICAL      shoulder surgery    HX OTHER SURGICAL      eye surgery    HX OTHER SURGICAL  many years ago    sinus surgery    HX OTHER SURGICAL      knee surgery    HX OTHER SURGICAL  10-22 & 10-29    Epidural steroid injection    HX SHOULDER ARTHROSCOPY Right            Family History   Problem Relation Age of Onset    Coronary Artery Disease Mother     Anemia Neg Hx     Arrhythmia Neg Hx     Asthma Neg Hx     Clotting Disorder Neg Hx     Diabetes Neg Hx     Fainting Neg Hx     Heart Attack Neg Hx     Heart Surgery Neg Hx     High Cholesterol Neg Hx     Hypertension Neg Hx     Pacemaker Neg Hx     Sudden Death Neg Hx       History reviewed, no pertinent family history.   Social History     Tobacco Use    Smoking status: Never Smoker    Smokeless tobacco: Never Used   Substance Use Topics    Alcohol use: No     Allergies   Allergen Reactions    Latex, Natural Rubber Not Reported This Time    Aspirin Nausea Only    Iodinated Contrast Media Other (comments)     Reactions: splotches on skin    As per pt and spouse , pt is only allergic to red dye.  Pt and spouse were questioned many times and denied allergies  Say that pt did okay with contrast dye last cath done by Dr Charles Hawley Other Medication Itching     All \"mycins\"    Pcn [Penicillins] Other (comments)     Reactions: splotches on skin    Sting, Bee Swelling      Current Facility-Administered Medications   Medication Dose Route Frequency    camphor-methyl salicyl-menthoL (BENGAY) topical cream (Patient Supplied)   Topical TID    aluminum-magnesium hydroxide (MAALOX) oral suspension 15 mL  15 mL Oral QID PRN    lidocaine 4 % patch 2 Patch  2 Patch TransDERmal Q24H    melatonin (rapid dissolve) tablet 10 mg  10 mg Oral QHS    acetaminophen (TYLENOL) tablet 650 mg  650 mg Oral Q6H PRN    hydrALAZINE (APRESOLINE) 20 mg/mL injection 20 mg  20 mg IntraVENous Q6H PRN    ondansetron (ZOFRAN) injection 4 mg  4 mg IntraVENous Q6H PRN    cefTRIAXone (ROCEPHIN) 2 g in sterile water (preservative free) 20 mL IV syringe  2 g IntraVENous Q24H    azithromycin (ZITHROMAX) 500 mg in 0.9% sodium chloride 250 mL (VIAL-MATE)  500 mg IntraVENous Q24H    gentamicin-prednisoLONE (PRED G) 0.3-1 % ophthalmic suspension 1 Drop (Patient Supplied)  1 Drop Both Eyes QID    levothyroxine (SYNTHROID) tablet 25 mcg  25 mcg Oral QHS    metoprolol succinate (TOPROL-XL) XL tablet 25 mg  25 mg Oral QHS    montelukast (SINGULAIR) tablet 10 mg  10 mg Oral QPM    multivitamin, tx-iron-ca-min (THERA-M w/ IRON) tablet 1 Tablet  1 Tablet Oral DAILY    omega-3 acid ethyl esters (LOVAZA) capsule 1 Capsule  1 Capsule Oral DAILY    pravastatin (PRAVACHOL) tablet 40 mg  40 mg Oral QHS    glucose chewable tablet 16 g  16 g Oral PRN    glucagon (GLUCAGEN) injection 1 mg  1 mg IntraMUSCular PRN    dextrose (D50W) injection syrg 25 g  50 mL IntraVENous PRN    insulin lispro (HUMALOG) injection   SubCUTAneous AC&HS    0.9% sodium chloride infusion  100 mL/hr IntraVENous CONTINUOUS    heparin (porcine) injection 5,000 Units  5,000 Units SubCUTAneous Q8H    telmisartan (MICARDIS) tablet 40 mg  40 mg Oral DAILY    And    hydroCHLOROthiazide (HYDRODIURIL) tablet 12.5 mg  12.5 mg Oral DAILY          LAB AND IMAGING FINDINGS:     Lab Results   Component Value Date/Time    WBC 12.7 09/05/2021 02:19 AM    HGB 11.2 (L) 09/05/2021 02:19 AM    PLATELET 148 60/46/4355 02:19 AM     Lab Results   Component Value Date/Time    Sodium 140 09/05/2021 02:19 AM    Potassium 3.4 (L) 09/05/2021 02:19 AM    Chloride 110 09/05/2021 02:19 AM    CO2 20 (L) 09/05/2021 02:19 AM    BUN 27 (H) 09/05/2021 02:19 AM    Creatinine 0.99 09/05/2021 02:19 AM    Calcium 7.8 (L) 09/05/2021 02:19 AM    Magnesium 2.0 09/02/2021 09:45 AM      Lab Results   Component Value Date/Time    Alk. phosphatase 88 09/02/2021 09:45 AM    Protein, total 7.3 09/02/2021 09:45 AM    Albumin 3.5 09/02/2021 09:45 AM    Globulin 3.8 09/02/2021 09:45 AM     Lab Results   Component Value Date/Time    INR 1.0 09/02/2021 09:45 AM    Prothrombin time 12.7 09/02/2021 09:45 AM    aPTT 27.8 09/02/2021 09:45 AM      No results found for: IRON, FE, TIBC, IBCT, PSAT, FERR   No results found for: PH, PCO2, PO2  No components found for: [place]   Lab Results   Component Value Date/Time    CK 58 09/02/2021 09:45 AM    CK - MB 2.6 09/02/2021 09:45 AM                Total time: 30 minutes  Counseling / coordination time, spent as noted above: 25 minutes  > 50% counseling / coordination?: yes, patient and family    Prolonged service was provided for  []30 min   []75 min in face to face time in the presence of the patient, spent as noted above. Time Start:   Time End:   Note: this can only be billed with 95313 (initial) or 21187 (follow up). If multiple start / stop times, list each separately.

## 2021-09-07 NOTE — ROUTINE PROCESS
Verbal shift change report given to [facility] RN by Teri Leone RN. Report included the following information SBAR, Kardex, Summary, Intake/Output and MAR.

## 2021-09-07 NOTE — PROGRESS NOTES
Palliative Medicine    CODE STATUS: DNR/DNI    AMD Status: no AMD on file. Her , Dale Coburn, is her elgal next of kin     9/7/2021 9412 Seen today in room 304 along with Donnie Camarena NP. Awake, alert. Remembered me from previous visit. In no distress. Wearing oxygen at 2 lpm per NC.  at bedside. Denies pain currently Continues with decreased vision and hearing abilities. IVF infusing. Plans remain in place to look for SNF for rehab. Mr Rain Ortiz was encouraged to bring her AMD documents in for scanning and review as those will be important before transfer to protect his wife's wishes after discharge. Disposition plan: anticipate SNF    Palliative care will continue to follow Luis Solis  and her family during her hospitalization and support them as they make healthcare decisions and define goals of care.       Irma Lucia, RN, MSN  Palliative Medicine  P: [phone]

## 2021-09-07 NOTE — PROGRESS NOTES
Hospitalist Progress Note    Patient: Neeta Sharma MRN: [id number]  CSN: [id number]    YOB: 1932  Age: 80 y.o. Sex: female    DOA: 9/2/2021 LOS:  LOS: 5 days                Assessment/Plan     Patient Active Problem List   Diagnosis Code    Hypertension I10    Diabetes ([address] E11.9    Fatigue R53.83    Hyperlipidemia E78.5    Dizziness R44    H/O myocardial infarction, greater than 8 weeks I25.2    Back pain M54.9    Aortic valve disorders I35.9    Lumbar spinal stenosis M48.061    Lumbar spondylosis M47.816    Radiculopathy of leg M54.10    CAP (community acquired pneumonia) J18.9    COVID-19 vaccine series completed Z92.29    Suspected COVID-19 virus infection Z20.822    CAD (coronary artery disease) I25.10    Dementia ([facility] F03.90    Delirium R41.0    Acquired hypothyroidism E03.9      Chief complaint :   Dizziness, vomiting    80 y.o. female with diabetes, hypertension, CAD, hypothyroidism, hyperlipidemia, GERD presents to ER with concerns of dizziness, vomiting. Some dizziness but overall feeling better  MRI brain negative for any acute findings. Community-acquired pneumonia-  Continue with ceftriaxone and azithromycin     Concern for aspiration versus COVID-19  CT scan showed scattered areas of pulmonary interstitial and groundglass opacities within both lower lobes, may represent infectious process, atelectasis or sequela of edema. Rapid Covid 19 and PCR negative     CAD-  Continue with beta-blocker and statin     Hypertension-  Continue with home medication, continue on telmisartan and hydrochlorothiazide, monitor blood pressure.     DM-  Hold oral hypoglycemics, start on sliding scale insulin and diabetic diet hypothyroidism-  Continue Synthroid  TSH in normal range. Dementia with delirium -  Pt is at high risk for delirium.  Attempt to maintain circadian rhythm, avoid excessive or unnecessary lab draws/needle sticks, unnecessary lines (telemetry leads, IVs, weiss catheters). Minimize psychotropics and physical restraints. Try to have patient family at bedside at night for redirection. Move room closer to nursing station if needed.     DVT prophylaxis with heparin     PT/OT     Discussed with  at bedside       Palliative care following    To Rehab    Disposition : 1-2 days    Review of systems  General: No fevers or chills. Cardiovascular: No chest pain or pressure. No palpitations. Pulmonary: No shortness of breath. Gastrointestinal: No nausea, vomiting. Physical Exam:  General: Awake, cooperative, no acute distress    HEENT: NC, Atraumatic. PERRLA, anicteric sclerae. Lungs: Rales lower lungs bilaterally. Heart:  S1 S2,  No murmur, No Rubs, No Gallops  Abdomen: Soft, Non distended, Non tender.  +Bowel sounds,   Extremities: No c/c/e  Psych:   Not anxious or agitated. Neurologic:  No acute neurological deficit. Vital signs/Intake and Output:  Visit Vitals  /61   Pulse 69   Temp 97.9 °F (36.6 °C)   Resp 18   Ht 4' 7.91\" (1.42 m)   Wt 48.7 kg (107 lb 4.8 oz)   SpO2 100%   BMI 24.14 kg/m²     Current Shift:  No intake/output data recorded. Last three shifts:  09/05 1901 - 09/07 0700  In: 400 [I.V.:400]  Out: 200 [Urine:200]            Labs: Results:       Chemistry Recent Labs     09/05/21 0219   *      K 3.4*      CO2 20*   BUN 27*   CREA 0.99   CA 7.8*   AGAP 10   BUCR 27*      CBC w/Diff Recent Labs     09/05/21 0219   WBC 12.7   RBC 4.27   HGB 11.2*   HCT 33.8*         Cardiac Enzymes No results for input(s): CPK, CKND1, BRAYAN in the last 72 hours. No lab exists for component: CKRMB, TROIP   Coagulation No results for input(s): PTP, INR, APTT, INREXT, INREXT in the last 72 hours.     Lipid Panel No results found for: CHOL, CHOLPOCT, CHOLX, CHLST, CHOLV, 633860, HDL, HDLP, LDL, LDLC, DLDLP, 039477, VLDLC, VLDL, TGLX, TRIGL, TRIGP, TGLPOCT, CHHD, CHHDX   BNP No results for input(s): BNPP in the last 72 hours.   Liver Enzymes No results for input(s): TP, ALB, TBIL, AP in the last 72 hours.     No lab exists for component: SGOT, GPT, DBIL   Thyroid Studies Lab Results   Component Value Date/Time    TSH 3.31 09/02/2021 09:45 AM        Procedures/imaging: see electronic medical records for all procedures/Xrays and details which were not copied into this note but were reviewed prior to creation of Plan

## 2021-09-08 LAB
BACTERIA SPEC CULT: NORMAL
BACTERIA SPEC CULT: NORMAL
GLUCOSE BLD STRIP.AUTO-MCNC: 113 MG/DL (ref 70–110)
GLUCOSE BLD STRIP.AUTO-MCNC: 86 MG/DL (ref 70–110)
GLUCOSE BLD STRIP.AUTO-MCNC: 91 MG/DL (ref 70–110)
SERVICE CMNT-IMP: NORMAL
SERVICE CMNT-IMP: NORMAL

## 2021-09-08 PROCEDURE — 99232 SBSQ HOSP IP/OBS MODERATE 35: CPT | Performed by: NURSE PRACTITIONER

## 2021-09-08 PROCEDURE — 74011000250 HC RX REV CODE- 250: Performed by: PHYSICIAN ASSISTANT

## 2021-09-08 PROCEDURE — 74011250636 HC RX REV CODE- 250/636: Performed by: PHYSICIAN ASSISTANT

## 2021-09-08 PROCEDURE — 74011250637 HC RX REV CODE- 250/637: Performed by: HOSPITALIST

## 2021-09-08 PROCEDURE — 82962 GLUCOSE BLOOD TEST: CPT

## 2021-09-08 PROCEDURE — 74011250636 HC RX REV CODE- 250/636: Performed by: FAMILY MEDICINE

## 2021-09-08 PROCEDURE — 74011250637 HC RX REV CODE- 250/637: Performed by: INTERNAL MEDICINE

## 2021-09-08 PROCEDURE — 74011250637 HC RX REV CODE- 250/637: Performed by: FAMILY MEDICINE

## 2021-09-08 PROCEDURE — 65270000029 HC RM PRIVATE

## 2021-09-08 PROCEDURE — 74011250636 HC RX REV CODE- 250/636: Performed by: HOSPITALIST

## 2021-09-08 PROCEDURE — 97530 THERAPEUTIC ACTIVITIES: CPT

## 2021-09-08 PROCEDURE — 74011000250 HC RX REV CODE- 250: Performed by: FAMILY MEDICINE

## 2021-09-08 RX ORDER — TELMISARTAN 40 MG/1
40 TABLET ORAL DAILY
Status: DISCONTINUED | OUTPATIENT
Start: 2021-09-09 | End: 2021-09-09 | Stop reason: HOSPADM

## 2021-09-08 RX ORDER — POTASSIUM CHLORIDE 20 MEQ/1
40 TABLET, EXTENDED RELEASE ORAL
Status: COMPLETED | OUTPATIENT
Start: 2021-09-08 | End: 2021-09-08

## 2021-09-08 RX ORDER — HYDROCHLOROTHIAZIDE 25 MG/1
25 TABLET ORAL DAILY
Status: DISCONTINUED | OUTPATIENT
Start: 2021-09-09 | End: 2021-09-09 | Stop reason: HOSPADM

## 2021-09-08 RX ADMIN — HEPARIN SODIUM 5000 UNITS: 5000 INJECTION INTRAVENOUS; SUBCUTANEOUS at 00:21

## 2021-09-08 RX ADMIN — ALUMINUM HYDROXIDE AND MAGNESIUM HYDROXIDE 15 ML: 200; 200 SUSPENSION ORAL at 11:45

## 2021-09-08 RX ADMIN — AZITHROMYCIN MONOHYDRATE 500 MG: 500 INJECTION, POWDER, LYOPHILIZED, FOR SOLUTION INTRAVENOUS at 12:00

## 2021-09-08 RX ADMIN — METOPROLOL SUCCINATE 25 MG: 25 TABLET, EXTENDED RELEASE ORAL at 22:22

## 2021-09-08 RX ADMIN — MONTELUKAST 10 MG: 10 TABLET, FILM COATED ORAL at 19:19

## 2021-09-08 RX ADMIN — HEPARIN SODIUM 5000 UNITS: 5000 INJECTION INTRAVENOUS; SUBCUTANEOUS at 19:18

## 2021-09-08 RX ADMIN — HYDRALAZINE HYDROCHLORIDE 20 MG: 20 INJECTION INTRAMUSCULAR; INTRAVENOUS at 16:00

## 2021-09-08 RX ADMIN — ACETAMINOPHEN 650 MG: 325 TABLET ORAL at 11:45

## 2021-09-08 RX ADMIN — POTASSIUM CHLORIDE 40 MEQ: 1500 TABLET, EXTENDED RELEASE ORAL at 11:45

## 2021-09-08 RX ADMIN — OMEGA-3-ACID ETHYL ESTERS 1 CAPSULE: 1 CAPSULE, LIQUID FILLED ORAL at 09:45

## 2021-09-08 RX ADMIN — SODIUM CHLORIDE 100 ML/HR: 900 INJECTION, SOLUTION INTRAVENOUS at 22:26

## 2021-09-08 RX ADMIN — HYDROCHLOROTHIAZIDE 12.5 MG: 25 TABLET ORAL at 09:46

## 2021-09-08 RX ADMIN — LEVOTHYROXINE SODIUM 25 MCG: 0.03 TABLET ORAL at 22:22

## 2021-09-08 RX ADMIN — HEPARIN SODIUM 5000 UNITS: 5000 INJECTION INTRAVENOUS; SUBCUTANEOUS at 09:46

## 2021-09-08 RX ADMIN — CEFTRIAXONE SODIUM 2 G: 2 INJECTION, POWDER, FOR SOLUTION INTRAMUSCULAR; INTRAVENOUS at 12:00

## 2021-09-08 RX ADMIN — ONDANSETRON 4 MG: 2 INJECTION INTRAMUSCULAR; INTRAVENOUS at 04:20

## 2021-09-08 RX ADMIN — SODIUM CHLORIDE 100 ML/HR: 900 INJECTION, SOLUTION INTRAVENOUS at 08:49

## 2021-09-08 RX ADMIN — TELMISARTAN 40 MG: 40 TABLET ORAL at 09:46

## 2021-09-08 RX ADMIN — PRAVASTATIN SODIUM 40 MG: 20 TABLET ORAL at 22:22

## 2021-09-08 RX ADMIN — MULTIPLE VITAMINS W/ MINERALS TAB 1 TABLET: TAB at 09:46

## 2021-09-08 NOTE — PROGRESS NOTES
Occupational Therapy Treatment Attempt     Chart reviewed. Attempted Occupational Therapy Treatment, however, patient unable to be seen due to:  []  Nausea/vomiting  []  Eating  []  Pain  []  Patient too lethargic  []  Off Unit for testing/procedure  []  Dialysis treatment in progress  []  Telemetry Results  [x]  Other:  Pt c/o upset stomach and pain; requests to return later. Will f/u later as patient's schedule allows.   Vesna Isabel, OTR/L

## 2021-09-08 NOTE — PROGRESS NOTES
Hospitalist Progress Note    Patient: Jose Bolanos MRN: [id number]  CSN: [id number]    YOB: 1932  Age: 80 y.o. Sex: female    DOA: 9/2/2021 LOS:  LOS: 6 days                Assessment/Plan     Patient Active Problem List   Diagnosis Code    Hypertension I10    Diabetes ([address] E11.9    Fatigue R53.83    Hyperlipidemia E78.5    Dizziness R44    H/O myocardial infarction, greater than 8 weeks I25.2    Back pain M54.9    Aortic valve disorders I35.9    Lumbar spinal stenosis M48.061    Lumbar spondylosis M47.816    Radiculopathy of leg M54.10    CAP (community acquired pneumonia) J18.9    COVID-19 vaccine series completed Z92.29    Suspected COVID-19 virus infection Z20.822    CAD (coronary artery disease) I25.10    Dementia ([facility] F03.90    Delirium R41.0    Acquired hypothyroidism E03.9    Encounter for palliative care Z51.5      Chief complaint :   Dizziness, vomiting    80 y.o. female with diabetes, hypertension, CAD, hypothyroidism, hyperlipidemia, GERD presents to ER with concerns of dizziness, vomiting. Some dizziness but overall feeling better  MRI brain negative for any acute findings. Community-acquired pneumonia-  Continue with ceftriaxone and azithromycin     Concern for aspiration versus COVID-19  CT scan showed scattered areas of pulmonary interstitial and groundglass opacities within both lower lobes, may represent infectious process, atelectasis or sequela of edema. Rapid Covid 19 and PCR negative     CAD-  Continue with beta-blocker and statin     Hypertension-  Continue with home medication, continue on telmisartan and hydrochlorothiazide, monitor blood pressure.     DM-  Hold oral hypoglycemics, start on sliding scale insulin and diabetic diet    hypothyroidism-  Continue Synthroid  TSH in normal range. Dementia with delirium -  Pt is at high risk for delirium.  Attempt to maintain circadian rhythm, avoid excessive or unnecessary lab draws/needle sticks, unnecessary lines (telemetry leads, IVs, weiss catheters). Minimize psychotropics and physical restraints. Try to have patient family at bedside at night for redirection. Move room closer to nursing station if needed.     DVT prophylaxis with heparin     PT/OT     Discussed with  at bedside    To Rehab    Disposition : 1-2 days    Review of systems  General: No fevers or chills. Cardiovascular: No chest pain or pressure. No palpitations. Pulmonary: No shortness of breath. Gastrointestinal: No nausea, vomiting. Physical Exam:  General: Awake, cooperative, no acute distress    HEENT: NC, Atraumatic. PERRLA, anicteric sclerae. Lungs: Rales lower lungs bilaterally. Heart:  S1 S2,  No murmur, No Rubs, No Gallops  Abdomen: Soft, Non distended, Non tender.  +Bowel sounds,   Extremities: No c/c/e  Psych:   Not anxious or agitated. Neurologic:  No acute neurological deficit. Vital signs/Intake and Output:  Visit Vitals  BP (!) 167/65   Pulse 73   Temp 98.3 °F (36.8 °C)   Resp 18   Ht 4' 7.91\" (1.42 m)   Wt 48.7 kg (107 lb 4.8 oz)   SpO2 100%   BMI 24.14 kg/m²     Current Shift:  No intake/output data recorded. Last three shifts:  09/06 1901 - 09/08 0700  In: 720 [P.O.:320; I.V.:400]  Out: -             Labs: Results:       Chemistry No results for input(s): GLU, NA, K, CL, CO2, BUN, CREA, CA, AGAP, BUCR, TBIL, AP, TP, ALB, GLOB, AGRAT in the last 72 hours. No lab exists for component: GPT   CBC w/Diff No results for input(s): WBC, RBC, HGB, HCT, PLT, GRANS, LYMPH, EOS, HGBEXT, HCTEXT, PLTEXT, HGBEXT, HCTEXT, PLTEXT in the last 72 hours. Cardiac Enzymes No results for input(s): CPK, CKND1, BRAYAN in the last 72 hours. No lab exists for component: CKRMB, TROIP   Coagulation No results for input(s): PTP, INR, APTT, INREXT, INREXT in the last 72 hours.     Lipid Panel No results found for: CHOL, CHOLPOCT, CHOLX, CHLST, CHOLV, 711129, HDL, HDLP, LDL, LDLC, DLDLP, 077982, VLDLC, VLDL, TGLX, TRIGL, TRIGP, TGLPOCT, CHHD, CHHDX   BNP No results for input(s): BNPP in the last 72 hours. Liver Enzymes No results for input(s): TP, ALB, TBIL, AP in the last 72 hours.     No lab exists for component: SGOT, GPT, DBIL   Thyroid Studies Lab Results   Component Value Date/Time    TSH 3.31 09/02/2021 09:45 AM        Procedures/imaging: see electronic medical records for all procedures/Xrays and details which were not copied into this note but were reviewed prior to creation of Plan

## 2021-09-08 NOTE — PROGRESS NOTES
Problem: Mobility Impaired (Adult and Pediatric)  Goal: *Acute Goals and Plan of Care (Insert Text)  Description: Physical Therapy Goals   Initiated 9/3/2021 and to be accomplished within 3-4 day(s)  1. Patient will move from supine <> sit with S in prep for out of bed activity and change of position. 2.  Patient will perform sit<> stand with S with LRAD in prep for transfers/ambulation. 3.  Patient will transfer from bed <> chair with S with LRAD for time up in chair for completion of ADL activity. 4.  Patient will ambulate 100 feet with S/LRAD for improved functional mobility at discharge. 5.  Patient will ascend/descend 3-5 stairs with handrail(s) with minimal assistance/contact guard assist for home re-entry as needed. Outcome: Progressing Towards Goal   PHYSICAL THERAPY TREATMENT    Patient: Luis Hein (80 y.o. female)  Date: 9/8/2021  Diagnosis: CAP (community acquired pneumonia) [J18.9]  Suspected COVID-19 virus infection [Z20.822]  COVID-19 vaccine series completed [Z92.29] CAP (community acquired pneumonia)    Precautions: Fall  PLOF: ambulatory with a cane, has a RW available, needs SNF    ASSESSMENT:  Pt reports pain down L RLE, (B) Knees and (B) feet. Mod/maxA to sit up EOB. TotalA to scoot pt to EOB. Bed elevated to assist with sit to stand. 2 trials of standing and both attempts pt cannot stand erect or clear buttocks from mattress. Unable to take steps at this time. C/o nausea, maxA back to supine. Pt requesting bed pan for BM, removed brief and assist pt onto bed pan. Spouse to call on call bell when pt finished. Progression toward goals:   []      Improving appropriately and progressing toward goals  [x]      Improving slowly and progressing toward goals  []      Not making progress toward goals and plan of care will be adjusted     PLAN:  Patient continues to benefit from skilled intervention to address the above impairments.   Continue treatment per established plan of care.  Discharge Recommendations:  Mack Day  Further Equipment Recommendations for Discharge:  N/A     SUBJECTIVE:   Patient stated My stomach.     OBJECTIVE DATA SUMMARY:   Critical Behavior:  Neurologic State: Alert, Appropriate for age  Orientation Level: Oriented X4  Cognition: Follows commands  Safety/Judgement: Fall prevention, Decreased awareness of environment  Functional Mobility Training:  Bed Mobility:  Supine to Sit: Moderate assistance;Maximum assistance  Sit to Supine: Moderate assistance  Scooting: Total assistance  Transfers:  Sit to Stand: Maximum assistance  Stand to Sit: Minimum assistance  Balance:  Sitting: Intact  Standing: Impaired  Standing - Static: Poor  Standing - Dynamic : Not tested         Pain:  Pain level pre-treatment: 5/10  Pain level post-treatment: 5/10   Pain Intervention(s): Medication (see MAR); Rest, Ice, Repositioning   Response to intervention: Nurse notified, See doc flow    Activity Tolerance:   poor  Please refer to the flowsheet for vital signs taken during this treatment. After treatment:   [] Patient left in no apparent distress sitting up in chair  [x] Patient left in no apparent distress in bed  [x] Call bell left within reach  [x] Nursing notified  [x] Caregiver present  [] Bed alarm activated  [] SCDs applied      COMMUNICATION/EDUCATION:   [x]         Role of Physical Therapy in the acute care setting. [x]         Fall prevention education was provided and the patient/caregiver indicated understanding. [x]         Patient/family have participated as able in working toward goals and plan of care. [x]         Patient/family agree to work toward stated goals and plan of care. []         Patient understands intent and goals of therapy, but is neutral about his/her participation.   []         Patient is unable to participate in stated goals/plan of care: ongoing with therapy staff.  []         Other:        AZALIA Villegas Calculation: 29 mins

## 2021-09-08 NOTE — ACP (ADVANCE CARE PLANNING)
Palliative Medicine    CODE STATUS: DNR/DNI; limited interventions; no feeding tube    AMD Status: AMD on file naming her  and sister as her MPOAs     9/8/2021 1005 Seen today in room 304 along with Nayan Francis NP.  at bedside. Awake, alert. Oriented --engaged in reasonable conversation but hearing loss and language issues make it difficult to assess orientation status. Frequently conversing with . Respirations unlabored on room air. Ate some of her breakfast. Concerned that her right foot, second toe, is reddened and painful. Difficult to wear socks 2/2 pain. Disposition plan: plans are being made for [facility]    After meeting with patient and , the goals of care have been defined. Code status remains: DNR/DNI; limited interventions; no feeding tube  AMD status: AMD on file naming her  and sister as her MPOAs. Will sign off but remain available for reconsult as needed/if appropriate. Thank you for the Palliative Medicine consult and allowing us to participate in the care of [facility] (Physician Orders for Scope of Treatment)  Participants:   [x] Patient    [x] Healthcare agent (already designated in existing ACP document)    Name: Lynn Bring  Relationship to Patient: spouse    Phone number: [phone]  Other persons present:   Name: Nayan Francis NP           Conversation Topics   Understanding of Medical Condition/s AND Potential Complications: We decided a long time ago that we did not want resuscitation.     Needs to discuss with spiritual/Restorationist advisor: [] Yes  [x] No    Needs more information about illness and complications:  [] Yes  [x] No   Cardiopulmonary Resuscitation    Order Elected for CPR:  []  Attempt Resuscitation [x]  Do Not Attempt Resuscitation  When NOT in Cardiopulmonary Arrest, Order Elected:    [] Comfort Measures  [x] Limited Additional Interventions  [] Full Interventions  Artificially Administered Nutrition, Order Elected:  [x] No Feeding Tube   [] Feeding Tube for a defined trial period  [] Feeding Tube long-term if indicated  The following was provided (check all that apply):      Healthcare Decision Information Cards:   [] Help with Breathing Facts   [] Tube Feeding Facts   [] CPR Facts      [x] Review of existing Advance Directive  [x] Assistance with Completion of New Advance Directive   [x] Review of [place] Form       Meeting Outcomes:   [x] ACP discussion completed   [x] [place] form completed  [x] [place] prepared for Provider review and signature   [x] Original placed on Chart, if in facility (form to be sent with patient at discharge)  [x] Copy given to healthcare agent    [x] Copy scanned to electronic medical record    Venita Strong RN, MSN  Palliative Medicine  [phone]

## 2021-09-08 NOTE — ROUTINE PROCESS
Bedside and Verbal shift change report given to Liz RN by Clint Calabrese RN. Report included the following information SBAR, Kardex, Intake/Output and MAR.

## 2021-09-08 NOTE — PROGRESS NOTES
5 - Assumed care at this time. 0115 - Pt A&Ox4, RA. Denies chest pain/SOB. Lung sounds clear. Pain rated 0/10. No concerns voiced. Telephone and call bell within reach. Bed in lowest position.

## 2021-09-08 NOTE — PROGRESS NOTES
1554 Received bedside shift report from off going nurse Liz, [place]. Report included the following information SBAR, Kardex, Intake/Output, MAR and Recent Results. Pt  at bedside. Pt cleaned up for incontinence of stool. 2100 Gave bedside shift report to oncoming nurse Abelardo Muñiz RN. Report included the following information: SBAR, Kardex, Intake/Output, MAR and Recent Results.

## 2021-09-08 NOTE — PROGRESS NOTES
Palliative Medicine Consult    Patient Name: Fabi Guo  YOB: 1932    Date of Follow-up Visit: 9/8/2021  Reason for Consult: Goals of care discussions  Requesting Provider: Dr. Jacqueline Alexander  Primary Care Physician: Wale Chapman MD      SUMMARY:   Fabi Guo is a 80 y.o. with a past history of hypertension, diabetes, hyperlipidemia, myocardial infarction, aortic valve disorder, CAD, dementia, and hypothyroidism, who was admitted on 9/2/2021 from home with a diagnosis of community-acquired pneumonia with delirium. Current medical issues leading to Palliative Medicine involvement include: Support and goals of care discussions. PALLIATIVE DIAGNOSES:   1. Encounter for palliative care/Goals of care discussions  2. Community-acquired pneumonia  3. Dementia  4. Debility       PLAN:   9/8/2021:  Seen at bedside along with Ms. Jenetta Hodgkins, RN.  at bedside. Patient is lying in bed, awake and alert. Oriented x2, extremely hard of hearing. Patient and spouse shared concern about discomfort and redness at patient's right second toe.  brought in AMD paperwork which names patient's spouse, Charles Maya and her sister, Estrella Pelletier as her surrogate decision makers. Re-discussed goals of care with patient and her  and they re-affirmed decision for DNR/DNI. Introduced and explained the physician order for scope of treatment (POST) form and offered to complete today to formalize wishes related to resuscitation and they were agreeable. POST form completed today with the following measures:  DNR/DNI, limited interventions, no feeding tube. Copy placed on chart for scanning. Original and copies provided to patient and spouse. Goals of care are DNR/DNI, limited interventions, no feeding tube. Please see below for previous conversations with the palliative medicine team:    1.  Goals of care discussions: Palliative medicine team including Lucio Jansen RN and I met with patient and patient's spouse at bedside. Patient is oriented x2, extremely hard of hearing. Patient's spouse confirms DNR/DNI, and states that he has the paperwork but he forgot to bring it today. Explained the importance of us needing a copy of this paperwork to ensure that she has all necessary documents to protect her against resuscitation and intubation. Patient's  states that he will bring in the documents. Plan is for SNF at discharge. Will need further form completion pending review of provided documentation. 2. Community-acquired pneumonia: On IV antibiotic therapy per primary team.  Speech-language pathology with recommendations for bite-size diet with thin liquids. 3. Dementia: FAST score 7 A on assessment. Score may be acutely altered due to acute delirium. 4. Debility: Lives at home with spouse, plan for SNF at discharge. Needs assist with all ADLs at this time, but patient is able to feed self. 5. Initial consult note routed to primary continuity provider  6.  Communicated plan of care with: Palliative IDT       GOALS OF CARE / TREATMENT PREFERENCES:   [====Goals of Care====]  GOALS OF CARE: DNR/DNI, limited interventions, no feeding tube  Patient/Health Care Proxy Stated Goals: Prolong life      TREATMENT PREFERENCES:   Code Status: DNR/DNI    Advance Care Planning:  Advance Care Planning 9/8/2021   Patient's Healthcare Decision Maker is: Named in scanned ACP document   Confirm Advance Directive Yes, on file       Medical Interventions: Limited additional interventions    Artificially Administered Nutrition: No feeding tube      The palliative care team has discussed with patient / health care proxy about goals of care / treatment preferences for patient.  [====Goals of Care====]         HISTORY:     History obtained from: Patient's chart, family    CHIEF COMPLAINT: Hard of hearing    HPI/SUBJECTIVE:    The patient is:   [] Verbal and participatory  [x] Non-participatory due to:   Oriented x2, extremely hard of hearing, goals of care discussion with spouse at bedside    9/8/2021:  Lying in bed, awake and alert. Extremely hard of hearing. Able to communicate discomfort in her right second toe and reported having some diarrhea yesterday. Reports fair appetite.  at bedside. Clinical Pain Assessment (nonverbal scale for severity on nonverbal patients):   Clinical Pain Assessment  Severity: 1            FUNCTIONAL ASSESSMENT:     Palliative Performance Scale (PPS):  PPS: 50       PSYCHOSOCIAL/SPIRITUAL SCREENING:     Advance Care Planning:  Advance Care Planning 9/8/2021   Patient's Healthcare Decision Maker is: Named in scanned ACP document   Confirm Advance Directive Yes, on file        Any spiritual / Yazdanism concerns: 9/8/2021 - Unable to assess  [] Yes /  [] No    Caregiver Burnout:  [] Yes /  [x] No /  [] No Caregiver Present      Anticipatory grief assessment: 9/8/2021 - Unable to assess  [] Normal  / [] Maladaptive            REVIEW OF SYSTEMS:     Positive and pertinent negative findings in ROS are noted above in HPI. The following systems were [x] reviewed / [] unable to be reviewed as noted in HPI  Other findings are noted below. Systems: constitutional, ears/nose/mouth/throat, respiratory, gastrointestinal, genitourinary, musculoskeletal, integumentary, neurologic, psychiatric, endocrine. Positive findings noted below. Modified ESAS Completed by: provider   Fatigue: 4       Pain: 1   Anxiety: 0 Nausea: 0     Dyspnea: 0     Constipation: No     Stool Occurrence(s): 1        PHYSICAL EXAM:     From RN flowsheet:  Wt Readings from Last 3 Encounters:   09/05/21 48.7 kg (107 lb 4.8 oz)   04/23/21 48.1 kg (106 lb)   06/05/19 49.4 kg (109 lb)     Blood pressure (!) 173/66, pulse 77, temperature 98.2 °F (36.8 °C), resp. rate 17, height 4' 7.91\" (1.42 m), weight 48.7 kg (107 lb 4.8 oz), SpO2 100 %.     Pain Scale 1: Numeric (0 - 10)  Pain Intensity 1: 0     Pain Location 1: Knee  Pain Orientation 1: Left, Right  Pain Description 1: Aching  Pain Intervention(s) 1: Ice      Constitutional: Lying in bed, awake, alert, extremely hard of hearing, oriented x2  Eyes: pupils equal, anicteric  ENMT: no nasal discharge, moist mucous membranes  Cardiovascular: regular rhythm, distal pulses intact  Respiratory: breathing not labored, symmetric  Gastrointestinal: soft non-tender  Musculoskeletal: no deformity, no tenderness to palpation, slight redness right second toe  Skin: warm, dry  Neurologic: following commands, moving all extremities, oriented x 2  Psychiatric: full affect, no hallucinations         HISTORY:     Principal Problem:    CAP (community acquired pneumonia) (9/2/2021)    Active Problems:    COVID-19 vaccine series completed (9/2/2021)      Suspected COVID-19 virus infection (9/2/2021)      Dementia ([address] (9/2/2021)      Delirium (9/2/2021)      Acquired hypothyroidism (9/2/2021)      Past Medical History:   Diagnosis Date    Adverse effect of anesthesia     very, very sleepy with Anesthesia    Adverse effect of anesthesia     decreased heartrate with colonoscopy    Anemia     Arthritis     osteoarthritis    CAD (coronary artery disease)     Depression     Diabetes ([address]     adult onset    Difficult intubation     went bradycardc with symptoms, had diificuly waking up for 24 hours    Foot fracture 2008    right    GERD (gastroesophageal reflux disease)     Heart attack ([address] 2005    Hypercholesterolemia     Hyperlipidemia     Hypertension     Ill-defined condition 10/2018    reddened itchy bumps on lower abdomen after steroid injections in October, itchy patient states, very red, slightly opened with yellow looking head on them.     Ill-defined condition     history of frequent uti's, especially with stress and pressure on the stomach    Ill-defined condition     history of post nasal drip    Ill-defined condition     history of frequent small BM's at night    Ill-defined condition     recurren bladder infection    Menopause     Mitral regurgitation     Myocardial infarct (HCC)     Nausea & vomiting     severe nausea    Sick sinus syndrome (HCC)     Thyroid disease       Past Surgical History:   Procedure Laterality Date    HX CATARACT REMOVAL Right     HX GI      colonosopy    HX GYN      bartholin cyst    HX HEART CATHETERIZATION  2005    HX HEENT Bilateral     macular puckering    HX HEENT      sinus surgery    HX KNEE ARTHROSCOPY Right     HX ORTHOPAEDIC      cortisone shots to both knees    HX OTHER SURGICAL      shoulder surgery    HX OTHER SURGICAL      eye surgery    HX OTHER SURGICAL  many years ago    sinus surgery    HX OTHER SURGICAL      knee surgery    HX OTHER SURGICAL  10-22 & 10-29    Epidural steroid injection    HX SHOULDER ARTHROSCOPY Right            Family History   Problem Relation Age of Onset    Coronary Artery Disease Mother     Anemia Neg Hx     Arrhythmia Neg Hx     Asthma Neg Hx     Clotting Disorder Neg Hx     Diabetes Neg Hx     Fainting Neg Hx     Heart Attack Neg Hx     Heart Surgery Neg Hx     High Cholesterol Neg Hx     Hypertension Neg Hx     Pacemaker Neg Hx     Sudden Death Neg Hx       History reviewed, no pertinent family history. Social History     Tobacco Use    Smoking status: Never Smoker    Smokeless tobacco: Never Used   Substance Use Topics    Alcohol use: No     Allergies   Allergen Reactions    Latex, Natural Rubber Not Reported This Time    Aspirin Nausea Only    Iodinated Contrast Media Other (comments)     Reactions: splotches on skin    As per pt and spouse , pt is only allergic to red dye.  Pt and spouse were questioned many times and denied allergies  Say that pt did okay with contrast dye last cath done by Dr Walt Nageotte Other Medication Itching     All \"mycins\"    Pcn [Penicillins] Other (comments)     Reactions: splotches on skin    Sting, Bee Swelling      Current Facility-Administered Medications   Medication Dose Route Frequency    [START ON 9/9/2021] hydroCHLOROthiazide (HYDRODIURIL) tablet 25 mg  25 mg Oral DAILY    And    [START ON 9/9/2021] telmisartan (MICARDIS) tablet 40 mg  40 mg Oral DAILY    camphor-methyl salicyl-menthoL (BENGAY) topical cream (Patient Supplied)   Topical TID    aluminum-magnesium hydroxide (MAALOX) oral suspension 15 mL  15 mL Oral QID PRN    lidocaine 4 % patch 2 Patch  2 Patch TransDERmal Q24H    melatonin (rapid dissolve) tablet 10 mg  10 mg Oral QHS    acetaminophen (TYLENOL) tablet 650 mg  650 mg Oral Q6H PRN    hydrALAZINE (APRESOLINE) 20 mg/mL injection 20 mg  20 mg IntraVENous Q6H PRN    ondansetron (ZOFRAN) injection 4 mg  4 mg IntraVENous Q6H PRN    cefTRIAXone (ROCEPHIN) 2 g in sterile water (preservative free) 20 mL IV syringe  2 g IntraVENous Q24H    azithromycin (ZITHROMAX) 500 mg in 0.9% sodium chloride 250 mL (VIAL-MATE)  500 mg IntraVENous Q24H    gentamicin-prednisoLONE (PRED G) 0.3-1 % ophthalmic suspension 1 Drop (Patient Supplied)  1 Drop Both Eyes QID    levothyroxine (SYNTHROID) tablet 25 mcg  25 mcg Oral QHS    metoprolol succinate (TOPROL-XL) XL tablet 25 mg  25 mg Oral QHS    montelukast (SINGULAIR) tablet 10 mg  10 mg Oral QPM    multivitamin, tx-iron-ca-min (THERA-M w/ IRON) tablet 1 Tablet  1 Tablet Oral DAILY    omega-3 acid ethyl esters (LOVAZA) capsule 1 Capsule  1 Capsule Oral DAILY    pravastatin (PRAVACHOL) tablet 40 mg  40 mg Oral QHS    glucose chewable tablet 16 g  16 g Oral PRN    glucagon (GLUCAGEN) injection 1 mg  1 mg IntraMUSCular PRN    dextrose (D50W) injection syrg 25 g  50 mL IntraVENous PRN    insulin lispro (HUMALOG) injection   SubCUTAneous AC&HS    0.9% sodium chloride infusion  100 mL/hr IntraVENous CONTINUOUS    heparin (porcine) injection 5,000 Units  5,000 Units SubCUTAneous Q8H          LAB AND IMAGING FINDINGS:     Lab Results   Component Value Date/Time    WBC 12.7 09/05/2021 02:19 AM HGB 11.2 (L) 09/05/2021 02:19 AM    PLATELET 623 58/87/7335 02:19 AM     Lab Results   Component Value Date/Time    Sodium 140 09/05/2021 02:19 AM    Potassium 3.4 (L) 09/05/2021 02:19 AM    Chloride 110 09/05/2021 02:19 AM    CO2 20 (L) 09/05/2021 02:19 AM    BUN 27 (H) 09/05/2021 02:19 AM    Creatinine 0.99 09/05/2021 02:19 AM    Calcium 7.8 (L) 09/05/2021 02:19 AM    Magnesium 2.0 09/02/2021 09:45 AM      Lab Results   Component Value Date/Time    Alk. phosphatase 88 09/02/2021 09:45 AM    Protein, total 7.3 09/02/2021 09:45 AM    Albumin 3.5 09/02/2021 09:45 AM    Globulin 3.8 09/02/2021 09:45 AM     Lab Results   Component Value Date/Time    INR 1.0 09/02/2021 09:45 AM    Prothrombin time 12.7 09/02/2021 09:45 AM    aPTT 27.8 09/02/2021 09:45 AM      No results found for: IRON, FE, TIBC, IBCT, PSAT, FERR   No results found for: PH, PCO2, PO2  No components found for: [place]   Lab Results   Component Value Date/Time    CK 58 09/02/2021 09:45 AM    CK - MB 2.6 09/02/2021 09:45 AM                Total time: 25 minutes  Counseling / coordination time, spent as noted above: 20 minutes  > 50% counseling / coordination?: yes, patient and spouse    Prolonged service was provided for  []30 min   []75 min in face to face time in the presence of the patient, spent as noted above.   Time Start:   Time End:

## 2021-09-08 NOTE — PROGRESS NOTES
D/C Plan: [facility] 9/9/21    CM has been notified [facility] would be able to accept this pt tomorrow pending proof of vaccination. Pt's  will bring pt's vaccination card tomorrow to assist with transition to this facility. Anticipate pt will transition to [facility] tomorrow. Transition of Care Plan: [facility]    Communication to Patient/Family:  Met with patient and family, and they are agreeable to the transition plan. The Plan for Transition of Care is related to the following treatment goals: SNF    The Patient and/or patient representative was provided with a choice of provider and agrees   with the discharge plan. Yes [x] No []    Freedom of choice list was provided with basic dialogue that supports the patient's individualized plan of care/goals and shares the quality data associated with the providers. Yes [x] No []    SNF/Rehab Transition:  Patient has been accepted to [facility] at [address] [place] [address] for SNF and meets criteria for admission. Patient will transported by medical transport and expected to leave tomorrow (9/9/21). Communication to SNF/Rehab:  Bedside RN,  has been notified to update the transition plan to the facility and call report [phone]. Discharge information has been updated on the AVS and communicated through [facility] or CC Link. Discharge instructions to be fax'd to facility at [phone]     Please include all hard scripts for controlled substances, med rec and dc summary, and AVS in packet. Please medicate for pain prior to dc if possible and needed to help offset delay when patient first arrives to facility. Reviewed and confirmed with facility, [facility] can manage the patient care needs for the following:     Michelle Verdugo with (X) only those applicable:  Medication:  []Medications are available at the facility  []IV Antibiotics    []Controlled Substance  hard copies available sent.   []Weekly Labs Equipment:  []CPAP/BiPAP  []Wound Vacuum  []Glover or Urinary Device  []PICC/Central Line  []Nebulizer  []Ventilator    Treatment:  []Isolation (for MRSA, VRE, etc.)  []Surgical Drain Management  []Tracheostomy Care  []Dressing Changes  []Dialysis with transportation  []PEG Care  []Oxygen  []Daily Weights for Heart Failure    Dietary:  []Any diet limitations  []Tube Feedings   []Total Parenteral Management (TPN)    Financial Resources:  []Medicaid Application Completed    []UAI Completed and copy given to pt/family. []A screening has previously been completed. []Level II Completed    [] Private pay individual who will not become   financially eligible for Medicaid within 6 months from admission to a [address] [place] [address] facility. [] Individual refused to have screening conducted. []Medicaid Application Completed    []The screening denied because it was determined individual did not need/did not qualify for nursing facility level of care. [] Out of state residents seeking direct admission to a [address] facility. [] Individuals who are inpatients of an out of state hospital, or in state or out of state veterans/ hospital and seek direct admission to a [address] facility  [] Individuals who are pateints or residents of a state owned/operated facility that is licensed by [employer]) and seek direct admission to [address] [place] [address]  [] A screening not required for enrollment in [address] [facility] services as set out in [address] [place] [address] 06-  [] [facility]) staff shall perform screenings of the [facility] clients. Advanced Care Plan:  []Surrogate Decision Maker of Care  []POA  []Communicated Code Status and copy sent.     Other:         Care Management Interventions  Mode of Transport at Discharge: BLS  Transition of Care Consult (CM Consult): SNF, Discharge Planning  Health Maintenance Reviewed: Yes  Physical Therapy Consult: Yes  Occupational Therapy Consult: Yes  Speech Therapy Consult: Yes  Support Systems: Spouse/Significant Other/Partner  Read Only, Retired: Current Support Network: Lives with Spouse  The Plan for Transition of Care is Related to the Following Treatment Goals : SNF  The Patient and/or Patient Representative was Provided with a Choice of Provider and Agrees with the Discharge Plan?: Yes  Name of the Patient Representative Who was Provided with a Choice of Provider and Agrees with the Discharge Plan: pt/spouse  Freedom of Choice List was Provided with Basic Dialogue that Supports the Patient's Individualized Plan of Care/Goals, Treatment Preferences and Shares the Quality Data Associated with the Providers?: Yes  Discharge Location  Discharge Placement: Skilled nursing facility

## 2021-09-09 VITALS
HEART RATE: 85 BPM | WEIGHT: 117.5 LBS | RESPIRATION RATE: 22 BRPM | TEMPERATURE: 98.4 F | HEIGHT: 56 IN | BODY MASS INDEX: 26.43 KG/M2 | OXYGEN SATURATION: 98 % | DIASTOLIC BLOOD PRESSURE: 78 MMHG | SYSTOLIC BLOOD PRESSURE: 136 MMHG

## 2021-09-09 LAB
ANION GAP SERPL CALC-SCNC: 8 MMOL/L (ref 3–18)
BUN SERPL-MCNC: 22 MG/DL (ref 7–18)
BUN/CREAT SERPL: 27 (ref 12–20)
CALCIUM SERPL-MCNC: 7.3 MG/DL (ref 8.5–10.1)
CHLORIDE SERPL-SCNC: 114 MMOL/L (ref 100–111)
CO2 SERPL-SCNC: 19 MMOL/L (ref 21–32)
CREAT SERPL-MCNC: 0.82 MG/DL (ref 0.6–1.3)
ERYTHROCYTE [DISTWIDTH] IN BLOOD BY AUTOMATED COUNT: 18.6 % (ref 11.6–14.5)
GLUCOSE BLD STRIP.AUTO-MCNC: 79 MG/DL (ref 70–110)
GLUCOSE BLD STRIP.AUTO-MCNC: 83 MG/DL (ref 70–110)
GLUCOSE BLD STRIP.AUTO-MCNC: 83 MG/DL (ref 70–110)
GLUCOSE BLD STRIP.AUTO-MCNC: 85 MG/DL (ref 70–110)
GLUCOSE SERPL-MCNC: 84 MG/DL (ref 74–99)
HCT VFR BLD AUTO: 32 % (ref 35–45)
HGB BLD-MCNC: 10.5 G/DL (ref 12–16)
MCH RBC QN AUTO: 26.4 PG (ref 24–34)
MCHC RBC AUTO-ENTMCNC: 32.8 G/DL (ref 31–37)
MCV RBC AUTO: 80.4 FL (ref 78–100)
PLATELET # BLD AUTO: 219 K/UL (ref 135–420)
PMV BLD AUTO: 10.6 FL (ref 9.2–11.8)
POTASSIUM SERPL-SCNC: 4.8 MMOL/L (ref 3.5–5.5)
RBC # BLD AUTO: 3.98 M/UL (ref 4.2–5.3)
SODIUM SERPL-SCNC: 141 MMOL/L (ref 136–145)
WBC # BLD AUTO: 8 K/UL (ref 4.6–13.2)

## 2021-09-09 PROCEDURE — 82962 GLUCOSE BLOOD TEST: CPT

## 2021-09-09 PROCEDURE — 74011250637 HC RX REV CODE- 250/637: Performed by: HOSPITALIST

## 2021-09-09 PROCEDURE — 80048 BASIC METABOLIC PNL TOTAL CA: CPT

## 2021-09-09 PROCEDURE — 97530 THERAPEUTIC ACTIVITIES: CPT

## 2021-09-09 PROCEDURE — 85027 COMPLETE CBC AUTOMATED: CPT

## 2021-09-09 PROCEDURE — 74011250636 HC RX REV CODE- 250/636: Performed by: FAMILY MEDICINE

## 2021-09-09 PROCEDURE — 74011000250 HC RX REV CODE- 250: Performed by: PHYSICIAN ASSISTANT

## 2021-09-09 PROCEDURE — 97116 GAIT TRAINING THERAPY: CPT

## 2021-09-09 PROCEDURE — 74011250636 HC RX REV CODE- 250/636: Performed by: PHYSICIAN ASSISTANT

## 2021-09-09 PROCEDURE — 36415 COLL VENOUS BLD VENIPUNCTURE: CPT

## 2021-09-09 PROCEDURE — 74011000250 HC RX REV CODE- 250: Performed by: FAMILY MEDICINE

## 2021-09-09 PROCEDURE — 74011250636 HC RX REV CODE- 250/636: Performed by: HOSPITALIST

## 2021-09-09 RX ORDER — AMLODIPINE BESYLATE 5 MG/1
5 TABLET ORAL DAILY
Status: DISCONTINUED | OUTPATIENT
Start: 2021-09-09 | End: 2021-09-09 | Stop reason: HOSPADM

## 2021-09-09 RX ORDER — AZITHROMYCIN 250 MG/1
500 TABLET, FILM COATED ORAL DAILY
Status: DISCONTINUED | OUTPATIENT
Start: 2021-09-10 | End: 2021-09-09 | Stop reason: HOSPADM

## 2021-09-09 RX ORDER — AMLODIPINE BESYLATE 5 MG/1
5 TABLET ORAL DAILY
Qty: 30 TABLET | Refills: 0 | Status: SHIPPED
Start: 2021-09-09

## 2021-09-09 RX ADMIN — HYDROCHLOROTHIAZIDE 25 MG: 25 TABLET ORAL at 09:06

## 2021-09-09 RX ADMIN — ONDANSETRON 4 MG: 2 INJECTION INTRAMUSCULAR; INTRAVENOUS at 14:21

## 2021-09-09 RX ADMIN — AMLODIPINE BESYLATE 5 MG: 5 TABLET ORAL at 10:46

## 2021-09-09 RX ADMIN — MULTIPLE VITAMINS W/ MINERALS TAB 1 TABLET: TAB at 09:06

## 2021-09-09 RX ADMIN — OMEGA-3-ACID ETHYL ESTERS 1 CAPSULE: 1 CAPSULE, LIQUID FILLED ORAL at 09:06

## 2021-09-09 RX ADMIN — HYDRALAZINE HYDROCHLORIDE 20 MG: 20 INJECTION INTRAMUSCULAR; INTRAVENOUS at 12:44

## 2021-09-09 RX ADMIN — CEFTRIAXONE SODIUM 2 G: 2 INJECTION, POWDER, FOR SOLUTION INTRAMUSCULAR; INTRAVENOUS at 12:45

## 2021-09-09 RX ADMIN — HEPARIN SODIUM 5000 UNITS: 5000 INJECTION INTRAVENOUS; SUBCUTANEOUS at 09:09

## 2021-09-09 RX ADMIN — TELMISARTAN 40 MG: 40 TABLET ORAL at 09:06

## 2021-09-09 RX ADMIN — AZITHROMYCIN MONOHYDRATE 500 MG: 500 INJECTION, POWDER, LYOPHILIZED, FOR SOLUTION INTRAVENOUS at 12:44

## 2021-09-09 RX ADMIN — HEPARIN SODIUM 5000 UNITS: 5000 INJECTION INTRAVENOUS; SUBCUTANEOUS at 01:41

## 2021-09-09 NOTE — PROGRESS NOTES
D/C Plan: [facility]    Pt has been accepted to this facility. Pt/ are aware and in agreement. Anticipate pt will transition to this facility today. CM has requested a 2:00pm transport. 1400:  CM has received a copy of pt's vaccination record and it has been forwarded to [facility]. Anticipate pt will transition to [facility] today. Transition of Care Plan: [facility]    Communication to Patient/Family:  Met with patient and family, and they are agreeable to the transition plan. The Plan for Transition of Care is related to the following treatment goals: SNF    The Patient and/or patient representative was provided with a choice of provider and agrees   with the discharge plan. Yes [x] No []    Freedom of choice list was provided with basic dialogue that supports the patient's individualized plan of care/goals and shares the quality data associated with the providers. Yes [x] No []    SNF/Rehab Transition:  Patient has been accepted to [facility] at [address] [place] [address], [address] [place] [address] for SNF and meets criteria for admission. Patient will transported by medical transport and expected to leave at 2:00pm today. Communication to SNF/Rehab:  Bedside RN,  has been notified to update the transition plan to the facility and call report [phone]. Discharge information has been updated on the AVS and communicated through [facility] or CC Link. Discharge instructions to be fax'd to facility at [phone]     Please include all hard scripts for controlled substances, med rec and dc summary, and AVS in packet. Please medicate for pain prior to dc if possible and needed to help offset delay when patient first arrives to facility.     Reviewed and confirmed with facility, [facility] can manage the patient care needs for the following:     Marianna Pedraza with (X) only those applicable:  Medication:  []Medications are available at the facility  []IV Antibiotics    []Controlled Substance  hard copies available sent. []Weekly Labs    Equipment:  []CPAP/BiPAP  []Wound Vacuum  []Glover or Urinary Device  []PICC/Central Line  []Nebulizer  []Ventilator    Treatment:  []Isolation (for MRSA, VRE, etc.)  []Surgical Drain Management  []Tracheostomy Care  []Dressing Changes  []Dialysis with transportation  []PEG Care  []Oxygen  []Daily Weights for Heart Failure    Dietary:  []Any diet limitations  []Tube Feedings   []Total Parenteral Management (TPN)    Financial Resources:  []Medicaid Application Completed    []UAI Completed and copy given to pt/family. []A screening has previously been completed. []Level II Completed    [] Private pay individual who will not become   financially eligible for Medicaid within 6 months from admission to a [address] [place] [address]. [x] Individual refused to have screening conducted. []Medicaid Application Completed    []The screening denied because it was determined individual did not need/did not qualify for nursing facility level of care. [] Out of state residents seeking direct admission to a [address] facility. [] Individuals who are inpatients of an out of state hospital, or in state or out of state veterans/ hospital and seek direct admission to a [address] facility  [] Individuals who are pateints or residents of a state owned/operated facility that is licensed by [employer] ([employer]) and seek direct admission to [address] [place] [address]  [] A screening not required for enrollment in [address] [place] [address] [address] services as set out in [address] [place] [address] 13-  [] [facility]) staff shall perform screenings of the [facility] clients. Advanced Care Plan:  []Surrogate Decision Maker of Care  []POA  []Communicated Code Status and copy sent.     Other:         Care Management Interventions  Mode of Transport at Discharge: BLS  Transition of Care Consult (CM Consult): SNF, Discharge Planning  Health Maintenance Reviewed: Yes  Physical Therapy Consult: Yes  Occupational Therapy Consult: Yes  Speech Therapy Consult: Yes  Support Systems: Spouse/Significant Other/Partner  Read Only, Retired: Current Support Network: Lives with Spouse  The Plan for Transition of Care is Related to the Following Treatment Goals : SNF  The Patient and/or Patient Representative was Provided with a Choice of Provider and Agrees with the Discharge Plan?: Yes  Name of the Patient Representative Who was Provided with a Choice of Provider and Agrees with the Discharge Plan: pt/spouse  Freedom of Choice List was Provided with Basic Dialogue that Supports the Patient's Individualized Plan of Care/Goals, Treatment Preferences and Shares the Quality Data Associated with the Providers?: Yes  Discharge Location  Discharge Placement: Skilled nursing facility

## 2021-09-09 NOTE — ROUTINE PROCESS
7420 Bedside and verbal shift change report given to Maria Teresa Mejias RN (on coming nurse) by Minerva Shearer, RN (off going nurse). Report included the following information SBAR, Kardex, OR Summary, Intake/Output and MAR.    1452 Pt discharge from hospital to facility for rehab. Report called to [facility] spoke with LETICIA Don regarding MAR, code status, allergies, diet, hospital summary and any follow up appointments. Personal property sent along with patient. Properly removed armband. D/C'd IV. Cath CDI. Awaiting EMS transport. Resp even and non-laborerd. No s/s of distress noted.

## 2021-09-09 NOTE — PROGRESS NOTES
Problem: Mobility Impaired (Adult and Pediatric)  Goal: *Acute Goals and Plan of Care (Insert Text)  Description: Physical Therapy Goals   Initiated 9/3/2021 and to be accomplished within 3-4 day(s)  1. Patient will move from supine <> sit with S in prep for out of bed activity and change of position. 2.  Patient will perform sit<> stand with S with LRAD in prep for transfers/ambulation. 3.  Patient will transfer from bed <> chair with S with LRAD for time up in chair for completion of ADL activity. 4.  Patient will ambulate 100 feet with S/LRAD for improved functional mobility at discharge. 5.  Patient will ascend/descend 3-5 stairs with handrail(s) with minimal assistance/contact guard assist for home re-entry as needed. Outcome: Progressing Towards Goal   PHYSICAL THERAPY TREATMENT    Patient: Luis Lay (80 y.o. female)  Date: 9/9/2021  Diagnosis: CAP (community acquired pneumonia) [J18.9]  Suspected COVID-19 virus infection [Z20.822]  COVID-19 vaccine series completed [Z92.29] CAP (community acquired pneumonia)    Precautions: Fall  PLOF: ambulatory with cane    ASSESSMENT:  Performed knee flex/ext on (B)LEs 10x to warm up. Mod/maxA to sit up EOB. Bed elevated to assist with sit to stand. Pt able to take side steps up to [facility] with RW and modA. Returend to sitting and pt reports she needs to have a BM. MaxA back to supine. Pt rolled R and L multiple time for bed pan placement due to it hurting pt hips. Left pt with call bell. Progression toward goals:   []      Improving appropriately and progressing toward goals  [x]      Improving slowly and progressing toward goals  []      Not making progress toward goals and plan of care will be adjusted     PLAN:  Patient continues to benefit from skilled intervention to address the above impairments. Continue treatment per established plan of care.   Discharge Recommendations:  [facility]  Further Equipment Recommendations for Discharge:  rolling walker     SUBJECTIVE:   Patient stated I can wait.  (referring to having a BM)    OBJECTIVE DATA SUMMARY:   Critical Behavior:  Neurologic State: Alert  Orientation Level: Oriented to person, Oriented to place, Oriented to situation, Disoriented to time  Cognition: Follows commands  Safety/Judgement: Fall prevention, Decreased awareness of environment  Functional Mobility Training:  Bed Mobility:  Rolling: Minimum assistance  Supine to Sit: Moderate assistance;Maximum assistance  Sit to Supine: Maximum assistance  Scooting: Total assistance  Transfers:  Sit to Stand: Moderate assistance  Stand to Sit: Minimum assistance  Balance:  Sitting: Intact  Standing: Impaired; With support  Standing - Static: Fair (-)  Standing - Dynamic : Fair Tana Pry/poor)   Ambulation/Gait Training:  Distance (ft): 2 Feet (ft)  Assistive Device: Gait belt;Walker, rolling  Ambulation - Level of Assistance: Moderate assistance  Gait Abnormalities: Antalgic;Decreased step clearance  Speed/Yue: Delayed  Step Length: Right shortened;Left shortened  Therapeutic Exercises:   (B)LEs      EXERCISE   Sets   Reps   Active Active Assist   Passive Self ROM   Comments   Ankle Pumps    [] [] [] []    Quad Sets/Glut Sets    [] [] [] [] Hold for 5 secs   Hamstring Sets   [] [] [] []    Short Arc Quads   [] [] [] []    Heel Slides  10 [] [x] [] []    Straight Leg Raises   [] [] [] []    Hip Add   [] [] [] [] Hold for 5 secs, w/ pillow squeeze   Long Arc Quads   [] [] [] []    Seated Marching   [] [] [] []    Standing Marching   [] [] [] []       [] [] [] []        Pain:  Pain level pre-treatment: 0/10  Pain level post-treatment: 5/10   Pain Intervention(s): Medication (see MAR); Rest, Ice, Repositioning   Response to intervention: Nurse notified, See doc flow    Activity Tolerance:   Fair-  Please refer to the flowsheet for vital signs taken during this treatment.   After treatment:   [] Patient left in no apparent distress sitting up in chair  [x] Patient left in no apparent distress in bed  [x] Call bell left within reach  [] Nursing notified  [x] Caregiver present  [] Bed alarm activated  [] SCDs applied      COMMUNICATION/EDUCATION:   [x]         Role of Physical Therapy in the acute care setting. [x]         Fall prevention education was provided and the patient/caregiver indicated understanding. [x]         Patient/family have participated as able in working toward goals and plan of care. [x]         Patient/family agree to work toward stated goals and plan of care. []         Patient understands intent and goals of therapy, but is neutral about his/her participation.   []         Patient is unable to participate in stated goals/plan of care: ongoing with therapy staff.  []         Other:        Kaya Tan, PTA   Time Calculation: 23 mins

## 2021-09-09 NOTE — PROGRESS NOTES
Goal: *Acute Goals and Plan of Care (Insert Text)  Description: Initial Occupational Therapy Goals (9/5/2021) Within 7 day(s):     1. Patient will perform perform grooming standing sink level for 5 minutes for increased independence in ADLs. 2. Patient will perform UB dressing with min A seated EOB for increased independence with ADLs. 3. Patient will perform LB dressing with min A & A/E PRN for increased independence with ADLs. 4. Patient will perform all aspects of toileting with min A for increased independence with ADLs. 5. Patient will perform LE ADLs utilizing body mechanics & adaptive strategies with 1 verbal cue for increased safety in ADLs. 6. Patient will independently apply energy conservation techniques with no verbal cue(s) for increased independence with ADLs.  9/9/2021 1442 by Coleman Lord OT  Outcome: Progressing Towards Goal      OCCUPATIONAL THERAPY TREATMENT    Patient: Luis Ramírez (80 y.o. female)  Date: 9/9/2021  Diagnosis: CAP (community acquired pneumonia) [J18.9]  Suspected COVID-19 virus infection [Z20.822]  COVID-19 vaccine series completed [Z92.29] CAP (community acquired pneumonia)       Precautions: Fall  PLOF:     Chart, occupational therapy assessment, plan of care, and goals were reviewed. ASSESSMENT:  Pt presented supine in bed and agrees to participate with therapy. Reports pain at alexandre knees and requires total-max A for bed mobility, supine<>sit with max A and unable to sit for more than 5 seconds during this session. Reports she feels like she is going to have a bowel movement and does not want to stress and strain and get dirty. Pt returned to bed at the end of session in NAD.      Progression toward goals:  []          Improving appropriately and progressing toward goals  [x]          Improving slowly and progressing toward goals  []          Not making progress toward goals and plan of care will be adjusted     PLAN:  Patient continues to benefit from skilled intervention to address the above impairments. Continue treatment per established plan of care. Discharge Recommendations:  Skilled Nursing Facility  Further Equipment Recommendations for Discharge:  N/A     SUBJECTIVE:   Patient stated  My knee is hurting today and my stomach is still upset.     OBJECTIVE DATA SUMMARY:   Cognitive/Behavioral Status:  Neurologic State: Alert  Orientation Level: Oriented to person, Oriented to situation, Oriented to place  Cognition: Follows commands  Safety/Judgement: Fall prevention    Functional Mobility and Transfers for ADLs:   Bed Mobility:  Rolling: Maximum assistance; Total assistance  Supine to Sit: Maximum assistance  Sit to Supine: Maximum assistance  Scooting: Maximum assistance; Total assistance  Balance:  Sitting: Impaired  Sitting - Static: Good (unsupported)  Sitting - Dynamic: Fair (occasional)  Standing: Impaired; With support  Standing - Static: Fair (-)  Standing - Dynamic : Fair (-/poor)  ADL Intervention:  Cognitive Retraining  Safety/Judgement: Fall prevention    Pain:  Pain level pre-treatment: 7/10   Pain level post-treatment: 7/10  Pain Intervention(s): Medication (see MAR); Rest, Ice, Repositioning   Response to intervention: Nurse notified, See doc flow    Activity Tolerance:    Poor     Please refer to the flowsheet for vital signs taken during this treatment. After treatment:   []  Patient left in no apparent distress sitting up in chair  [x]  Patient left in no apparent distress in bed  [x]  Call bell left within reach  [x]  Nursing notified  []  Caregiver present  []  Bed alarm activated    COMMUNICATION/EDUCATION:   [x] Role of Occupational Therapy in the acute care setting  [] Home safety education was provided and the patient/caregiver indicated understanding. [x] Patient/family have participated as able in working towards goals and plan of care. [x] Patient/family agree to work toward stated goals and plan of care.   [] Patient understands intent and goals of therapy, but is neutral about his/her participation. [] Patient is unable to participate in goal setting and plan of care.       Thank you for this referral.  Jamari Barnett, OTR/L  Time Calculation: 10 mins

## 2021-09-09 NOTE — PROGRESS NOTES
Comprehensive Nutrition Assessment    Type and Reason for Visit: Initial, RD nutrition re-screen/LOS    Nutrition Recommendations/Plan: Will order Glucerna BID    Nutrition Assessment:  Hx of diabetes, hypertension, CAD, hypothyroidism, hyperlipidemia, GERD. patient was admitted w/ dizziness- better and community acquired pneumonia. noted possible d/c today. Estimated Daily Nutrient Needs:  Energy (kcal): 1333; Weight Used for Energy Requirements: Current  Protein (g): 53; Weight Used for Protein Requirements: Current (1g)  Fluid (ml/day): 1333; Method Used for Fluid Requirements: 1 ml/kcal    Nutrition Related Findings:  Labs reviewed. med: lovaza, mvi, toprol xl, melatonin, humalog. +BM 9/8. Noted poor po intake of 0-25%. Wounds:    None       Current Nutrition Therapies:  ADULT DIET Dysphagia - Soft & Bite Sized; 4 carb choices (60 gm/meal)    Anthropometric Measures:  · Height:  4' 7.91\" (142 cm)  · Current Body Wt:  53.3 kg (117 lb 8.1 oz)      · Usual Body Wt:  48.1 kg (106 lb) (4/2021)     · Ideal Body Wt:  80 lbs:  146.9 %   · BMI Category: Overweight (BMI 25.0-29. 9)       Nutrition Diagnosis:   · Inadequate energy intake related to acute injury/trauma as evidenced by intake 0-25%    Nutrition Interventions:   Food and/or Nutrient Delivery: Continue current diet, Start oral nutrition supplement  Nutrition Education and Counseling: No recommendations at this time  Coordination of Nutrition Care: Continue to monitor while inpatient    Goals:  PO intake >50% at most meals throughout the next 4-7 days       Nutrition Monitoring and Evaluation:   Behavioral-Environmental Outcomes: None identified  Food/Nutrient Intake Outcomes: Food and nutrient intake, Supplement intake  Physical Signs/Symptoms Outcomes: Biochemical data, Meal time behavior, Skin, Weight    Discharge Planning:    Continue current diet     Electronically signed by Lucretia Ferrari RD on 9/9/2021 at 1:01 PM

## 2021-09-09 NOTE — DISCHARGE SUMMARY
Discharge Summary    Patient: Jose Bolanos MRN: [id number]  CSN: [id number]    YOB: 1932  Age: 80 y.o.   Sex: female    DOA: 9/2/2021 LOS:  LOS: 7 days   Discharge Date:      Primary Care Provider:  Mery Villanueva MD    Admission Diagnoses: CAP (community acquired pneumonia) [J18.9]  Suspected COVID-19 virus infection [Z20.822]  COVID-19 vaccine series completed [Z92.29]    Discharge Diagnoses:    Problem List as of 9/9/2021 Date Reviewed: 1/23/2019        Codes Class Noted - Resolved    Encounter for palliative care ICD-10-CM: Z51.5  ICD-9-CM: V66.7  Unknown - Present        * (Principal) CAP (community acquired pneumonia) ICD-10-CM: J18.9  ICD-9-CM: 486  9/2/2021 - Present        COVID-19 vaccine series completed ICD-10-CM: Z92.29  ICD-9-CM: V87.49  9/2/2021 - Present        Suspected COVID-19 virus infection ICD-10-CM: Z20.822  ICD-9-CM: V01.79  9/2/2021 - Present        CAD (coronary artery disease) ICD-10-CM: I25.10  ICD-9-CM: 414.00  Unknown - Present        Dementia ([address] ICD-10-CM: F03.90  ICD-9-CM: 294.20  9/2/2021 - Present        Delirium ICD-10-CM: R41.0  ICD-9-CM: 780.09  9/2/2021 - Present        Acquired hypothyroidism ICD-10-CM: E03.9  ICD-9-CM: 244.9  9/2/2021 - Present        Lumbar spinal stenosis ICD-10-CM: M48.061  ICD-9-CM: 724.02  1/15/2019 - Present        Lumbar spondylosis ICD-10-CM: M47.816  ICD-9-CM: 721.3  1/15/2019 - Present        Radiculopathy of leg ICD-10-CM: M54.10  ICD-9-CM: 724.4  1/15/2019 - Present        Aortic valve disorders ICD-10-CM: I35.9  ICD-9-CM: 424.1  12/17/2013 - Present        Back pain ICD-10-CM: M54.9  ICD-9-CM: 724.5  10/10/2013 - Present        Dizziness ICD-10-CM: R42  ICD-9-CM: 780.4  6/13/2013 - Present        H/O myocardial infarction, greater than 8 weeks ICD-10-CM: I25.2  ICD-9-CM: 412  6/13/2013 - Present        Hypertension ICD-10-CM: I10  ICD-9-CM: 401.9  Unknown - Present        Diabetes ([facility] [address] ICD-10-CM: E11.9  ICD-9-CM: 250.00 page  if  not already present. Carolina Gilliland   Associate       04/05/19 1537   Quaker practices/obdulia;Discussed death;Discussed afterlife   Outcome Comfort;Expressed gratitude;Engaged in conversation;Coping;Expressed feelings/needs/concerns; Shared reminiscences; Receptive Unknown - Present    Overview Signed 1/3/2011  7:25 PM by Candance Drown     adult onset             Fatigue ICD-10-CM: R53.83  ICD-9-CM: 780.79  Unknown - Present        Hyperlipidemia ICD-10-CM: E78.5  ICD-9-CM: 272.4  Unknown - Present        RESOLVED: Chest pain, unspecified ICD-10-CM: R07.9  ICD-9-CM: 786.50  10/10/2013 - 9/2/2021        RESOLVED: Palpitations ICD-10-CM: R00.2  ICD-9-CM: 785.1  Unknown - 6/13/2013              Discharge Medications:     Current Discharge Medication List      START taking these medications    Details   amLODIPine (NORVASC) 5 mg tablet Take 1 Tablet by mouth daily. Qty: 30 Tablet, Refills: 0  Start date: 9/9/2021         CONTINUE these medications which have NOT CHANGED    Details   benzonatate (TESSALON) 100 mg capsule Take 100 mg by mouth three (3) times daily as needed for Cough. cyanocobalamin (VITAMIN B-12) 1,000 mcg tablet Take 1,000 mcg by mouth daily. hydrocortisone (ANUSOL-HC) 2.5 % rectal cream Insert  into rectum two (2) times a day. montelukast (SINGULAIR) 10 mg tablet Take 10 mg by mouth every evening. gentamicin-prednisoLONE (PRED-G) 0.3-1 % ophthalmic drops Administer 1 Drop to both eyes four (4) times daily. albuterol (PROVENTIL HFA) 90 mcg/actuation inhaler Take 2 Puffs by inhalation every four (4) hours as needed for Wheezing. metoprolol succinate (TOPROL-XL) 25 mg XL tablet Take 25 mg by mouth nightly. psyllium (METAMUCIL) packet Take 1 Packet by mouth nightly. multivitamin, tx-iron-ca-min (THERA-M W/ IRON) 9 mg iron-400 mcg tab tablet Take 1 Tab by mouth daily. Omega-3 Fatty Acids (FISH OIL) 500 mg cap Take  by mouth daily. telmisartan-hydroCHLOROthiazide (MICARDIS HCT) 40-12.5 mg per tablet Take 1 Tab by mouth daily. ferrous sulfate (IRON) 325 mg (65 mg iron) tablet Take  by mouth Daily (before breakfast). sitaGLIPtin (JANUVIA) 25 mg tablet Take 25 mg by mouth daily.       levothyroxine (SYNTHROID) 25 mcg tablet Take  by mouth nightly. Calcium Carbonate-Vit D3-Min (CALCIUM-VITAMIN D) 600-400 mg-unit Tab Take 1 Tab by mouth two (2) times a day. pravastatin (PRAVACHOL) 40 mg tablet Take 40 mg by mouth nightly. esomeprazole (NEXIUM) 40 mg capsule Take 40 mg by mouth daily. STOP taking these medications       ondansetron (Zofran ODT) 4 mg disintegrating tablet Comments:   Reason for Stopping:         cyclobenzaprine (FLEXERIL) 10 mg tablet Comments:   Reason for Stopping:         oxyCODONE-acetaminophen (PERCOCET) 5-325 mg per tablet Comments:   Reason for Stopping:         ondansetron hcl (ZOFRAN) 4 mg tablet Comments:   Reason for Stopping:         nitrofurantoin (MACRODANTIN) 50 mg capsule Comments:   Reason for Stopping:         acetaminophen (TYLENOL) 325 mg tablet Comments:   Reason for Stopping:               Discharge Condition: Good    Procedures : None    Consults: None      PHYSICAL EXAM   Visit Vitals  BP (!) 165/74   Pulse 87   Temp 98.4 °F (36.9 °C)   Resp 26   Ht 4' 7.91\" (1.42 m)   Wt 53.3 kg (117 lb 8.1 oz)   SpO2 100%   BMI 26.43 kg/m²     General: Awake, cooperative, no acute distress    HEENT: NC, Atraumatic. PERRLA, EOMI. Anicteric sclerae. Lungs:  CTA Bilaterally. No Wheezing/Rhonchi/Rales. Heart:  Regular  rhythm,  No murmur, No Rubs, No Gallops  Abdomen: Soft, Non distended, Non tender. +Bowel sounds,   Extremities: No c/c/e  Psych:   Not anxious or agitated. Neurologic:  No acute neurological deficits. Admission HPI :   Luis Moore is a 80 y.o. female with diabetes, hypertension, CAD, hypothyroidism, hyperlipidemia, GERD presents to ER with concerns of dizziness. Patient speaks [place], per  she woke up this morning and went to urinate when she felt lightheadedness and dizzy. Subsequent to that she vomited several times. Other symptoms include generalized malaise body pains, cough.   She denies any fever, chills, chest pain, shortness of breath, diarrhea, constipation, headache. In ER her white count in normal range, her lactic acid 2.69 her BUNs/creatinine at 42/1. 35.  CT head with no acute findings, CT chest abdomen pelvis showed no acute intraabdominal findings no evidence of bowel obstruction, small hiatal hernia. Scattered areas of pulmonary interstitial and groundglass opacities within both lower lobes, may represent infectious process, atelectasis or sequela of edema. Mild cardiomegaly. Hospital Course :   Ms. Ligia Cuellar was admitted to medical floor. She did not had any acute events during hospitalization. She complained of dizziness MRI done did not show any acute findings. Community-acquired pneumonia-  Started on ceftriaxone and azithromycin, completed 7 day course     Concern for aspiration versus COVID-19  CT scan showed scattered areas of pulmonary interstitial and groundglass opacities within both lower lobes, may represent infectious process, atelectasis or sequela of edema. Rapid Covid 19 and PCR negative.     CAD-  Continued with beta-blocker and statin     Hypertension-  uncontrolled  Continued with home medications, toprol xl,  telmisartan and hydrochlorothiazide. Added amlodipine     DM-  Held oral hypoglycemics, started on sliding scale insulin and diabetic diet     hypothyroidism-  Continued Synthroid  TSH in normal range.     Dementia    She worked with PT/OT, rehab recommended. Activity: Activity as tolerated    Diet: Diabetic Diet    Follow-up: PCP    Disposition: Rehab    Minutes spent on discharge: 45       Labs: Results:       Chemistry Recent Labs     09/09/21  0330   GLU 84      K 4.8   *   CO2 19*   BUN 22*   CREA 0.82   CA 7.3*   AGAP 8   BUCR 27*      CBC w/Diff Recent Labs     09/09/21  0330   WBC 8.0   RBC 3.98*   HGB 10.5*   HCT 32.0*         Cardiac Enzymes No results for input(s): CPK, CKND1, BRAYAN in the last 72 hours.     No lab exists for component: Gevena Mast Coagulation No results for input(s): PTP, INR, APTT, INREXT in the last 72 hours. Lipid Panel No results found for: CHOL, CHOLPOCT, CHOLX, CHLST, CHOLV, 608543, HDL, HDLP, LDL, LDLC, DLDLP, 831645, VLDLC, VLDL, TGLX, TRIGL, TRIGP, TGLPOCT, CHHD, CHHDX   BNP No results for input(s): BNPP in the last 72 hours. Liver Enzymes No results for input(s): TP, ALB, TBIL, AP in the last 72 hours. No lab exists for component: SGOT, GPT, DBIL   Thyroid Studies Lab Results   Component Value Date/Time    TSH 3.31 09/02/2021 09:45 AM            Significant Diagnostic Studies: MRI BRAIN WO CONT    Result Date: 9/3/2021  EXAM: MRI of the brain without intravenous contrast. INDICATION:  \"persistent dizziness. \" COMPARISON:  No prior MRI is available for direct comparison. CORRELATION (related prior exam):  Recent CT. PROTOCOL:  Routine brain. _______________ FINDINGS:       IMAGE QUALITY:  The exam is overall mildly degraded by motion artifact. BRAIN AND EXTRA-AXIAL SPACE:           ACUTE/SUBACUTE INFARCT:  None. MASS:  None. HEMORRHAGE:  None. SUBDURAL FLUID COLLECTION:  None. HYDROCEPHALUS:  None. ICA AND DOMINANT VA T2 FLOW VOIDS:  Unremarkable. REMOTE CEREBRAL NON-LACUNAR INFARCT:  None definite. REMOTE CEREBELLAR INFARCT:  None definite. STRIVE (STandards for Reporting Vascular changes on nEuroimaging):                            --[place] of white matter hyperintensity (\"leukoaraiosis\") of presumed vascular origin:  Minimal.                            --[place] of chronic lacunes of presumed vascular origin:  None by 3 mm STRIVE criteria. --[place] of perivascular spaces:  None significant. --[place] of \"microbleeds\":   None definite. --Degree of brain atrophy:  Moderate. SELLA/PITUITARY:  Unremarkable.       HEENT:            ORBITS:  There are bilateral lens replacements. PARANASAL SINUSES:  Marked right maxillary sinus atelectasis reflecting sequela of chronic sinusitis. MASTOID AIR CELLS:  Predominantly clear. INCLUDED UPPER CERVICAL LYMPH NODES:  Unremarkable. INCLUDED UPPER PAROTIDS:  Unremarkable. NASOPHARYNX:  Unremarkable. BACKGROUND BONE MARROW SIGNAL:  Unremarkable. SCALP:  Unremarkable. _______________     Generalized chronic changes, however, no acute findings. _______________     CT HEAD WO CONT    Result Date: 9/2/2021  EXAM: CT head CLINICAL INDICATION/HISTORY: dizziness   > Additional: None. COMPARISON: 04/23/2021   > Reference Exam: None. TECHNIQUE: Axial CT imaging of the head was performed without intravenous contrast. Sagittal and coronal reconstructions were performed. One or more dose reduction techniques were used on this CT: automated exposure control, adjustment of the mAs and/or kVp according to patient size, and iterative reconstruction techniques. The specific techniques used on this CT exam have been documented in the patient's electronic medical record. Digital Imaging and Communications in Medicine (DICOM) format image data are available to nonaffiliated external healthcare facilities or entities on a secure, media free, reciprocally searchable basis with patient authorization for at least a 12-month period after this study. _______________ FINDINGS: General comment: Mildly limited due to motion. BRAIN AND POSTERIOR FOSSA: Diffuse atrophy There is no intracranial hemorrhage, mass effect, or midline shift. Mild low attenuation within the periventricular, subcortical and deep white matter bilaterally. EXTRA-AXIAL SPACES AND MENINGES: There are no abnormal extra-axial fluid collections. CALVARIUM: Intact. SINUSES: Clear. OTHER: Bilateral aphakia. _______________     1. No acute intracranial abnormalities.  2.  Diffuse atrophy with mild bilateral white matter disease, although nonspecific, likely represents chronic small vessel ischemic changes. *   *    CT CHEST ABD PELV WO CONT    Result Date: 9/2/2021  EXAM: CT of the chest, abdomen, and pelvis CLINICAL INDICATION/HISTORY: nausea, vomiting, contrast allergy   > Additional: Abdominal pain. COMPARISON: CT abdomen pelvis without contrast 04/23/2021   > Reference Exam: None. TECHNIQUE: Axial CT imaging of the chest, abdomen, and pelvis was performed without intravenous contrast. Oral contrast was not administered. Multiplanar reformats were generated. One or more dose reduction techniques were used on this CT: automated exposure control, adjustment of the mAs and/or kVp according to patient size, and iterative reconstruction techniques. The specific techniques used on this CT exam have been documented in the patient's electronic medical record. Digital Imaging and Communications in Medicine (DICOM) format image data are available to nonaffiliated external healthcare facilities or entities on a secure, media free, reciprocally searchable basis with patient authorization for at least a 12-month period after this study. _______________ FINDINGS: CHEST: LUNGS/AIRWAY: Stable chronic interstitial disease and bronchiectasis within the anterior aspect of the right upper lobe. Scattered areas of interstitial and groundglass opacities within both lower lobes. No dense consolidation. PLEURA: No effusion or pneumothorax. MEDIASTINUM: Heart is mildly enlarged. No pericardial effusion. Small hiatal hernia. LYMPH NODES: No enlarged lymph nodes. =============== ABDOMEN/PELVIS: LIVER, BILIARY: Liver is unremarkable. No biliary dilation. Gallbladder is unremarkable. PANCREAS: Unremarkable. SPLEEN: Unremarkable. ADRENALS: Unremarkable. KIDNEYS/URETERS/BLADDER: No hydronephrosis or hydroureter. No renal or ureteral calculi. Bladder is mildly distended. LYMPH NODES: No enlarged lymph nodes.  GASTROINTESTINAL TRACT: No bowel dilation or wall thickening. Colonic diverticulosis. PELVIC ORGANS: Atrophic uterus versus hysterectomy. VASCULATURE: Mild to moderate aortoiliac atherosclerosis. BONES: No acute abnormality. Multi level thoracic and lumbar spondylosis. Grade I anterolisthesis of L5 on S1, likely on degenerative basis. Minimal retrolisthesis of L2 on L3 and L3 on L4. OTHER: No ascites. _______________     1. No acute intra-abdominal abnormality. No evidence for bowel obstruction. Small hiatal hernia. 2. Scattered areas of pulmonary interstitial and groundglass opacities within both lower lobes, may represent infectious process, atelectasis or sequela of edema. Mild cardiomegaly. * **  *    XR CHEST PORT    Result Date: 9/2/2021  EXAM:  PORTABLE CHEST INDICATION:  Shortness of breath. TECHNIQUE:  Portable, AP view. COMPARISON:  PA view 06/17/2018 ____________________ FINDINGS:  SUPPORT DEVICES: None. HEART AND MEDIASTINUM: Cardiac silhouette is mildly enlarged. Atherosclerotic thoracic aorta. LUNGS AND PLEURAL SPACES: Lungs are mildly hypoinflated. Suggestion of developing retrocardiac, left lower lobe opacity. No pleural effusion or pneumothorax. BONY THORAX AND SOFT TISSUES: No acute osseous abnormality. Degenerative changes around the visualized shoulders. ____________________     Mild hypoinflation. Suggestion of developing retrocardiac, left lower lobe consolidation, atelectasis or pneumonia. *   ** *   **        No results found for this or any previous visit. Please note that this dictation was completed with [employer], the [employer] voice recognition software. Quite often unanticipated grammatical, syntax, homophones, and other interpretive errors are inadvertently transcribed by the computer software. Please disregard these errors. Please excuse any errors that have escaped final proofreading.      CC: Julieta Rivera MD

## 2021-09-09 NOTE — PROGRESS NOTES
Bedside and Verbal shift change report given to ANTONETTE Webb RN (oncoming nurse) by Josefina Batista (offgoing nurse). Report included the following information SBAR, Kardex, Intake/Output and MAR.

## 2021-09-09 NOTE — PROGRESS NOTES
Clinical Pharmacy Note: IV to PO Automatic Conversion    Patient Name: Luis Dawn   YOB: 1932   Medical Record Number: [id number]   Date of Admission: 9/2/2021    Daily Progress Note: 9/9/2021 3:10 PM     Please note the following medication(s) (azithromycin) has/have been changed from IV to PO based on the following critiera:    Patient is taking scheduled oral medications  Patient is tolerating tube feeds at goal rate or an NPO (except for meds), full liquid, soft or regular diet      Current Diet Orders  Active Orders   Diet    ADULT DIET Dysphagia - Soft & Bite Sized; 4 carb choices (60 gm/meal)        New Order:  Azithromycin 500 mg po daily  (for pneumonia CAP)    This IV to PO conversion is based on the [facility] [employer] approved automatic conversion policy for eligible patients. Please call with questions.     Jeffrey Candelario [facility] [place]  Clinical Pharmacist  [phone]

## 2021-09-09 NOTE — PROGRESS NOTES
Physician Progress Note      Amara De Leon  CSN #:                  [id number]  :                       10/19/1932  ADMIT DATE:       2021 8:22 AM  DISCH DATE:  RESPONDING  PROVIDER #:        Asim Angelo MD          QUERY TEXT:    Patient admitted with pneumonia, noted to have a lactic acid of 2.69. If possible, please document in progress notes and discharge summary if you are evaluating and/or treating any of the following: The medical record reflects the following:  Risk Factors: Pneumonia  Clinical Indicators: 21  Lactic acid of 2.69; repeat value 1.00  Treatment: Zithromax; Rocephin; IVF    Thank you,  Yasmine Purvis RN/CCDS  [phone]  Options provided:  -- Lactic acidosis POA  -- Lactic acid level not clinically significant  -- Other - I will add my own diagnosis  -- Disagree - Not applicable / Not valid  -- Disagree - Clinically unable to determine / Unknown  -- Refer to Clinical Documentation Reviewer    PROVIDER RESPONSE TEXT:    This patient has lactic acidosis POA.     Query created by: Payal Mahan on 9/3/2021 9:38 AM      Electronically signed by:  Asim Angelo MD 2021 10:09 AM

## 2021-09-09 NOTE — PROGRESS NOTES
Father Mateus Pollard visited with   Anna Arellano, who is a 80 y.o.,female. Father Mateus Pollard provided [employer] . Chaplains will continue to follow and will provide pastoral care on an as needed/requested basis.  recommends bedside caregivers page  on duty if patient shows signs of acute spiritual or emotional distress.      Fr Frederick Perdue, [address] [facility] - Office

## 2021-09-22 NOTE — PROGRESS NOTES
Physician Progress Note      PATIENT:               Uzbek Sham  CSN #:                  [id number]  :                       10/19/1932  ADMIT DATE:       2021 8:22 AM  DISCH DATE:        2021 3:11 PM  RESPONDING  PROVIDER #:        Roger Romero MD          QUERY TEXT:    Pt admitted with Community-acquired pneumonia. Documentation on progress notes and DC summary- Concern for aspiration versus COVID-19. If possible, please further specify the type of pneumonia being treated. Note: CAP and HCAP indicate where the pneumonia was acquired, not a specific type. The medical record reflects the following:  Risk Factors: 80 yof with multiple chronic medical conditions  Clinical Indicators: CT scan showed scattered areas of pulmonary interstitial and groundglass opacities within both lower lobes, may represent infectious process, atelectasis or sequela of edema. Rapid Covid negative; SARS-CoV-2, LATHA - Not detected  Swallow eval showed no s/sx of aspiration  Afebrile on admission, WBC 8.8, no sputum cultures done, blood culture neg  Treatment: IV Zithromax, IV Rocephin, and IV Decadron    Thank you,  Nighat Norris RN, CCDS  [phone]  Options provided:  -- Bacterial pneumonia  -- Viral pneumonia  -- Aspiration pneumonia  -- Other - I will add my own diagnosis  -- Disagree - Not applicable / Not valid  -- Disagree - Clinically unable to determine / Unknown  -- Refer to Clinical Documentation Reviewer    PROVIDER RESPONSE TEXT:    Provider is clinically unable to determine a response to this query. Query created by: Sean Judge on 2021 12:45 PM      QUERY TEXT:    Patient admitted with Pneumonia. Noted documentation of Dementia with delirium on Hospitalist H&P and progress notes. Additionally, the notes state patient is at risk for delirium  If possible, please document in progress notes and discharge summary if you are evaluating and /or treating any of the following:       The medical record reflects the following:  Risk Factors: 79yo [facility] speaking female with dementia, partially deaf and blind  Clinical Indicators: Patient presented with dizziness, abdominal pain, n/v.  Per the ER physician Pt is oriented to self and place. CT head performed for dizziness: Diffuse atrophy with mild bilateral white matter disease, although  nonspecific, likely represents chronic small vessel ischemic changes  MRI brain: generalized chronic changes  9/3 Hospitalist progress notes : Dementia with delirium -  Pt is at high risk for delirium. Attempt to maintain circadian rhythm, avoid excessive or unnecessary lab draws/needle sticks, unnecessary lines (telemetry leads, IVs, weiss catheters). Minimize psychotropics and physical restraints. Try to have patient family at bedside at night for redirection. Move room closer to nursing station if needed  Treatment: Monitor mental status, CT head, MRI    Salvador Martínez RN, BSN, [address] [place] [address]  [phone]  Options provided:  -- Dementia only, without evidence of delirium  -- Dementia with delirium confirmed as evidenced by, (please provide supporting evidence of delirium). -- Other - I will add my own diagnosis  -- Disagree - Not applicable / Not valid  -- Disagree - Clinically unable to determine / Unknown  -- Refer to Clinical Documentation Reviewer    PROVIDER RESPONSE TEXT:    This patient had Dementia only, without evidence of delirium. Query created by: Will Ethel on 9/16/2021 9:56 AM      QUERY TEXT:    Pt admitted with Pneumonia Pt noted to have elevated creatinine 1.35 that improved to .82 with IV fluids. If possible, please document in the progress notes and discharge summary if you are evaluating and/or treating any of the following: The medical record reflects the following:  Risk Factors: 79yo female, dementia  Clinical Indicators: Patient presented w/ dizziness, n/v and abdominal pain.   9/2 Creat = 1.35  9/3 Creat = 1.18  9/4 Creat = 1.13  9/5 Creat = .99  9/9 Creat = .82  Treatment: IV Ns bolus, monitor I&O, monitor labs    Defined by Kidney Disease Improving Global Outcomes (KDIGO) clinical practice guideline for acute kidney injury:?  -Increase in?SCr?by greater than or equal to 0.3 mg/dl within 48?hours;?or?  -Increase or decrease in?SCr?to greater than or equal to 1.5 times baseline, which is known or presumed to have occurred within the prior 7?days;?or?  -Urine volume < 0.5ml/kg/h for 6 hours?       Denny Chen, RN, BSN, [address] [place] [address]  [phone]  Options provided:  -- Acute kidney failure  -- Acute kidney injury  -- Clinically insignificant  lab value  -- Other - I will add my own diagnosis  -- Disagree - Not applicable / Not valid  -- Disagree - Clinically unable to determine / Unknown  -- Refer to Clinical Documentation Reviewer    PROVIDER RESPONSE TEXT:    This patient had clinically insignificant serum creat. lab value    Query created by: Kam Fleming on 9/16/2021 10:04 AM      Electronically signed by:  Emmette Epley MD 9/22/2021 7:36 PM

## 2021-12-13 NOTE — PROGRESS NOTES
Problem: Mobility Impaired (Adult and Pediatric)  Goal: *Acute Goals and Plan of Care (Insert Text)  In 1-7 days pt will be able to perform:  STG  1. Bed mobility:  Demonstrate proper log-roll technique for safe initiation of rolling for OOB activities. 2.  Supine to sit to supine S with HR for meals. 3.  Sit to stand to sit S with RW/LSO in prep for ambulation. LT.  Gait:  Ambulate 50ft S with RW/LSO, for home/community mobility. 2.  Stair Negotiation:  Ascend/descend >3 steps CGA with HR for home entry. 3.  Activity tolerance: Tolerate up in chair 30 minutes-1 hour for ADLs. 4.  Patient/Family Education:  Patient/family to be independent with HEP for follow-up care and safe discharge. Outcome: Resolved/Met Date Met: 19  physical Therapy TREATMENT/DISCHARGE    Patient: Rafael Orosco (2 y.o. female)  Date: 2019  Diagnosis: LUMBAR SPONDYLOSIS WITH RADICULOPATHY AND LUMBAR STENOSIS AND LUMBOSACRAL STENOSIS  Lumbar spinal stenosis Lumbar spinal stenosis  Procedure(s) (LRB):  RIGHT LUMBAR 4- SACRAL 1 HEMILAMINECTOMY AND DISCTECTOMY WITH C-ARM, \"SPEC POP\" (Right) 1 Day Post-Op  Precautions: Fall, Back, Spinal  Chart, physical therapy assessment, plan of care and goals were reviewed. PLOF: ambulatory with a standard pediatric walker for short distances  ASSESSMENT:  Pt performed log roll to the R with use of bed rail and VC for tech. Donned LSO at EOB with VC. No difficulty with sit to stand. Ambulated 80ft with standard walker, kyphotic posture, decreased stride, very slow pace, no LOB or path deviations. Negotiated 4 steps holding (B) hand rails. VC to doff LSO. VC for log roll back to supine, pillow placed under knees for comfort. EDUCATION don/doff LSO tech and use per protocol, stair nego tech, log roll tech, lumbar precautions.   Progression toward goals:  [x]      Goals met  []      Improving appropriately and progressing toward goals  []      Improving slowly and progressing toward goals  []      Not making progress toward goals and plan of care will be adjusted     PLAN:  Patient will be discharged from physical therapy at this time. Rationale for discharge:  [x] Goals Achieved  [] [address]  [] Patient not participating in therapy  [] Other:  Discharge Recommendations:  Home Health  Further Equipment Recommendations for Discharge:  Has a walker     SUBJECTIVE:   Patient stated Lizeth Salamanca hurts when I move.     OBJECTIVE DATA SUMMARY:     Critical Behavior:  Neurologic State: Alert  Cognition: Appropriate decision making, Appropriate for age attention/concentration, Appropriate safety awareness  Functional Mobility Training:  Bed Mobility:  Rolling: Contact guard assistance;Stand-by assistance  Supine to Sit: Stand-by assistance  Sit to Supine: Contact guard assistance;Stand-by assistance  Transfers:  Sit to Stand: Stand-by assistance;Supervision  Stand to Sit: Supervision  Balance:  Sitting: Intact  Standing: Intact; With support  Ambulation/Gait Training:  Distance (ft): 80 Feet (ft)  Assistive Device: Brace/Splint;Gait belt;Walker  Ambulation - Level of Assistance: Stand-by assistance  Gait Abnormalities: Antalgic;Decreased step clearance  Speed/Yue: Slow;Delayed  Stairs:  Number of Stairs Trained: 4  Stairs - Level of Assistance: Stand-by assistance   Rail Use: Both  Pain:  Pre treatment pain:  0  Post treatment pain:  7  Pain Scale 1: Numeric (0 - 10)  Activity Tolerance:   Fair+  Please refer to the flowsheet for vital signs taken during this treatment.   After treatment:   [] Patient left in no apparent distress sitting up in chair  [x] Patient left in no apparent distress in bed  [x] Call bell left within reach  [x] Nursing notified  [x] Caregiver present  [] Bed alarm activated  Mar Hilton PTA   Time Calculation: 32 mins 13-Apr-2021

## 2021-12-24 ENCOUNTER — APPOINTMENT (OUTPATIENT)
Dept: CT IMAGING | Age: 86
End: 2021-12-24
Attending: EMERGENCY MEDICINE
Payer: MEDICARE

## 2021-12-24 ENCOUNTER — HOSPITAL ENCOUNTER (EMERGENCY)
Age: 86
Discharge: HOME | End: 2021-12-24
Attending: EMERGENCY MEDICINE
Payer: MEDICARE

## 2021-12-24 VITALS
RESPIRATION RATE: 21 BRPM | TEMPERATURE: 97.7 F | HEART RATE: 77 BPM | OXYGEN SATURATION: 100 % | WEIGHT: 100 LBS | DIASTOLIC BLOOD PRESSURE: 65 MMHG | HEIGHT: 55 IN | BODY MASS INDEX: 23.14 KG/M2 | SYSTOLIC BLOOD PRESSURE: 169 MMHG

## 2021-12-24 DIAGNOSIS — K59.00 CONSTIPATION, UNSPECIFIED CONSTIPATION TYPE: ICD-10-CM

## 2021-12-24 DIAGNOSIS — R16.0 LIVER MASS: ICD-10-CM

## 2021-12-24 DIAGNOSIS — N39.0 CHRONIC URINARY TRACT INFECTION: ICD-10-CM

## 2021-12-24 DIAGNOSIS — R10.11 ABDOMINAL PAIN, RIGHT UPPER QUADRANT: Primary | ICD-10-CM

## 2021-12-24 DIAGNOSIS — N28.9 RENAL INSUFFICIENCY: ICD-10-CM

## 2021-12-24 LAB
ALBUMIN SERPL-MCNC: 3.1 G/DL (ref 3.4–5)
ALBUMIN/GLOB SERPL: 0.7 {RATIO} (ref 0.8–1.7)
ALP SERPL-CCNC: 114 U/L (ref 45–117)
ALT SERPL-CCNC: 20 U/L (ref 13–56)
ANION GAP SERPL CALC-SCNC: 7 MMOL/L (ref 3–18)
APPEARANCE UR: CLEAR
AST SERPL-CCNC: 26 U/L (ref 10–38)
BACTERIA URNS QL MICRO: ABNORMAL /HPF
BASOPHILS # BLD: 0 K/UL (ref 0–0.1)
BASOPHILS NFR BLD: 0 % (ref 0–2)
BILIRUB SERPL-MCNC: 0.3 MG/DL (ref 0.2–1)
BILIRUB UR QL: NEGATIVE
BUN SERPL-MCNC: 55 MG/DL (ref 7–18)
BUN/CREAT SERPL: 32 (ref 12–20)
CALCIUM SERPL-MCNC: 9.5 MG/DL (ref 8.5–10.1)
CHLORIDE SERPL-SCNC: 107 MMOL/L (ref 100–111)
CO2 SERPL-SCNC: 26 MMOL/L (ref 21–32)
COLOR UR: YELLOW
CREAT SERPL-MCNC: 1.7 MG/DL (ref 0.6–1.3)
DIFFERENTIAL METHOD BLD: ABNORMAL
EOSINOPHIL # BLD: 0.1 K/UL (ref 0–0.4)
EOSINOPHIL NFR BLD: 1 % (ref 0–5)
EPITH CASTS URNS QL MICRO: ABNORMAL /LPF (ref 0–5)
ERYTHROCYTE [DISTWIDTH] IN BLOOD BY AUTOMATED COUNT: 16.6 % (ref 11.6–14.5)
GLOBULIN SER CALC-MCNC: 4.6 G/DL (ref 2–4)
GLUCOSE SERPL-MCNC: 141 MG/DL (ref 74–99)
GLUCOSE UR STRIP.AUTO-MCNC: NEGATIVE MG/DL
HCT VFR BLD AUTO: 39.1 % (ref 35–45)
HGB BLD-MCNC: 12.5 G/DL (ref 12–16)
HGB UR QL STRIP: ABNORMAL
IMM GRANULOCYTES # BLD AUTO: 0 K/UL (ref 0–0.04)
IMM GRANULOCYTES NFR BLD AUTO: 1 % (ref 0–0.5)
KETONES UR QL STRIP.AUTO: NEGATIVE MG/DL
LACTATE SERPL-SCNC: 1.3 MMOL/L (ref 0.4–2)
LEUKOCYTE ESTERASE UR QL STRIP.AUTO: ABNORMAL
LIPASE SERPL-CCNC: 149 U/L (ref 73–393)
LYMPHOCYTES # BLD: 0.9 K/UL (ref 0.9–3.6)
LYMPHOCYTES NFR BLD: 11 % (ref 21–52)
MCH RBC QN AUTO: 26.2 PG (ref 24–34)
MCHC RBC AUTO-ENTMCNC: 32 G/DL (ref 31–37)
MCV RBC AUTO: 81.8 FL (ref 78–100)
MONOCYTES # BLD: 0.8 K/UL (ref 0.05–1.2)
MONOCYTES NFR BLD: 10 % (ref 3–10)
MUCOUS THREADS URNS QL MICRO: ABNORMAL /LPF
NEUTS SEG # BLD: 6.2 K/UL (ref 1.8–8)
NEUTS SEG NFR BLD: 77 % (ref 40–73)
NITRITE UR QL STRIP.AUTO: NEGATIVE
NRBC # BLD: 0 K/UL (ref 0–0.01)
NRBC BLD-RTO: 0 PER 100 WBC
PH UR STRIP: 5.5 [PH] (ref 5–8)
PLATELET # BLD AUTO: 285 K/UL (ref 135–420)
PMV BLD AUTO: 10.5 FL (ref 9.2–11.8)
POTASSIUM SERPL-SCNC: 4.4 MMOL/L (ref 3.5–5.5)
PROT SERPL-MCNC: 7.7 G/DL (ref 6.4–8.2)
PROT UR STRIP-MCNC: 30 MG/DL
RBC # BLD AUTO: 4.78 M/UL (ref 4.2–5.3)
RBC #/AREA URNS HPF: ABNORMAL /HPF (ref 0–5)
SODIUM SERPL-SCNC: 140 MMOL/L (ref 136–145)
SP GR UR REFRACTOMETRY: 1.02 (ref 1–1.03)
UROBILINOGEN UR QL STRIP.AUTO: 0.2 EU/DL (ref 0.2–1)
WBC # BLD AUTO: 8 K/UL (ref 4.6–13.2)
WBC URNS QL MICRO: ABNORMAL /HPF (ref 0–5)

## 2021-12-24 PROCEDURE — 74177 CT ABD & PELVIS W/CONTRAST: CPT

## 2021-12-24 PROCEDURE — 99284 EMERGENCY DEPT VISIT MOD MDM: CPT

## 2021-12-24 PROCEDURE — 83605 ASSAY OF LACTIC ACID: CPT

## 2021-12-24 PROCEDURE — 80053 COMPREHEN METABOLIC PANEL: CPT

## 2021-12-24 PROCEDURE — 85025 COMPLETE CBC W/AUTO DIFF WBC: CPT

## 2021-12-24 PROCEDURE — 96375 TX/PRO/DX INJ NEW DRUG ADDON: CPT

## 2021-12-24 PROCEDURE — 81001 URINALYSIS AUTO W/SCOPE: CPT

## 2021-12-24 PROCEDURE — 74011250636 HC RX REV CODE- 250/636: Performed by: PHYSICIAN ASSISTANT

## 2021-12-24 PROCEDURE — 74011000636 HC RX REV CODE- 636: Performed by: EMERGENCY MEDICINE

## 2021-12-24 PROCEDURE — 96361 HYDRATE IV INFUSION ADD-ON: CPT

## 2021-12-24 PROCEDURE — 83690 ASSAY OF LIPASE: CPT

## 2021-12-24 PROCEDURE — 96374 THER/PROPH/DIAG INJ IV PUSH: CPT

## 2021-12-24 RX ORDER — SODIUM CHLORIDE 9 MG/ML
100 INJECTION, SOLUTION INTRAVENOUS ONCE
Status: COMPLETED | OUTPATIENT
Start: 2021-12-24 | End: 2021-12-24

## 2021-12-24 RX ORDER — ONDANSETRON 2 MG/ML
4 INJECTION INTRAMUSCULAR; INTRAVENOUS
Status: COMPLETED | OUTPATIENT
Start: 2021-12-24 | End: 2021-12-24

## 2021-12-24 RX ORDER — MORPHINE SULFATE 2 MG/ML
2 INJECTION, SOLUTION INTRAMUSCULAR; INTRAVENOUS
Status: COMPLETED | OUTPATIENT
Start: 2021-12-24 | End: 2021-12-24

## 2021-12-24 RX ADMIN — IOPAMIDOL 100 ML: 612 INJECTION, SOLUTION INTRAVENOUS at 19:53

## 2021-12-24 RX ADMIN — MORPHINE SULFATE 2 MG: 2 INJECTION, SOLUTION INTRAMUSCULAR; INTRAVENOUS at 18:47

## 2021-12-24 RX ADMIN — ONDANSETRON 4 MG: 2 INJECTION INTRAMUSCULAR; INTRAVENOUS at 18:47

## 2021-12-24 RX ADMIN — SODIUM CHLORIDE 100 ML/HR: 9 INJECTION, SOLUTION INTRAVENOUS at 18:49

## 2021-12-24 NOTE — ED TRIAGE NOTES
Pt arrives via wheelchair to triage with report of RUQ pain. Pt reports that the pain started following eating chic jay a today. Denies N/V. Pt moaning in pain at this time.

## 2021-12-24 NOTE — ED PROVIDER NOTES
EMERGENCY DEPARTMENT HISTORY AND PHYSICAL EXAM    Date: 12/24/2021  Patient Name: [place]    History of Presenting Illness     Time Seen:6:04 PM    Chief Complaint   Patient presents with    Abdominal Pain       History Provided By: Patient,     Additional History (Context):   Luis Ann is a 80 y.o. female presents emergency room with complaints of severe right upper quadrant, right side/flank pain that started abruptly approximately 2:00 this afternoon after eating [employer] and having a milkshake. Pain has been constant since then. Varies with intensity. No documented fever. No nausea or vomiting. Does have a history of recurrent urinary tract infections. Just had procedure done recently through [facility] where she had ABX placed inside bladder for 5 days to hopefully treat recurrent UTI. She is here with her  who is acting as . Patient very difficult to understand -  prefers to act as . PCP: Tommy Mercedes MD    Current Outpatient Medications   Medication Sig Dispense Refill    amLODIPine (NORVASC) 5 mg tablet Take 1 Tablet by mouth daily. 30 Tablet 0    benzonatate (TESSALON) 100 mg capsule Take 100 mg by mouth three (3) times daily as needed for Cough.  cyanocobalamin (VITAMIN B-12) 1,000 mcg tablet Take 1,000 mcg by mouth daily.  hydrocortisone (ANUSOL-HC) 2.5 % rectal cream Insert  into rectum two (2) times a day.  montelukast (SINGULAIR) 10 mg tablet Take 10 mg by mouth every evening.  gentamicin-prednisoLONE (PRED-G) 0.3-1 % ophthalmic drops Administer 1 Drop to both eyes four (4) times daily.  albuterol (PROVENTIL HFA) 90 mcg/actuation inhaler Take 2 Puffs by inhalation every four (4) hours as needed for Wheezing.  metoprolol succinate (TOPROL-XL) 25 mg XL tablet Take 25 mg by mouth nightly.  psyllium (METAMUCIL) packet Take 1 Packet by mouth nightly.       multivitamin, tx-iron-ca-min (THERA-M W/ IRON) 9 mg iron-400 mcg tab tablet Take 1 Tab by mouth daily.  Omega-3 Fatty Acids (FISH OIL) 500 mg cap Take  by mouth daily.  telmisartan-hydroCHLOROthiazide (MICARDIS HCT) 40-12.5 mg per tablet Take 1 Tab by mouth daily.  ferrous sulfate (IRON) 325 mg (65 mg iron) tablet Take  by mouth Daily (before breakfast).  sitaGLIPtin (JANUVIA) 25 mg tablet Take 25 mg by mouth daily.  levothyroxine (SYNTHROID) 25 mcg tablet Take  by mouth nightly.  Calcium Carbonate-Vit D3-Min (CALCIUM-VITAMIN D) 600-400 mg-unit Tab Take 1 Tab by mouth two (2) times a day.  pravastatin (PRAVACHOL) 40 mg tablet Take 40 mg by mouth nightly.  esomeprazole (NEXIUM) 40 mg capsule Take 40 mg by mouth daily. Past History     Past Medical History:  Past Medical History:   Diagnosis Date    Adverse effect of anesthesia     very, very sleepy with Anesthesia    Adverse effect of anesthesia     decreased heartrate with colonoscopy    Anemia     Arthritis     osteoarthritis    CAD (coronary artery disease)     Depression     Diabetes ([address]     adult onset    Difficult intubation     went bradycardc with symptoms, had diificuly waking up for 24 hours    Foot fracture 2008    right    GERD (gastroesophageal reflux disease)     Heart attack ([address] 2005    Hypercholesterolemia     Hyperlipidemia     Hypertension     Ill-defined condition 10/2018    reddened itchy bumps on lower abdomen after steroid injections in October, itchy patient states, very red, slightly opened with yellow looking head on them.     Ill-defined condition     history of frequent uti's, especially with stress and pressure on the stomach    Ill-defined condition     history of post nasal drip    Ill-defined condition     history of frequent small BM's at night    Ill-defined condition     recurren bladder infection    Menopause     Mitral regurgitation     Myocardial infarct (HCC)     Nausea & vomiting     severe nausea    Sick sinus syndrome (HCC)     Thyroid disease        Past Surgical History:  Past Surgical History:   Procedure Laterality Date    HX CATARACT REMOVAL Right     HX GI      colonosopy    HX GYN      bartholin cyst    HX HEART CATHETERIZATION  2005    HX HEENT Bilateral     macular puckering    HX HEENT      sinus surgery    HX KNEE ARTHROSCOPY Right     HX ORTHOPAEDIC      cortisone shots to both knees    HX OTHER SURGICAL      shoulder surgery    HX OTHER SURGICAL      eye surgery    HX OTHER SURGICAL  many years ago    sinus surgery    HX OTHER SURGICAL      knee surgery    HX OTHER SURGICAL  10-22 & 10-29    Epidural steroid injection    HX SHOULDER ARTHROSCOPY Right             Family History:  Family History   Problem Relation Age of Onset    Coronary Art Dis Mother     Anemia Neg Hx     Arrhythmia Neg Hx     Asthma Neg Hx     Clotting Disorder Neg Hx     Diabetes Neg Hx     Fainting Neg Hx     Heart Attack Neg Hx     Heart Surgery Neg Hx     High Cholesterol Neg Hx     Hypertension Neg Hx     Pacemaker Neg Hx     Sudden Death Neg Hx        Social History:  Social History     Tobacco Use    Smoking status: Never Smoker    Smokeless tobacco: Never Used   Substance Use Topics    Alcohol use: No    Drug use: No       Allergies: Allergies   Allergen Reactions    Latex, Natural Rubber Not Reported This Time    Aspirin Nausea Only    Iodinated Contrast Media Other (comments)     Reactions: splotches on skin    As per pt and spouse , pt is only allergic to red dye. Pt and spouse were questioned many times and denied allergies  Say that pt did okay with contrast dye last cath done by Dr Kimberly Benitez Other Medication Itching     All \"mycins\"    Pcn [Penicillins] Other (comments)     Reactions: splotches on skin    Sting, Bee Swelling         Review of Systems   Review of Systems   Constitutional: Negative for chills and fever.    Respiratory: Negative for chest tightness and shortness of breath. Cardiovascular: Negative for chest pain and palpitations. Gastrointestinal: Positive for abdominal pain. Negative for abdominal distention, blood in stool, constipation, diarrhea, nausea and vomiting. Genitourinary: Positive for flank pain and frequency. Negative for decreased urine volume, dysuria, hematuria and urgency. Musculoskeletal: Negative for back pain. Neurological: Negative for dizziness, light-headedness and headaches. Physical Exam     Vitals:    12/24/21 1854 12/24/21 1906 12/24/21 1907 12/24/21 2129   BP: (!) 169/65      Pulse:       Resp:       Temp:       SpO2:   100% 100%   Weight:  45.4 kg (100 lb)     Height:  4' 7\" (1.397 m)       Physical Exam  Vitals and nursing note reviewed. Constitutional:       General: She is not in acute distress. Appearance: She is well-developed and well-groomed. She is not ill-appearing or diaphoretic. Comments: Thin, elderly 79 y/o female  Looks uncomfortable - holding abdomen  VSS   HENT:      Mouth/Throat:      Mouth: Mucous membranes are moist.      Pharynx: Oropharynx is clear. Eyes:      Extraocular Movements: Extraocular movements intact. Conjunctiva/sclera: Conjunctivae normal.      Pupils: Pupils are equal, round, and reactive to light. Cardiovascular:      Rate and Rhythm: Normal rate and regular rhythm. Heart sounds: Normal heart sounds. Pulmonary:      Effort: Pulmonary effort is normal.      Breath sounds: Normal breath sounds. Abdominal:      General: Abdomen is flat. Bowel sounds are normal.      Palpations: Abdomen is soft. There is no hepatomegaly, splenomegaly, mass or pulsatile mass. Tenderness: There is abdominal tenderness in the right upper quadrant. There is right CVA tenderness. There is no rebound. Negative signs include Holly's sign and McBurney's sign. Hernia: No hernia is present. Musculoskeletal:      Cervical back: Neck supple.    Skin:     General: Skin is warm and dry. Neurological:      Mental Status: She is alert and oriented to person, place, and time. Psychiatric:         Mood and Affect: Mood is anxious. Behavior: Behavior is cooperative. Nursing note and vitals reviewed         Diagnostic Study Results     Labs -   No results found for this or any previous visit (from the past 12 hour(s)). Radiologic Studies   CT ABD PELV W CONT   Final Result      New large low-density mass in the left hepatic lobe. This is concerning for   malignancy. Recommend liver MRI for further evaluation on nonemergent basis. Small hiatal hernia with gastroesophageal reflux. L5 pars defects bilaterally with grade 1 anterolisthesis. CT Results  (Last 48 hours)               12/24/21 2006  CT ABD PELV W CONT Final result    Impression:      New large low-density mass in the left hepatic lobe. This is concerning for   malignancy. Recommend liver MRI for further evaluation on nonemergent basis. Small hiatal hernia with gastroesophageal reflux. L5 pars defects bilaterally with grade 1 anterolisthesis. Narrative:  EXAM: CT of the abdomen and pelvis       INDICATION: Abdominal pain       COMPARISON: April 23, 2021       TECHNIQUE: Axial CT imaging of the abdomen and pelvis was performed with   intravenous contrast. Multiplanar reformats were generated. One or more dose   reduction techniques were used on this CT: automated exposure control,   adjustment of the mAs and/or kVp according to patient size, and iterative   reconstruction techniques. The specific techniques used on this CT exam have   been documented in the patient's electronic medical record. Digital Imaging and   Communications in Medicine (DICOM) format image data are available to   nonaffiliated external healthcare facilities or entities on a secure, media   free, reciprocally searchable basis with patient authorization for at least a   12-month period after this study. _______________       FINDINGS:       LOWER CHEST: There are chronic interstitial lung changes and bronchiectasis. Stable 2 mm nodule seen in the right middle lobe anteriorly. Cardiomegaly. Small   hiatal hernia. Fluid seen in the distal esophagus suggesting gastroesophageal   reflux. LIVER, BILIARY: There is a new 3.8 x 4.8 cm low-density mass in the left hepatic   lobe lateral segment, concerning for underlying neoplasm. Advise liver MRI for   further evaluation on a nonemergent basis. . No biliary dilation. Gallbladder is   unremarkable. PANCREAS: Normal.       SPLEEN: Normal.       ADRENALS: Normal.       KIDNEYS: Right kidneys unremarkable. There is mild atrophy of the right kidney. VASCULATURE: Moderate calcific atherosclerosis present. LYMPH NODES: No enlarged lymph nodes. GASTROINTESTINAL TRACT: No bowel dilation or wall thickening. Arch amount of   retained stool in the colon. The appendix is normal. There is colonic   diverticulosis. There is no bowel obstruction. Small hiatal hernia present. PELVIC ORGANS: Hysterectomy. Urinary bladder is under distended therefore   difficult to evaluate. Uterus is normal for patient's age. There is a 13 mm   cystic structure off of the left lateral wall of the vagina suggesting   Bartholin's gland cyst.       BONES: No acute or aggressive osseous abnormalities identified. There is   osteopenia. There are pars defects at L5 bilaterally with grade 1   anterolisthesis. Severe degenerative disc disease present at L5-S1 and L2-L3. 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 OTHER: None.       _______________               CXR Results  (Last 48 hours)    None            Medical Decision Making   I am the first provider for this patient.     I reviewed the vital signs, available nursing notes, past medical history, past surgical history, family history and social history. Vital Signs-Reviewed the patient's vital signs. Pulse Oximetry Analysis  100 % RA    Records Reviewed: triage notes, old records / labs / radiology    DDX:  RUQ / Right flank pain - cholecystitis / biliary colic, colitis, diverticulitis, constipation / IBS, kidney stone, pyelonephritis, ischemic bowel  Doubt cardiac or pulmonary    Provider Notes:   80 y.o. female   Procedures:  Procedures    ED Course:   Initial assessment performed. The patients presenting problems have been discussed, and they are in agreement with the care plan formulated and outlined with them. I have encouraged them to ask questions as they arise throughout their visit. ED Physician Discussion Note:  CBC negative  UA appears infected - run culture. Reluctant to start new ABX after recent treatment. Already takes Macrodantin daily. Chemistries - renal insuff / ? Mild dehydration  LFTs negative  Lipase negative  CT - new liver mass ? Malignancy  HH / GERD  Constipation    Discussed with  and patient  Urine culture pending - continue Macrodantin. Return if worse  New liver mass - Recommedned non emergent MRI. Discuss with PCP  Miralax for constipation  Push fluids  DC    Diagnosis and Disposition       DISCHARGE NOTE:  Luis Solis's  results have been reviewed with her. She has been counseled regarding her diagnosis, treatment, and plan. She verbally conveys understanding and agreement of the signs, symptoms, diagnosis, treatment and prognosis and additionally agrees to follow up as discussed. She also agrees with the care-plan and conveys that all of her questions have been answered. I have also provided discharge instructions for her that include: educational information regarding their diagnosis and treatment, and list of reasons why they would want to return to the ED prior to their follow-up appointment, should her condition change.  She has been provided with education for proper emergency department utilization. CLINICAL IMPRESSION:    1. Abdominal pain, right upper quadrant    2. Renal insufficiency    3. Constipation, unspecified constipation type    4. Liver mass    5. Chronic urinary tract infection        PLAN:  1. D/C Home  2. Discharge Medication List as of 12/24/2021  9:30 PM        3. Follow-up Information     Follow up With Specialties Details Why Contact Info    Mario Monahan MD Family Medicine Call  Call on Monday to be seen early next week [phone]      Ruben Smith MD Urology Call  Call on Monday to advise of ER visit and to check on urine culture [id number]      [facility] EMERGENCY DEPT Emergency Medicine  If symptoms worsen, As needed [address] [phone]  [phone]        ____________________________________     Please note that this dictation was completed with [employer], the computer voice recognition software. Quite often unanticipated grammatical, syntax, homophones, and other interpretive errors are inadvertently transcribed by the computer software. Please disregard these errors. Please excuse any errors that have escaped final proofreading.

## 2021-12-25 NOTE — DISCHARGE INSTRUCTIONS
Rest  Push liquids. Keep well-hydrated  Urine culture is pending. Continue Macrodantin as discussed  Notify your urologist on Monday of ER visit and to check on urine culture  Notify your PCP on Monday of ER visit to discuss abnormal CAT scan (liver mass)  Use MiraLAX 1 capful every day for constipation  Worse?   Return to ER immediately

## 2022-01-18 ENCOUNTER — APPOINTMENT (OUTPATIENT)
Dept: CT IMAGING | Age: 87
End: 2022-01-18
Attending: EMERGENCY MEDICINE
Payer: MEDICARE

## 2022-01-18 ENCOUNTER — APPOINTMENT (OUTPATIENT)
Dept: GENERAL RADIOLOGY | Age: 87
End: 2022-01-18
Attending: EMERGENCY MEDICINE
Payer: MEDICARE

## 2022-01-18 ENCOUNTER — HOSPITAL ENCOUNTER (EMERGENCY)
Age: 87
Discharge: OTHER INSTITUTION | End: 2022-01-18
Attending: EMERGENCY MEDICINE | Admitting: EMERGENCY MEDICINE
Payer: MEDICARE

## 2022-01-18 VITALS
SYSTOLIC BLOOD PRESSURE: 160 MMHG | HEART RATE: 74 BPM | BODY MASS INDEX: 18.9 KG/M2 | TEMPERATURE: 97 F | OXYGEN SATURATION: 99 % | DIASTOLIC BLOOD PRESSURE: 58 MMHG | HEIGHT: 56 IN | WEIGHT: 84 LBS | RESPIRATION RATE: 18 BRPM

## 2022-01-18 DIAGNOSIS — S00.83XA CONTUSION OF FACE, INITIAL ENCOUNTER: ICD-10-CM

## 2022-01-18 DIAGNOSIS — S06.5XAA SUBDURAL HEMATOMA: Primary | ICD-10-CM
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
## 2022-01-18 LAB
ALBUMIN SERPL-MCNC: 3.4 G/DL (ref 3.4–5)
ALBUMIN/GLOB SERPL: 0.8 {RATIO} (ref 0.8–1.7)
ALP SERPL-CCNC: 101 U/L (ref 45–117)
ALT SERPL-CCNC: 20 U/L (ref 13–56)
ANION GAP SERPL CALC-SCNC: 6 MMOL/L (ref 3–18)
APPEARANCE UR: CLEAR
APTT PPP: 29 SEC (ref 23–36.4)
AST SERPL-CCNC: 27 U/L (ref 10–38)
ATRIAL RATE: 72 BPM
BACTERIA URNS QL MICRO: ABNORMAL /HPF
BASOPHILS # BLD: 0 K/UL (ref 0–0.1)
BASOPHILS NFR BLD: 0 % (ref 0–2)
BILIRUB SERPL-MCNC: 0.4 MG/DL (ref 0.2–1)
BILIRUB UR QL: NEGATIVE
BNP SERPL-MCNC: 1660 PG/ML (ref 0–1800)
BUN SERPL-MCNC: 44 MG/DL (ref 7–18)
BUN/CREAT SERPL: 31 (ref 12–20)
CALCIUM SERPL-MCNC: 9.6 MG/DL (ref 8.5–10.1)
CALCULATED P AXIS, ECG09: 31 DEGREES
CALCULATED R AXIS, ECG10: -35 DEGREES
CALCULATED T AXIS, ECG11: 95 DEGREES
CHLORIDE SERPL-SCNC: 107 MMOL/L (ref 100–111)
CO2 SERPL-SCNC: 27 MMOL/L (ref 21–32)
COLOR UR: YELLOW
COVID-19 RAPID TEST, COVR: NOT DETECTED
CREAT SERPL-MCNC: 1.44 MG/DL (ref 0.6–1.3)
DIAGNOSIS, 93000: NORMAL
DIFFERENTIAL METHOD BLD: ABNORMAL
EOSINOPHIL # BLD: 0 K/UL (ref 0–0.4)
EOSINOPHIL NFR BLD: 0 % (ref 0–5)
EPITH CASTS URNS QL MICRO: ABNORMAL /LPF (ref 0–5)
ERYTHROCYTE [DISTWIDTH] IN BLOOD BY AUTOMATED COUNT: 16.8 % (ref 11.6–14.5)
GLOBULIN SER CALC-MCNC: 4.2 G/DL (ref 2–4)
GLUCOSE BLD STRIP.AUTO-MCNC: 121 MG/DL (ref 70–110)
GLUCOSE SERPL-MCNC: 146 MG/DL (ref 74–99)
GLUCOSE UR STRIP.AUTO-MCNC: NEGATIVE MG/DL
HCT VFR BLD AUTO: 36.9 % (ref 35–45)
HGB BLD-MCNC: 11.7 G/DL (ref 12–16)
HGB UR QL STRIP: NEGATIVE
IMM GRANULOCYTES # BLD AUTO: 0.1 K/UL (ref 0–0.04)
IMM GRANULOCYTES NFR BLD AUTO: 1 % (ref 0–0.5)
INR PPP: 1.1 (ref 0.8–1.2)
KETONES UR QL STRIP.AUTO: NEGATIVE MG/DL
LEUKOCYTE ESTERASE UR QL STRIP.AUTO: NEGATIVE
LYMPHOCYTES # BLD: 0.3 K/UL (ref 0.9–3.6)
LYMPHOCYTES NFR BLD: 3 % (ref 21–52)
MCH RBC QN AUTO: 26.9 PG (ref 24–34)
MCHC RBC AUTO-ENTMCNC: 31.7 G/DL (ref 31–37)
MCV RBC AUTO: 84.8 FL (ref 78–100)
MONOCYTES # BLD: 0.5 K/UL (ref 0.05–1.2)
MONOCYTES NFR BLD: 4 % (ref 3–10)
NEUTS SEG # BLD: 9.9 K/UL (ref 1.8–8)
NEUTS SEG NFR BLD: 92 % (ref 40–73)
NITRITE UR QL STRIP.AUTO: NEGATIVE
NRBC # BLD: 0 K/UL (ref 0–0.01)
NRBC BLD-RTO: 0 PER 100 WBC
P-R INTERVAL, ECG05: 156 MS
PH UR STRIP: 7.5 [PH] (ref 5–8)
PLATELET # BLD AUTO: 219 K/UL (ref 135–420)
PMV BLD AUTO: 10.2 FL (ref 9.2–11.8)
POTASSIUM SERPL-SCNC: 4 MMOL/L (ref 3.5–5.5)
PROT SERPL-MCNC: 7.6 G/DL (ref 6.4–8.2)
PROT UR STRIP-MCNC: 100 MG/DL
PROTHROMBIN TIME: 13.3 SEC (ref 11.5–15.2)
Q-T INTERVAL, ECG07: 434 MS
QRS DURATION, ECG06: 82 MS
QTC CALCULATION (BEZET), ECG08: 475 MS
RBC # BLD AUTO: 4.35 M/UL (ref 4.2–5.3)
RBC #/AREA URNS HPF: ABNORMAL /HPF (ref 0–5)
SODIUM SERPL-SCNC: 140 MMOL/L (ref 136–145)
SOURCE, COVRS: NORMAL
SP GR UR REFRACTOMETRY: 1.01 (ref 1–1.03)
TROPONIN-HIGH SENSITIVITY: 48 NG/L (ref 0–54)
UROBILINOGEN UR QL STRIP.AUTO: 0.2 EU/DL (ref 0.2–1)
VENTRICULAR RATE, ECG03: 72 BPM
WBC # BLD AUTO: 10.8 K/UL (ref 4.6–13.2)
WBC URNS QL MICRO: ABNORMAL /HPF (ref 0–5)

## 2022-01-18 PROCEDURE — 70486 CT MAXILLOFACIAL W/O DYE: CPT

## 2022-01-18 PROCEDURE — 72125 CT NECK SPINE W/O DYE: CPT

## 2022-01-18 PROCEDURE — 83880 ASSAY OF NATRIURETIC PEPTIDE: CPT

## 2022-01-18 PROCEDURE — 74011000250 HC RX REV CODE- 250: Performed by: EMERGENCY MEDICINE

## 2022-01-18 PROCEDURE — 71045 X-RAY EXAM CHEST 1 VIEW: CPT

## 2022-01-18 PROCEDURE — 85730 THROMBOPLASTIN TIME PARTIAL: CPT

## 2022-01-18 PROCEDURE — 85610 PROTHROMBIN TIME: CPT

## 2022-01-18 PROCEDURE — 80053 COMPREHEN METABOLIC PANEL: CPT

## 2022-01-18 PROCEDURE — 81001 URINALYSIS AUTO W/SCOPE: CPT

## 2022-01-18 PROCEDURE — 84484 ASSAY OF TROPONIN QUANT: CPT

## 2022-01-18 PROCEDURE — 82962 GLUCOSE BLOOD TEST: CPT

## 2022-01-18 PROCEDURE — 87635 SARS-COV-2 COVID-19 AMP PRB: CPT

## 2022-01-18 PROCEDURE — 96365 THER/PROPH/DIAG IV INF INIT: CPT

## 2022-01-18 PROCEDURE — 73060 X-RAY EXAM OF HUMERUS: CPT

## 2022-01-18 PROCEDURE — 93005 ELECTROCARDIOGRAM TRACING: CPT

## 2022-01-18 PROCEDURE — 96366 THER/PROPH/DIAG IV INF ADDON: CPT

## 2022-01-18 PROCEDURE — 70450 CT HEAD/BRAIN W/O DYE: CPT

## 2022-01-18 PROCEDURE — 99285 EMERGENCY DEPT VISIT HI MDM: CPT

## 2022-01-18 PROCEDURE — 85025 COMPLETE CBC W/AUTO DIFF WBC: CPT

## 2022-01-18 RX ORDER — NICARDIPINE HYDROCHLORIDE 0.1 MG/ML
5-15 INJECTION INTRAVENOUS
Status: DISCONTINUED | OUTPATIENT
Start: 2022-01-18 | End: 2022-01-18

## 2022-01-18 RX ADMIN — NICARDIPINE HYDROCHLORIDE 5 MG/HR: 25 INJECTION INTRAVENOUS at 11:21

## 2022-01-18 NOTE — ED NOTES
Report given to Clay Morris RN at [address] [place] [address] ED;    Report given to [facility] ambulance

## 2022-01-18 NOTE — ED TRIAGE NOTES
states heard wife fall off couch around 0630 today, denies LOC  states he could hear her calling for help, fell and hit lt side of head, pt c/o nausea and headache.

## 2022-01-18 NOTE — ED PROVIDER NOTES
EMERGENCY DEPARTMENT HISTORY AND PHYSICAL EXAM    Date: 1/18/2022  Patient Name: Clarence Meyer    History of Presenting Illness     Chief Complaint   Patient presents with    Head Injury         History Provided By: Patient and Patient's     10:07 AM.  Patient speaks both [place] and [place], offered [place]  but she declines preferring for her  to translate  Tei Gael Willis is a 80 y.o. female with PMHX of arthritis, diabetes, CAD, thyroid disease who presents to the emergency department C/O. Per patient and her  she normally sleeps on the couch and this morning when she tried to get up to go to the bathroom she fell and struck the left side of her head.  and wife deny any loss of consciousness, nausea, vomiting.  notes that she had a cough and been short of breath since October when she had pneumonia. Denies any chest pain, neck pain, back pain, fever, bowel or urinary complaints. Notes she has bruising over her right arm from where he had to pool to help her get off the floor. No other relieving or exacerbating factors identified. PCP: Slim Ivan MD    Current Facility-Administered Medications   Medication Dose Route Frequency Provider Last Rate Last Admin    niCARdipine in Saline (CARDENE) 25 MG/250 mL infusion kit  5-15 mg/hr IntraVENous TITRATE Emily Curiel MD         Current Outpatient Medications   Medication Sig Dispense Refill    amLODIPine (NORVASC) 5 mg tablet Take 1 Tablet by mouth daily. 30 Tablet 0    benzonatate (TESSALON) 100 mg capsule Take 100 mg by mouth three (3) times daily as needed for Cough.  cyanocobalamin (VITAMIN B-12) 1,000 mcg tablet Take 1,000 mcg by mouth daily.  hydrocortisone (ANUSOL-HC) 2.5 % rectal cream Insert  into rectum two (2) times a day.  montelukast (SINGULAIR) 10 mg tablet Take 10 mg by mouth every evening.       gentamicin-prednisoLONE (PRED-G) 0.3-1 % ophthalmic drops Administer 1 Drop to both eyes four (4) times daily.  albuterol (PROVENTIL HFA) 90 mcg/actuation inhaler Take 2 Puffs by inhalation every four (4) hours as needed for Wheezing.  metoprolol succinate (TOPROL-XL) 25 mg XL tablet Take 25 mg by mouth nightly.  psyllium (METAMUCIL) packet Take 1 Packet by mouth nightly.  multivitamin, tx-iron-ca-min (THERA-M W/ IRON) 9 mg iron-400 mcg tab tablet Take 1 Tab by mouth daily.  Omega-3 Fatty Acids (FISH OIL) 500 mg cap Take  by mouth daily.  telmisartan-hydroCHLOROthiazide (MICARDIS HCT) 40-12.5 mg per tablet Take 1 Tab by mouth daily.  ferrous sulfate (IRON) 325 mg (65 mg iron) tablet Take  by mouth Daily (before breakfast).  sitaGLIPtin (JANUVIA) 25 mg tablet Take 25 mg by mouth daily.  levothyroxine (SYNTHROID) 25 mcg tablet Take  by mouth nightly.  Calcium Carbonate-Vit D3-Min (CALCIUM-VITAMIN D) 600-400 mg-unit Tab Take 1 Tab by mouth two (2) times a day.  pravastatin (PRAVACHOL) 40 mg tablet Take 40 mg by mouth nightly.  esomeprazole (NEXIUM) 40 mg capsule Take 40 mg by mouth daily. Past History       Past Medical History:  Past Medical History:   Diagnosis Date    Adverse effect of anesthesia     very, very sleepy with Anesthesia    Adverse effect of anesthesia     decreased heartrate with colonoscopy    Anemia     Arthritis     osteoarthritis    CAD (coronary artery disease)     Depression     Diabetes ([address]     adult onset    Difficult intubation     went bradycardc with symptoms, had diificuly waking up for 24 hours    Foot fracture 2008    right    GERD (gastroesophageal reflux disease)     Heart attack ([address] 2005    Hypercholesterolemia     Hyperlipidemia     Hypertension     Ill-defined condition 10/2018    reddened itchy bumps on lower abdomen after steroid injections in October, itchy patient states, very red, slightly opened with yellow looking head on them.     Ill-defined condition     history of frequent uti's, especially with stress and pressure on the stomach    Ill-defined condition     history of post nasal drip    Ill-defined condition     history of frequent small BM's at night    Ill-defined condition     recurren bladder infection    Menopause     Mitral regurgitation     Myocardial infarct (HCC)     Nausea & vomiting     severe nausea    Sick sinus syndrome (HCC)     Thyroid disease        Past Surgical History:  Past Surgical History:   Procedure Laterality Date    HX CATARACT REMOVAL Right     HX GI      colonosopy    HX GYN      bartholin cyst    HX HEART CATHETERIZATION  2005    HX HEENT Bilateral     macular puckering    HX HEENT      sinus surgery    HX KNEE ARTHROSCOPY Right     HX ORTHOPAEDIC      cortisone shots to both knees    HX OTHER SURGICAL      shoulder surgery    HX OTHER SURGICAL      eye surgery    HX OTHER SURGICAL  many years ago    sinus surgery    HX OTHER SURGICAL      knee surgery    HX OTHER SURGICAL  10-22 & 10-29    Epidural steroid injection    HX SHOULDER ARTHROSCOPY Right             Family History:  Family History   Problem Relation Age of Onset    Coronary Art Dis Mother     Anemia Neg Hx     Arrhythmia Neg Hx     Asthma Neg Hx     Clotting Disorder Neg Hx     Diabetes Neg Hx     Fainting Neg Hx     Heart Attack Neg Hx     Heart Surgery Neg Hx     High Cholesterol Neg Hx     Hypertension Neg Hx     Pacemaker Neg Hx     Sudden Death Neg Hx        Social History:  Social History     Tobacco Use    Smoking status: Never Smoker    Smokeless tobacco: Never Used   Substance Use Topics    Alcohol use: No    Drug use: No       Allergies: Allergies   Allergen Reactions    Latex, Natural Rubber Not Reported This Time    Aspirin Nausea Only    Iodinated Contrast Media Other (comments)     Reactions: splotches on skin    As per pt and spouse , pt is only allergic to red dye.  Pt and spouse were questioned many times and denied allergies  Say that pt did okay with contrast dye last cath done by Dr Jose Schwartz Other Medication Itching     All \"mycins\"    Pcn [Penicillins] Other (comments)     Reactions: splotches on skin    Sting, Bee Swelling         Review of Systems   Review of Systems   Constitutional: Negative for fever. Respiratory: Positive for cough and shortness of breath. Cardiovascular: Negative for chest pain. Gastrointestinal: Negative for abdominal pain. Musculoskeletal: Positive for arthralgias. Negative for back pain and neck pain. Skin: Positive for wound. Neurological: Positive for headaches. Negative for syncope. All other systems reviewed and are negative.         Physical Exam     Vitals:    01/18/22 0955 01/18/22 1155   BP: (!) 178/79 (!) 179/88   Pulse: 84 81   Resp: 18 18   Temp: 97.1 °F (36.2 °C) 97 °F (36.1 °C)   SpO2: 98% 99%   Weight: 38.1 kg (84 lb)    Height: 4' 8\" (1.422 m)      Physical Exam    Nursing notes and vital signs reviewed    Constitutional: Non toxic appearing, moderate distress  Head: Normocephalic, Atraumatic  ENT: Ecchymosis and tenderness noted over the left maxilla, no torres sign, no bleeding from bilateral naris  Eyes: EOMI, PERRL  Neck: Supple, no spinal tenderness to palpation  Cardiovascular: Regular rate and rhythm, no murmurs, rubs, or gallops  Chest: Normal work of breathing and chest excursion bilaterally  Lungs: Clear to ausculation bilaterally  Abdomen: Soft, non tender, non distended  Back: No evidence of trauma or deformity  Extremities: Ecchymosis noted over the right upper arm, full range of motion intact, no LE edema  Skin: Warm and dry, normal cap refill  Neuro: Alert and appropriate, face symmetric, normal speech, strength and sensation symmetric bilaterally  Psychiatric: Anxious mood and affect      Diagnostic Study Results     Labs -     Recent Results (from the past 12 hour(s))   EKG, 12 LEAD, INITIAL    Collection Time: 01/18/22 10:26 AM   Result Value Ref Range    Ventricular Rate 72 BPM    Atrial Rate 72 BPM    P-R Interval 156 ms    QRS Duration 82 ms    Q-T Interval 434 ms    QTC Calculation (Bezet) 475 ms    Calculated P Axis 31 degrees    Calculated R Axis -35 degrees    Calculated T Axis 95 degrees    Diagnosis       Normal sinus rhythm  Left axis deviation  Minimal voltage criteria for LVH, may be normal variant ( R in aVL )  Possible Anterior infarct , age undetermined  T wave abnormality, consider lateral ischemia  Abnormal ECG  When compared with ECG of 02-SEP-2021 08:35,  Borderline criteria for Anterior infarct are now present  T wave inversion less evident in Lateral leads     CBC WITH AUTOMATED DIFF    Collection Time: 01/18/22 10:40 AM   Result Value Ref Range    WBC 10.8 4.6 - 13.2 K/uL    RBC 4.35 4.20 - 5.30 M/uL    HGB 11.7 (L) 12.0 - 16.0 g/dL    HCT 36.9 35.0 - 45.0 %    MCV 84.8 78.0 - 100.0 FL    MCH 26.9 24.0 - 34.0 PG    MCHC 31.7 31.0 - 37.0 g/dL    RDW 16.8 (H) 11.6 - 14.5 %    PLATELET 219 566 - 748 K/uL    MPV 10.2 9.2 - 11.8 FL    NRBC 0.0 0  WBC    ABSOLUTE NRBC 0.00 0.00 - 0.01 K/uL    NEUTROPHILS 92 (H) 40 - 73 %    LYMPHOCYTES 3 (L) 21 - 52 %    MONOCYTES 4 3 - 10 %    EOSINOPHILS 0 0 - 5 %    BASOPHILS 0 0 - 2 %    IMMATURE GRANULOCYTES 1 (H) 0.0 - 0.5 %    ABS. NEUTROPHILS 9.9 (H) 1.8 - 8.0 K/UL    ABS. LYMPHOCYTES 0.3 (L) 0.9 - 3.6 K/UL    ABS. MONOCYTES 0.5 0.05 - 1.2 K/UL    ABS. EOSINOPHILS 0.0 0.0 - 0.4 K/UL    ABS. BASOPHILS 0.0 0.0 - 0.1 K/UL    ABS. IMM.  GRANS. 0.1 (H) 0.00 - 0.04 K/UL    DF AUTOMATED     METABOLIC PANEL, COMPREHENSIVE    Collection Time: 01/18/22 10:40 AM   Result Value Ref Range    Sodium 140 136 - 145 mmol/L    Potassium 4.0 3.5 - 5.5 mmol/L    Chloride 107 100 - 111 mmol/L    CO2 27 21 - 32 mmol/L    Anion gap 6 3.0 - 18 mmol/L    Glucose 146 (H) 74 - 99 mg/dL    BUN 44 (H) 7.0 - 18 MG/DL    Creatinine 1.44 (H) 0.6 - 1.3 MG/DL    BUN/Creatinine ratio 31 (H) 12 - 20      GFR est AA 42 (L) >60 ml/min/1.73m2    GFR est non-AA 34 (L) >60 ml/min/1.73m2    Calcium 9.6 8.5 - 10.1 MG/DL    Bilirubin, total 0.4 0.2 - 1.0 MG/DL    ALT (SGPT) 20 13 - 56 U/L    AST (SGOT) 27 10 - 38 U/L    Alk. phosphatase 101 45 - 117 U/L    Protein, total 7.6 6.4 - 8.2 g/dL    Albumin 3.4 3.4 - 5.0 g/dL    Globulin 4.2 (H) 2.0 - 4.0 g/dL    A-G Ratio 0.8 0.8 - 1.7     COVID-19 RAPID TEST    Collection Time: 01/18/22 10:40 AM   Result Value Ref Range    Specimen source Nasopharyngeal      COVID-19 rapid test Not detected NOTD     GLUCOSE, POC    Collection Time: 01/18/22 11:12 AM   Result Value Ref Range    Glucose (POC) 121 (H) 70 - 110 mg/dL       Radiologic Studies -   XR CHEST PORT   Final Result      Stable examination without active cardiopulmonary disease. XR HUMERUS RT   Final Result      1. No significant abnormality. CT MAXILLOFACIAL WO CONT   Final Result   No fracture. CT HEAD WO CONT   Final Result      Large, acute left subdural hematoma with mass effect, without evidence of   herniation. A telephone report was called to Dr. Kayli Fitzpatrick 11:20 PM on 1/18/2022. Results were   understood. CT SPINE CERV WO CONT   Final Result   1. No evidence of acute traumatic injury to the cervical spine. 2. Mild convex left curvature, trace degenerative anterolisthesis at C4-5 and   C7-T1, multilevel degenerative disc disease and facet arthropathy with neural   foraminal stenoses detailed above. CT Results  (Last 48 hours)               01/18/22 1112  CT MAXILLOFACIAL WO CONT Final result    Impression:  No fracture. Narrative:  EXAM:  CT Facial w/o Contrast       INDICATION: Status post fall, facial trauma       COMPARISON: None       TECHNIQUE: Helical volumetric scanning was performed through the facial bones,   orbits and mandible. Axial, coronal and sagittal reconstructions were necessary   to optimally demonstrate the face.   One or more dose reduction techniques were   used on this CT: automated exposure control, adjustment of the mAs and/or kVp   according to patient size, and iterative reconstruction techniques. The   specific techniques used on this CT exam have been documented in the patient's   electronic medical record. Digital Imaging and Communications in Medicine   (DICOM) format image data are available to nonaffiliated external healthcare   facilities or entities on a secure, media free, reciprocally searchable basis   with patient authorization for at least a 12-month period after this study. _______________       FINDINGS:        No fracture. Right maxilla sinus is somewhat small, and nearly completely opacified. Minimal   mucosal thickening lines multiple bilateral ethmoid air cells. No layering   fluid. No significant nasal septal deviation. Bilateral aphakia is noted. _______________           01/18/22 1108  CT HEAD WO CONT Final result    Impression:      Large, acute left subdural hematoma with mass effect, without evidence of   herniation. A telephone report was called to Dr. Feliz Baugh 11:20 PM on 1/18/2022. Results were   understood. Narrative:  EXAM: CT head       INDICATION: Status post fall       COMPARISON: 9/2/2021       TECHNIQUE: Axial scanning was performed through the head without intravenous   contrast.  Sagittal and coronal reconstructions were created from the axial   data. One or more dose reduction techniques were used on this CT: automated   exposure control, adjustment of the mAs and/or kVp according to patient size,   and iterative reconstruction techniques. The specific techniques used on this   CT exam have been documented in the patient's electronic medical record.     Digital Imaging and Communications in Medicine (DICOM) format image data are   available to nonaffiliated external healthcare facilities or entities on a   secure, media free, reciprocally searchable basis with patient authorization for   at least a 12-month period after this study. _______________       FINDINGS:       BRAIN AND POSTERIOR FOSSA: Left-sided subdural hematoma (critical result) is   present lateral to the frontal lobe, temporal lobe and anterior aspect of the   parietal lobe, measuring up to 1.4 cm in thickness. The hematoma is   predominantly hyperdense to gray matter. Effacement of the underlying sulci is   present along with mass effect on the left lateral ventricle. 6 mm of midline   shift to the right noted. No signs of herniation. Gray-white differentiation is   preserved. EXTRA-AXIAL SPACES AND MENINGES: As above. CALVARIUM: Intact. SINUSES: Visualized portions are clear. OTHER: Intracranial calcific atherosclerosis is present.       _______________           01/18/22 1108  CT SPINE CERV WO CONT Final result    Impression:  1. No evidence of acute traumatic injury to the cervical spine. 2. Mild convex left curvature, trace degenerative anterolisthesis at C4-5 and   C7-T1, multilevel degenerative disc disease and facet arthropathy with neural   foraminal stenoses detailed above. Narrative:  EXAM:  CT Cervical Spine W/O Contrast       INDICATION: Status post fall       COMPARISON: None       TECHNIQUE: Helical volumetric scanning was performed from just above the skull   base to the superior thoracic spine. Axial, coronal and sagittal   reconstructions were necessary to optimally demonstrate the cervical spine. One   or more dose reduction techniques were used on this CT: automated exposure   control, adjustment of the mAs and/or kVp according to patient size, and   iterative reconstruction techniques. The specific techniques used on this CT   exam have been documented in the patient's electronic medical record.   Digital   Imaging and Communications in Medicine (DICOM) format image data are available   to nonaffiliated external healthcare facilities or entities on a secure, media   free, reciprocally searchable basis with patient authorization for at least a   12-month period after this study. _______________       FINDINGS:    Vertebral bodies are normal in stature. Trace anterolisthesis is present at C4-5   and and C7-T1. Mild convex left curvature is present. No fracture or suspicious osseous lesion. Multilevel degenerative disc disease and facet arthropathy is present. No   high-grade spinal canal stenosis. A small central protrusion is present at C3-4. Mild bilateral C4-5, moderate bilateral C5-6 and severe bilateral C6-7 neural   foraminal stenoses are present. No precervical soft tissue swelling. Visualized lung apices are clear.       _______________               CXR Results  (Last 48 hours)               01/18/22 1133  XR CHEST PORT Final result    Impression:      Stable examination without active cardiopulmonary disease. Narrative:  EXAM: XR CHEST PORT       CLINICAL INDICATION/HISTORY: cough     > Additional: None. COMPARISON: September 2, 2021       TECHNIQUE: Portable chest       _______________       FINDINGS:       SUPPORT DEVICES: None. HEART AND MEDIASTINUM: Atheromatous calcifications noted in the aortic arch. Normal size and contour. Normal pulmonary vasculature. LUNGS AND PLEURAL SPACES: The lungs are underexpanded. Coarse opacities are   noted with mild volume loss and atelectasis which appear chronic. No focal   consolidation, effusion, or pneumothorax. BONY THORAX AND SOFT TISSUES: No acute osseous abnormality. _______________                 Medications given in the ED-  Medications   niCARdipine in Saline (CARDENE) 25 MG/250 mL infusion kit (has no administration in time range)         Medical Decision Making   I am the first provider for this patient.     I reviewed the vital signs, available nursing notes, past medical history, past surgical history, family history and social history. Vital Signs-Reviewed the patient's vital signs. Pulse Oximetry Analysis - 98% on room air, not hypoxic     Cardiac Monitor:  Rate: 84 bpm  Rhythm: Normal sinus    EKG interpretation: (Preliminary)  EKG read by Dr. Margretta Fleischer at 10:30 AM  Normal sinus rhythm at rate of 72 bpm, SD interval 156 ms, QRS duration of 82 ms, similar compared to prior from September 2021    Records Reviewed: Nursing Notes, Old Medical Records and Previous electrocardiograms    Provider Notes (Medical Decision Making): Luis Arce is a 80 y.o. female presenting after a fall with a head strike. Patient is neurologically intact but CT does reveal large acute subdural.  Patient is hypertensive requiring nicardipine drip for blood pressure management. Called to local facilities with trauma and neurosurgery capabilities and patient was accepted to [facility] for emergent transfer as a bravo trauma. Emergent transport arranged. Patient and  understand and agree with this plan. Procedures:  Procedures    ED Course:   11:14 AM  Updated patient and  on results and plan for transfer to facility with neurosurgery capabilities. All questions answered. 11:46 AM  Transfer center had stated that [facility] neurosurgeon would be calling back. Instead received call back that [facility] is on diversion and will not accept patient. We will try [facility].    CONSULT NOTE:   11:53 AM  Dr. Margretta Fleischer spoke with Dr. Daniela Echeverria   Specialty: Trauma Surgery  Discussed pt's hx, disposition, and available diagnostic and imaging results over the telephone. Reviewed care plans. Send to ED as bravo trauma. Diagnosis and Disposition     Critical Care: 11:57 AM  I have spent 36 minutes of critical care time involved in lab review, consultations with specialist, family decision-making, and documentation.   During this entire length of time I was immediately available to the patient. Critical Care: The reason for providing this level of medical care for this critically ill patient was due a critical illness that impaired one or more vital organ systems such that there was a high probability of imminent or life threatening deterioration in the patients condition. This care involved high complexity decision making to assess, manipulate, and support vital system functions, to treat this degreee vital organ system failure and to prevent further life threatening deterioration of the patients condition. CLINICAL IMPRESSION:    1. Subdural hematoma ([address]    2. Fall, initial encounter    3. Contusion of face, initial encounter        PLAN:  1. Transfer to [facility] ED for trauma and neurosurgery management.  _______________________________      Please note that this dictation was completed with [employer], the computer voice recognition software. Quite often unanticipated grammatical, syntax, homophones, and other interpretive errors are inadvertently transcribed by the computer software. Please disregard these errors. Please excuse any errors that have escaped final proofreading.

## 2024-08-14 NOTE — PERIOP NOTES
Attempted to contact pt, no answer, LVM. [employer] message sent.   Spoke with Poly Stoll, informed him of blood sugar in PACU as noted, he was ok with it and no new orders given. Will continue to monitor.
